# Patient Record
Sex: FEMALE | Race: WHITE | NOT HISPANIC OR LATINO | Employment: FULL TIME | URBAN - METROPOLITAN AREA
[De-identification: names, ages, dates, MRNs, and addresses within clinical notes are randomized per-mention and may not be internally consistent; named-entity substitution may affect disease eponyms.]

---

## 2017-05-23 ENCOUNTER — OFFICE VISIT (OUTPATIENT)
Dept: INTERNAL MEDICINE | Facility: CLINIC | Age: 47
End: 2017-05-23
Payer: COMMERCIAL

## 2017-05-23 VITALS
HEART RATE: 69 BPM | OXYGEN SATURATION: 98 % | SYSTOLIC BLOOD PRESSURE: 132 MMHG | BODY MASS INDEX: 27.74 KG/M2 | DIASTOLIC BLOOD PRESSURE: 82 MMHG | TEMPERATURE: 98 F | WEIGHT: 183 LBS | RESPIRATION RATE: 20 BRPM | HEIGHT: 68 IN

## 2017-05-23 DIAGNOSIS — G51.0 BELL'S PALSY: Primary | ICD-10-CM

## 2017-05-23 PROCEDURE — 99213 OFFICE O/P EST LOW 20 MIN: CPT | Mod: 25,S$GLB,, | Performed by: NURSE PRACTITIONER

## 2017-05-23 PROCEDURE — 96372 THER/PROPH/DIAG INJ SC/IM: CPT | Mod: S$GLB,,, | Performed by: NURSE PRACTITIONER

## 2017-05-23 PROCEDURE — 99999 PR PBB SHADOW E&M-EST. PATIENT-LVL III: CPT | Mod: PBBFAC,,, | Performed by: NURSE PRACTITIONER

## 2017-05-23 RX ORDER — METHYLPREDNISOLONE 4 MG/1
TABLET ORAL
Qty: 1 PACKAGE | Refills: 0 | Status: SHIPPED | OUTPATIENT
Start: 2017-05-23 | End: 2017-06-13

## 2017-05-23 RX ORDER — VALACYCLOVIR HYDROCHLORIDE 1 G/1
1000 TABLET, FILM COATED ORAL 2 TIMES DAILY
Qty: 14 TABLET | Refills: 0 | Status: SHIPPED | OUTPATIENT
Start: 2017-05-23 | End: 2019-07-15

## 2017-05-23 RX ORDER — METHYLPREDNISOLONE ACETATE 80 MG/ML
80 INJECTION, SUSPENSION INTRA-ARTICULAR; INTRALESIONAL; INTRAMUSCULAR; SOFT TISSUE
Status: COMPLETED | OUTPATIENT
Start: 2017-05-23 | End: 2017-05-23

## 2017-05-23 RX ADMIN — METHYLPREDNISOLONE ACETATE 80 MG: 80 INJECTION, SUSPENSION INTRA-ARTICULAR; INTRALESIONAL; INTRAMUSCULAR; SOFT TISSUE at 03:05

## 2017-05-23 NOTE — PROGRESS NOTES
Subjective:       Patient ID: Tracie Scott is a 47 y.o. female.    Chief Complaint: Numbness (in L. side of face x 2 days); Aphasia; Drooling; and Eye Problem (vision problems)    Patient is known, to me and presents with   Chief Complaint   Patient presents with    Numbness     in L. side of face x 2 days    Aphasia    Drooling    Eye Problem     vision problems   .  Denies chest pain and shortness of breath.  Patient presents with possible Bell's Palsy for the past couple of days  HPI  Review of Systems   Constitutional: Negative for activity change, appetite change, fatigue, fever and unexpected weight change.   HENT: Negative for congestion, ear discharge, ear pain, hearing loss, postnasal drip and tinnitus.    Eyes: Positive for photophobia, pain and visual disturbance.   Respiratory: Negative for cough, shortness of breath, wheezing and stridor.    Cardiovascular: Negative for chest pain, palpitations and leg swelling.   Gastrointestinal: Negative for abdominal distention.   Genitourinary: Negative for difficulty urinating, dysuria, frequency, hematuria and urgency.   Musculoskeletal: Negative for arthralgias, back pain, gait problem, joint swelling and neck pain.   Neurological: Positive for numbness. Negative for dizziness, seizures, syncope, weakness, light-headedness and headaches.   Hematological: Negative for adenopathy. Does not bruise/bleed easily.   Psychiatric/Behavioral: Negative for behavioral problems, confusion and hallucinations.       Objective:      Physical Exam   Constitutional: She is oriented to person, place, and time. She appears well-developed and well-nourished. No distress.   HENT:   Head: Normocephalic and atraumatic.   Right Ear: External ear normal.   Left Ear: External ear normal.   Mouth/Throat: Oropharynx is clear and moist. No oropharyngeal exudate.   Eyes: Conjunctivae and EOM are normal. Pupils are equal, round, and reactive to light. Right eye exhibits no discharge. Left  "eye exhibits no discharge.   Neck: Normal range of motion. Neck supple. No JVD present. No thyromegaly present.   Cardiovascular: Normal rate, regular rhythm, normal heart sounds and intact distal pulses.  Exam reveals no gallop and no friction rub.    No murmur heard.  Pulmonary/Chest: Effort normal and breath sounds normal. No stridor. No respiratory distress. She has no wheezes. She has no rales. She exhibits no tenderness.   Abdominal: Soft. Bowel sounds are normal. She exhibits no distension and no mass. There is no tenderness. There is no rebound.   Musculoskeletal: Normal range of motion. She exhibits no edema or tenderness.   Lymphadenopathy:     She has no cervical adenopathy.   Neurological: She is alert and oriented to person, place, and time. She has normal reflexes. She displays normal reflexes. A cranial nerve deficit is present. She exhibits normal muscle tone. Coordination normal.   Unable to close left eyelid and has some left facial drooping. Tingling and numbness to left side of face.    Skin: Skin is warm and dry. No rash noted. No erythema. No pallor.   Psychiatric: She has a normal mood and affect. Her behavior is normal. Judgment and thought content normal.       Assessment:       1. Bell's palsy        Plan:   Tracie was seen today for numbness, aphasia, drooling and eye problem.    Diagnoses and all orders for this visit:    Bell's palsy  -     methylPREDNISolone acetate injection 80 mg; Inject 1 mL (80 mg total) into the muscle one time.  -     methylPREDNISolone (MEDROL DOSEPACK) 4 mg tablet; use as directed  -     valacyclovir (VALTREX) 1000 MG tablet; Take 1 tablet (1,000 mg total) by mouth 2 (two) times daily.    "This note will not be shared with the patient."  Gave instructions on bell's palsy  Also on eye care  Will see patient on Friday  RTC as scheduled  "

## 2017-05-26 ENCOUNTER — OFFICE VISIT (OUTPATIENT)
Dept: INTERNAL MEDICINE | Facility: CLINIC | Age: 47
End: 2017-05-26
Payer: COMMERCIAL

## 2017-05-26 VITALS
DIASTOLIC BLOOD PRESSURE: 68 MMHG | WEIGHT: 180 LBS | HEIGHT: 68 IN | BODY MASS INDEX: 27.28 KG/M2 | RESPIRATION RATE: 20 BRPM | HEART RATE: 75 BPM | OXYGEN SATURATION: 99 % | SYSTOLIC BLOOD PRESSURE: 114 MMHG

## 2017-05-26 DIAGNOSIS — G51.0 BELL'S PALSY: Primary | ICD-10-CM

## 2017-05-26 PROCEDURE — 99214 OFFICE O/P EST MOD 30 MIN: CPT | Mod: S$GLB,,, | Performed by: NURSE PRACTITIONER

## 2017-05-26 PROCEDURE — 99999 PR PBB SHADOW E&M-EST. PATIENT-LVL III: CPT | Mod: PBBFAC,,, | Performed by: NURSE PRACTITIONER

## 2017-05-26 NOTE — PROGRESS NOTES
Subjective:       Patient ID: Tracie Scott is a 47 y.o. female.    Chief Complaint: Follow-up (x 3 days)    Patient is known, to me and presents with   Chief Complaint   Patient presents with    Follow-up     x 3 days   .  Denies chest pain and shortness of breath.  Follow up bell's palsy and states that she does feel better. Her left eye still has some sensitivity and able to close it better  HPI  Review of Systems   Constitutional: Negative for activity change, appetite change, fatigue, fever and unexpected weight change.   HENT: Negative for congestion, ear discharge, ear pain, hearing loss, postnasal drip and tinnitus.    Eyes: Positive for photophobia and pain. Negative for visual disturbance.   Respiratory: Negative for cough, shortness of breath, wheezing and stridor.    Cardiovascular: Negative for chest pain, palpitations and leg swelling.   Gastrointestinal: Negative for abdominal distention.   Genitourinary: Negative for difficulty urinating, dysuria, frequency, hematuria and urgency.   Musculoskeletal: Negative for arthralgias, back pain, gait problem, joint swelling and neck pain.   Neurological: Positive for facial asymmetry. Negative for dizziness, seizures, syncope, weakness, light-headedness, numbness and headaches.   Hematological: Negative for adenopathy. Does not bruise/bleed easily.   Psychiatric/Behavioral: Negative for behavioral problems, confusion and hallucinations.       Objective:      Physical Exam   Constitutional: She is oriented to person, place, and time. She appears well-developed and well-nourished. No distress.   HENT:   Head: Normocephalic and atraumatic.   Right Ear: External ear normal.   Left Ear: External ear normal.   Mouth/Throat: Oropharynx is clear and moist. No oropharyngeal exudate.   Eyes: Conjunctivae and EOM are normal. Pupils are equal, round, and reactive to light. Right eye exhibits no discharge. Left eye exhibits no discharge.   Neck: Normal range of motion.  "Neck supple. No JVD present. No thyromegaly present.   Cardiovascular: Normal rate, regular rhythm, normal heart sounds and intact distal pulses.  Exam reveals no gallop and no friction rub.    No murmur heard.  Pulmonary/Chest: Effort normal and breath sounds normal. No stridor. No respiratory distress. She has no wheezes. She has no rales. She exhibits no tenderness.   Abdominal: Soft. Bowel sounds are normal. She exhibits no distension and no mass. There is no tenderness. There is no rebound.   Musculoskeletal: Normal range of motion. She exhibits no edema or tenderness.   Lymphadenopathy:     She has no cervical adenopathy.   Neurological: She is alert and oriented to person, place, and time. She has normal reflexes. She displays normal reflexes. A cranial nerve deficit is present. She exhibits normal muscle tone. Coordination normal.   Improved left sided drooping and able to close left eye lid   Skin: Skin is warm and dry. No rash noted. No erythema. No pallor.   Psychiatric: She has a normal mood and affect. Her behavior is normal. Judgment and thought content normal.       Assessment:       1. Bell's palsy        Plan:   Tracie was seen today for follow-up.    Diagnoses and all orders for this visit:    Bell's palsy    "This note will not be shared with the patient."  Improving and feeling better  Continue with medication  rtc in two weeks   "

## 2017-09-12 ENCOUNTER — LAB VISIT (OUTPATIENT)
Dept: LAB | Facility: HOSPITAL | Age: 47
End: 2017-09-12
Attending: OBSTETRICS & GYNECOLOGY
Payer: COMMERCIAL

## 2017-09-12 ENCOUNTER — OFFICE VISIT (OUTPATIENT)
Dept: OBSTETRICS AND GYNECOLOGY | Facility: CLINIC | Age: 47
End: 2017-09-12
Payer: COMMERCIAL

## 2017-09-12 VITALS
RESPIRATION RATE: 13 BRPM | BODY MASS INDEX: 29.66 KG/M2 | HEIGHT: 67 IN | DIASTOLIC BLOOD PRESSURE: 76 MMHG | HEART RATE: 72 BPM | SYSTOLIC BLOOD PRESSURE: 122 MMHG | WEIGHT: 189 LBS

## 2017-09-12 DIAGNOSIS — N30.01 ACUTE CYSTITIS WITH HEMATURIA: ICD-10-CM

## 2017-09-12 DIAGNOSIS — R30.0 DYSURIA: Primary | ICD-10-CM

## 2017-09-12 DIAGNOSIS — N92.6 IRREGULAR MENSTRUAL CYCLE: ICD-10-CM

## 2017-09-12 LAB
BILIRUB SERPL-MCNC: NEGATIVE MG/DL
BLOOD URINE, POC: NEGATIVE
COLOR, POC UA: CLEAR
FSH SERPL-ACNC: 22 MIU/ML
GLUCOSE UR QL STRIP: NEGATIVE
KETONES UR QL STRIP: NEGATIVE
LEUKOCYTE ESTERASE URINE, POC: 2
NITRITE, POC UA: NEGATIVE
PH, POC UA: 5
PROTEIN, POC: NEGATIVE
SPECIFIC GRAVITY, POC UA: 1.01
UROBILINOGEN, POC UA: NEGATIVE

## 2017-09-12 PROCEDURE — 83001 ASSAY OF GONADOTROPIN (FSH): CPT

## 2017-09-12 PROCEDURE — 81001 URINALYSIS AUTO W/SCOPE: CPT | Mod: S$GLB,,, | Performed by: OBSTETRICS & GYNECOLOGY

## 2017-09-12 PROCEDURE — 3008F BODY MASS INDEX DOCD: CPT | Mod: S$GLB,,, | Performed by: OBSTETRICS & GYNECOLOGY

## 2017-09-12 PROCEDURE — 36415 COLL VENOUS BLD VENIPUNCTURE: CPT

## 2017-09-12 PROCEDURE — 99213 OFFICE O/P EST LOW 20 MIN: CPT | Mod: 25,S$GLB,, | Performed by: OBSTETRICS & GYNECOLOGY

## 2017-09-12 PROCEDURE — 99999 PR PBB SHADOW E&M-EST. PATIENT-LVL III: CPT | Mod: PBBFAC,,, | Performed by: OBSTETRICS & GYNECOLOGY

## 2017-09-12 RX ORDER — MEDROXYPROGESTERONE ACETATE 10 MG/1
10 TABLET ORAL DAILY
Qty: 10 TABLET | Refills: 3 | Status: SHIPPED | OUTPATIENT
Start: 2017-09-12 | End: 2019-07-15

## 2017-09-12 RX ORDER — NITROFURANTOIN 25; 75 MG/1; MG/1
100 CAPSULE ORAL 2 TIMES DAILY
Qty: 14 CAPSULE | Refills: 0 | Status: SHIPPED | OUTPATIENT
Start: 2017-09-12 | End: 2017-10-09

## 2017-09-12 NOTE — PROGRESS NOTES
Subjective:       Patient ID: Tracie Scott is a 47 y.o. female.    Chief Complaint:  Pelvic Pain (right sided, approx for 1 month, on & off); Urinary Frequency; and Urinary Pressure (no burning)      History of Present Illness  Patient presents complaining of some pelvic pain.  Patient describes it as feeling as if she has to have a menstrual cycle.  Patient is status post radiation and chemotherapy for breast cancer approximately 2 years ago.  Patient states she only had one episode of bleeding and an entire 2 year time frame.  Patient states is been many months since she's had a period but she feels like she needs to.  She also complains of frequency and urgency with suprapubic pressure.  Patient denies any fever or chills.    Menstrual History:  OB History      Para Term  AB Living    3 3       4    SAB TAB Ectopic Multiple Live Births          1           Menarche age:   Patient's last menstrual period was 2017.         Review of Systems  Review of Systems   Constitutional: Positive for chills, fatigue and unexpected weight change. Negative for activity change, appetite change, diaphoresis and fever.   HENT: Positive for dental problem, postnasal drip and sinus pressure. Negative for congestion, drooling, ear discharge, ear pain, facial swelling, hearing loss, mouth sores, nosebleeds, rhinorrhea, sneezing, sore throat, tinnitus, trouble swallowing and voice change.    Eyes: Negative for photophobia, pain, discharge, redness, itching and visual disturbance.   Respiratory: Negative for apnea, cough, choking, chest tightness, shortness of breath, wheezing and stridor.    Cardiovascular: Negative for chest pain, palpitations and leg swelling.   Gastrointestinal: Negative for abdominal distention, abdominal pain, anal bleeding, blood in stool, constipation, diarrhea, nausea, rectal pain and vomiting.   Endocrine: Positive for polydipsia and polyuria. Negative for cold intolerance, heat intolerance  and polyphagia.   Genitourinary: Positive for urgency. Negative for decreased urine volume, difficulty urinating, dyspareunia, dysuria, enuresis, flank pain, frequency, genital sores, hematuria, menstrual problem, pelvic pain, vaginal bleeding, vaginal discharge and vaginal pain.   Musculoskeletal: Positive for arthralgias. Negative for back pain, gait problem, joint swelling, myalgias, neck pain and neck stiffness.   Skin: Negative for color change, pallor, rash and wound.   Allergic/Immunologic: Negative for environmental allergies, food allergies and immunocompromised state.   Neurological: Positive for headaches. Negative for dizziness, tremors, seizures, syncope, facial asymmetry, speech difficulty, weakness, light-headedness and numbness.   Hematological: Negative for adenopathy. Does not bruise/bleed easily.   Psychiatric/Behavioral: Positive for sleep disturbance. Negative for agitation, behavioral problems, confusion, decreased concentration, dysphoric mood, hallucinations, self-injury and suicidal ideas. The patient is nervous/anxious. The patient is not hyperactive.            Objective:    Physical Exam   Genitourinary: Rectal exam shows no external hemorrhoid. No labial fusion. There is no rash, tenderness, lesion or injury on the right labia. There is no rash, tenderness, lesion or injury on the left labia. Uterus is not deviated, not enlarged, not fixed and not tender. Cervix exhibits no motion tenderness, no discharge and no friability. Right adnexum displays no mass, no tenderness and no fullness. Left adnexum displays tenderness. Left adnexum displays no mass and no fullness. No erythema, tenderness or bleeding in the vagina. No foreign body in the vagina. No signs of injury around the vagina. No vaginal discharge found.   Nursing note and vitals reviewed.        Assessment:        1. Dysuria    2. Acute cystitis with hematuria    3. Irregular menstrual cycle               Plan:        Tracie was  seen today for pelvic pain, urinary frequency and urinary pressure.    Diagnoses and all orders for this visit:    Dysuria  -     POCT URINE SEDIMENT EXAM  -     POCT urinalysis, dipstick or tablet reag    Acute cystitis with hematuria    Irregular menstrual cycle

## 2017-10-09 ENCOUNTER — PROCEDURE VISIT (OUTPATIENT)
Dept: OBSTETRICS AND GYNECOLOGY | Facility: CLINIC | Age: 47
End: 2017-10-09
Payer: COMMERCIAL

## 2017-10-09 ENCOUNTER — OFFICE VISIT (OUTPATIENT)
Dept: OBSTETRICS AND GYNECOLOGY | Facility: CLINIC | Age: 47
End: 2017-10-09
Payer: COMMERCIAL

## 2017-10-09 ENCOUNTER — TELEPHONE (OUTPATIENT)
Dept: OBSTETRICS AND GYNECOLOGY | Facility: CLINIC | Age: 47
End: 2017-10-09

## 2017-10-09 VITALS
DIASTOLIC BLOOD PRESSURE: 80 MMHG | BODY MASS INDEX: 29.98 KG/M2 | SYSTOLIC BLOOD PRESSURE: 122 MMHG | HEART RATE: 76 BPM | WEIGHT: 191 LBS | HEIGHT: 67 IN | RESPIRATION RATE: 13 BRPM

## 2017-10-09 DIAGNOSIS — R30.0 DYSURIA: ICD-10-CM

## 2017-10-09 DIAGNOSIS — R10.2 PELVIC PAIN IN FEMALE: Primary | ICD-10-CM

## 2017-10-09 DIAGNOSIS — R10.2 PELVIC PAIN IN FEMALE: ICD-10-CM

## 2017-10-09 PROCEDURE — 99999 PR PBB SHADOW E&M-EST. PATIENT-LVL III: CPT | Mod: PBBFAC,,, | Performed by: OBSTETRICS & GYNECOLOGY

## 2017-10-09 PROCEDURE — 99214 OFFICE O/P EST MOD 30 MIN: CPT | Mod: S$GLB,,, | Performed by: OBSTETRICS & GYNECOLOGY

## 2017-10-09 PROCEDURE — 87086 URINE CULTURE/COLONY COUNT: CPT

## 2017-10-09 PROCEDURE — 76830 TRANSVAGINAL US NON-OB: CPT | Mod: S$GLB,,, | Performed by: OBSTETRICS & GYNECOLOGY

## 2017-10-09 NOTE — TELEPHONE ENCOUNTER
----- Message from Danielle Donovan MA sent at 10/9/2017  8:43 AM CDT -----  Contact: self  Tracie Scott  MRN: [id number]  Home Phone      [phone]  Work Phone      Not on file.  Mobile          [phone]    Patient Care Team:  Primary Doctor No as PCP - General  Ilan Becker MD as Obstetrician (Obstetrics)  OB? No  What phone number can you be reached at?   [phone]  Message:   Needs to discuss abd pain.  Stated still having pain since last office visit and not sure why or if this or normal.

## 2017-10-09 NOTE — PROGRESS NOTES
Subjective:    Patient ID: Tracie Scott is a 47 y.o. y.o. female.     Chief Complaint:   Chief Complaint   Patient presents with    Pelvic Pain     radiates    PELVIC PRESSURE       History of Present Illness:  Tracie presents today for evaluation of pelvic pain which has been going on for the past 2 months. She states she has pain in her lower abdomen which radiates across her abdomen into her pelvis. She has urinary frequency and occasional dysuria. She denies hematuria, fever, chills, N/V, diarrhea, but admits to constipation.  She has been on antibiotic for a bladder infection but states she has not improved.    Menstrual History:   Patient's last menstrual period was 2017 (exact date)..     OB History      Para Term  AB Living    3 3       4    SAB TAB Ectopic Multiple Live Births          1              Review of Systems   Constitutional: Negative for activity change, appetite change, chills, diaphoresis, fatigue, fever and unexpected weight change.   HENT: Negative for mouth sores and tinnitus.    Eyes: Negative for discharge and visual disturbance.   Respiratory: Negative for cough, shortness of breath and wheezing.    Cardiovascular: Negative for chest pain, palpitations and leg swelling.   Gastrointestinal: Positive for abdominal pain and constipation. Negative for blood in stool, diarrhea, nausea and vomiting.   Endocrine: Negative for diabetes, hair loss, hot flashes, hyperthyroidism and hypothyroidism.   Genitourinary: Positive for dysuria, frequency and pelvic pain. Negative for decreased libido, dyspareunia, flank pain, genital sores, hematuria, menorrhagia, menstrual problem, urgency, vaginal bleeding, vaginal discharge, vaginal pain, urinary incontinence, postcoital bleeding and vaginal odor.   Musculoskeletal: Negative for back pain, joint swelling and myalgias.   Skin:  Negative for rash, no acne and hair changes.   Neurological: Negative for seizures, syncope, numbness and  headaches.   Hematological: Negative for adenopathy. Does not bruise/bleed easily.   Psychiatric/Behavioral: Negative for sleep disturbance. The patient is not nervous/anxious.    Breast: Negative for breast pain and nipple discharge        Objective:    Vital Signs:  Vitals:    10/09/17 1345   BP: 122/80   Pulse: 76   Resp: 13       Physical Exam:  General:  alert,normal appearing gravid female   Skin:  Skin color, texture, turgor normal. No rashes or lesions   HEENT:  conjunctivae/corneas clear. PERRL.   Neck: supple, trachea midline, no adenopathy or thyromegally   Respiratory:  clear to auscultation bilaterally   Heart:  regular rate and rhythm, S1, S2 normal, no murmur, click, rub or gallop   Breasts:   Nipples are protruding and have no nipple discharge. No palpable masses, erythema, skin changes, tenderness, or adenopathy.   Abdomen:  soft, miold tenderness all quadrants. No guarding, rebound. Bowel sounds normal. No masses,  no organomegaly   Pelvis: External genitalia: normal general appearance  Urinary system: urethral meatus normal, bladder tender to palpation  Vaginal: normal mucosa without prolapse or lesions  Cervix: normal appearance. No cervical motion tenderness.  Uterus: anteverted, tender  Adnexa: tenderness, no palpable masses.   Extremities: Normal ROM; no edema, no cyanosis   Neurologial: Normal strength and tone. No focal numbness or weakness. Reflexes 2+ and equal.   Psychiatric: normal mood, speech, dress, and thought processes         Assessment:      1. Pelvic pain in female    2. Dysuria          Plan:      Pelvic pain in female  -     US OB/GYN Procedure ([facility]); Future; Expected date: 10/09/2017    Dysuria  -     Urine culture; Future; Expected date: 10/09/2017        NOTE:  Following U/S it was noted that the endometrium was thickened and irregular in appearance. I would recommend biopsy as she is currently on Tamoxifen. She will have this following her urology consult.

## 2017-10-10 ENCOUNTER — TELEPHONE (OUTPATIENT)
Dept: OBSTETRICS AND GYNECOLOGY | Facility: CLINIC | Age: 47
End: 2017-10-10

## 2017-10-10 NOTE — TELEPHONE ENCOUNTER
Patient notified of appointment with [facility] Angu[facility] 10/11/17 at 1:40PM. Patient verbalized understanding with no questions or concerns.

## 2017-10-11 LAB — BACTERIA UR CULT: NO GROWTH

## 2017-10-16 ENCOUNTER — OFFICE VISIT (OUTPATIENT)
Dept: OBSTETRICS AND GYNECOLOGY | Facility: CLINIC | Age: 47
End: 2017-10-16
Payer: COMMERCIAL

## 2017-10-16 VITALS
WEIGHT: 190.63 LBS | DIASTOLIC BLOOD PRESSURE: 66 MMHG | SYSTOLIC BLOOD PRESSURE: 112 MMHG | HEIGHT: 67 IN | RESPIRATION RATE: 16 BRPM | HEART RATE: 68 BPM | BODY MASS INDEX: 29.92 KG/M2

## 2017-10-16 DIAGNOSIS — N85.00 ENDOMETRIAL HYPERPLASIA: Primary | ICD-10-CM

## 2017-10-16 PROCEDURE — 99214 OFFICE O/P EST MOD 30 MIN: CPT | Mod: 25,S$GLB,, | Performed by: OBSTETRICS & GYNECOLOGY

## 2017-10-16 PROCEDURE — 88305 TISSUE EXAM BY PATHOLOGIST: CPT | Performed by: PATHOLOGY

## 2017-10-16 PROCEDURE — 99999 PR PBB SHADOW E&M-EST. PATIENT-LVL III: CPT | Mod: PBBFAC,,, | Performed by: OBSTETRICS & GYNECOLOGY

## 2017-10-16 PROCEDURE — 58100 BIOPSY OF UTERUS LINING: CPT | Mod: S$GLB,,, | Performed by: OBSTETRICS & GYNECOLOGY

## 2017-10-16 NOTE — PROGRESS NOTES
Subjective:    Patient ID: Tracie Scott is a 47 y.o. y.o. female    Chief Complaint:   Chief Complaint   Patient presents with    Pelvic Pain       History of Present Illness:  Tracie presents today for follow-up of pelvic pain. She states she is feeling much better folowing treatment. She had an ultrasound last week which revealed a thickened endometrium with cystic abnormality. She has been on Tamoxifen for the past 18 months. She denies vaginal bleeding. Since having chemotherapy for breast cancer.      Review of Systems   Constitutional: Negative for activity change, appetite change, chills, diaphoresis, fatigue, fever and unexpected weight change.   Respiratory: Negative for cough, shortness of breath and wheezing.    Cardiovascular: Negative for chest pain, palpitations and leg swelling.   Gastrointestinal: Negative for abdominal pain, blood in stool, constipation, diarrhea, nausea and vomiting.   Endocrine: Negative for diabetes, hair loss, hot flashes, hyperthyroidism and hypothyroidism.   Genitourinary: Negative for decreased libido, dyspareunia, dysuria, flank pain, frequency, genital sores, hematuria, menorrhagia, menstrual problem, pelvic pain, urgency, vaginal bleeding, vaginal discharge, vaginal pain, urinary incontinence, postcoital bleeding and vaginal odor.   Hematological: Negative for adenopathy. Does not bruise/bleed easily.   Psychiatric/Behavioral: Negative for sleep disturbance. The patient is not nervous/anxious.    Breast: Negative for breast pain and nipple discharge          Objective:    Vital Signs:  Vitals:    10/16/17 1404   BP: 112/66   Pulse: 68   Resp: 16       Physical Exam:  General:  alert,normal appearing gravid female   Skin:  Skin color, texture, turgor normal. No rashes or lesions   Abdomen: soft, non-tender. Bowel sounds normal. No masses,  no organomegaly   Pelvis: External genitalia: normal general appearance  Urinary system: urethral meatus normal, bladder  nontender  Vaginal: normal mucosa without prolapse or lesions  Cervix: normal appearance  Uterus: normal single, nontender  Adnexa: normal bimanual exam         Assessment:      1. Endometrial hyperplasia          Plan:      Endometrial hyperplasia      Endometrial Biopsy:      The risks of uterine hyperplasia, neoplasia, and cancer were explained to the patient, as well as the likelihood of abnormal or anovulatory bleeding. She does not have a medical condition, history of anticoagulation, or other evident reason for bleeding at this time.  The rationale for evaluation with endometrial biopsy and pelvic ultrasound was discussed, and she verbalized understanding. She was informed of the risk of bleeding, infection, uterine perforation and pain and that the test will rule out endometrial cancer with accuracy greater than 95%.  She was counseled on the alternatives to endometrial biopsy and agrees to proceed.    TIMEOUT PERFORMED.  The cervix was visualized with a speculum.  The cervix was prepped with Hibiclens.    The cervix was not stenotic. A single-tooth tenaculum was used to stabilize the cervix. Cervical dilation was not required. A Pipelle was used to obtain the endometrial biopsy. The uterus was sounded to 8 cm. Endocervical curettage was not performed. Multiple passes were taken with the Pipelle with retrieval of Adequate tissue sample.    The specimen was placed in formalin and sent to pathology for histology evaluation.      She tolerated the procedure well

## 2017-10-16 NOTE — PROCEDURES
Endometrial biopsy  Date/Time: 10/16/2017 2:37 PM  Performed by: EDMUNDO GLASGOW.  Authorized by: EDMUNDO GLASGOW.         Endometrial Biopsy:       The risks of uterine hyperplasia, neoplasia, and cancer were explained to the patient, as well as the likelihood of abnormal or anovulatory bleeding. She does not have a medical condition, history of anticoagulation, or other evident reason for bleeding at this time.  The rationale for evaluation with endometrial biopsy and pelvic ultrasound was discussed, and she verbalized understanding. She was informed of the risk of bleeding, infection, uterine perforation and pain and that the test will rule out endometrial cancer with accuracy greater than 95%.  She was counseled on the alternatives to endometrial biopsy and agrees to proceed.     TIMEOUT PERFORMED.  The cervix was visualized with a speculum.  The cervix was prepped with Hibiclens.     The cervix was not stenotic. A single-tooth tenaculum was used to stabilize the cervix. Cervical dilation was not required. A Pipelle was used to obtain the endometrial biopsy. The uterus was sounded to 8 cm. Endocervical curettage was not performed. Multiple passes were taken with the Pipelle with retrieval of Adequate tissue sample.     The specimen was placed in formalin and sent to pathology for histology evaluation.       She tolerated the procedure well

## 2017-10-27 ENCOUNTER — TELEPHONE (OUTPATIENT)
Dept: OBSTETRICS AND GYNECOLOGY | Facility: CLINIC | Age: 47
End: 2017-10-27

## 2017-10-27 NOTE — TELEPHONE ENCOUNTER
----- Message from Cindi Davis sent at 10/27/2017 12:58 PM CDT -----  Contact: self  Tracie Scott  MRN: [id number]  Home Phone      [phone]  Work Phone      Not on file.  Mobile          [phone]    Patient Care Team:  Primary Doctor No as PCP - General  Ilan Becker MD as Obstetrician (Obstetrics)  OB? No  What phone number can you be reached at? [phone]  Message: calling for results

## 2017-10-30 NOTE — TELEPHONE ENCOUNTER
I spoke with Tracie regarding her EMB and Right OVarian Cyst. I would like to repeat her U/S in 4-6 weeks. If cyst still present we can discuss removal. In view of her being on   Tamoxifen and the thickened endometrium suggesting a polyp, I would recommend D&C. Will decide after next U/S.

## 2018-01-16 ENCOUNTER — TELEPHONE (OUTPATIENT)
Dept: OBSTETRICS AND GYNECOLOGY | Facility: CLINIC | Age: 48
End: 2018-01-16

## 2018-01-16 NOTE — TELEPHONE ENCOUNTER
----- Message from Sarita Valadez sent at 1/16/2018 12:07 PM CST -----  Contact: Self  Tracie Scott  MRN: [id number]  Home Phone      [phone]  Work Phone      Not on file.  Mobile          [phone]    Patient Care Team:  Primary Doctor No as PCP - General  Ilan Becker MD as Obstetrician (Obstetrics)  OB? No  What phone number can you be reached at? [phone]  Message:   Patient states her cyst is still bothering her and she was instructed to call the clinic with any problems. Please return call.

## 2018-01-16 NOTE — TELEPHONE ENCOUNTER
Patient states GI physician called her this morning to notify that she has cyst on her kidneys. Patient requesting evaluation. Patient scheduled with Dr. Peraza tomorrow at 8:30AM. Patient verbalized understanding with no questions or concerns.

## 2019-03-18 ENCOUNTER — APPOINTMENT (OUTPATIENT)
Dept: LAB | Facility: HOSPITAL | Age: 49
End: 2019-03-18
Attending: OTOLARYNGOLOGY
Payer: COMMERCIAL

## 2019-03-18 DIAGNOSIS — J11.1 FLU: Primary | ICD-10-CM

## 2019-03-18 LAB
INFLUENZA A, MOLECULAR: POSITIVE
INFLUENZA B, MOLECULAR: NEGATIVE
SPECIMEN SOURCE: ABNORMAL

## 2019-03-18 PROCEDURE — 87502 INFLUENZA DNA AMP PROBE: CPT

## 2019-03-29 ENCOUNTER — OFFICE VISIT (OUTPATIENT)
Dept: INTERNAL MEDICINE | Facility: CLINIC | Age: 49
End: 2019-03-29
Payer: COMMERCIAL

## 2019-03-29 VITALS
SYSTOLIC BLOOD PRESSURE: 118 MMHG | RESPIRATION RATE: 10 BRPM | WEIGHT: 183 LBS | HEIGHT: 67 IN | BODY MASS INDEX: 28.72 KG/M2 | DIASTOLIC BLOOD PRESSURE: 60 MMHG | HEART RATE: 78 BPM

## 2019-03-29 DIAGNOSIS — J06.9 VIRAL UPPER RESPIRATORY INFECTION: Primary | ICD-10-CM

## 2019-03-29 LAB
CTP QC/QA: YES
POC MOLECULAR INFLUENZA A AGN: NEGATIVE
POC MOLECULAR INFLUENZA B AGN: NEGATIVE

## 2019-03-29 PROCEDURE — 3008F PR BODY MASS INDEX (BMI) DOCUMENTED: ICD-10-PCS | Mod: CPTII,S$GLB,, | Performed by: NURSE PRACTITIONER

## 2019-03-29 PROCEDURE — 99999 PR PBB SHADOW E&M-EST. PATIENT-LVL III: CPT | Mod: PBBFAC,,, | Performed by: NURSE PRACTITIONER

## 2019-03-29 PROCEDURE — 87502 POCT INFLUENZA A/B MOLECULAR: ICD-10-PCS | Mod: QW,S$GLB,, | Performed by: NURSE PRACTITIONER

## 2019-03-29 PROCEDURE — 99999 PR PBB SHADOW E&M-EST. PATIENT-LVL III: ICD-10-PCS | Mod: PBBFAC,,, | Performed by: NURSE PRACTITIONER

## 2019-03-29 PROCEDURE — 3008F BODY MASS INDEX DOCD: CPT | Mod: CPTII,S$GLB,, | Performed by: NURSE PRACTITIONER

## 2019-03-29 PROCEDURE — 99213 PR OFFICE/OUTPT VISIT, EST, LEVL III, 20-29 MIN: ICD-10-PCS | Mod: 25,S$GLB,, | Performed by: NURSE PRACTITIONER

## 2019-03-29 PROCEDURE — 87502 INFLUENZA DNA AMP PROBE: CPT | Mod: QW,S$GLB,, | Performed by: NURSE PRACTITIONER

## 2019-03-29 PROCEDURE — 96372 PR INJECTION,THERAP/PROPH/DIAG2ST, IM OR SUBCUT: ICD-10-PCS | Mod: S$GLB,,, | Performed by: NURSE PRACTITIONER

## 2019-03-29 PROCEDURE — 99213 OFFICE O/P EST LOW 20 MIN: CPT | Mod: 25,S$GLB,, | Performed by: NURSE PRACTITIONER

## 2019-03-29 PROCEDURE — 96372 THER/PROPH/DIAG INJ SC/IM: CPT | Mod: S$GLB,,, | Performed by: NURSE PRACTITIONER

## 2019-03-29 RX ORDER — CLINDAMYCIN HYDROCHLORIDE 300 MG/1
CAPSULE ORAL
COMMUNITY
Start: 2019-03-18 | End: 2019-07-15

## 2019-03-29 RX ORDER — METHYLPREDNISOLONE ACETATE 80 MG/ML
80 INJECTION, SUSPENSION INTRA-ARTICULAR; INTRALESIONAL; INTRAMUSCULAR; SOFT TISSUE ONCE
Status: COMPLETED | OUTPATIENT
Start: 2019-03-29 | End: 2019-03-29

## 2019-03-29 RX ADMIN — METHYLPREDNISOLONE ACETATE 80 MG: 80 INJECTION, SUSPENSION INTRA-ARTICULAR; INTRALESIONAL; INTRAMUSCULAR; SOFT TISSUE at 11:03

## 2019-03-29 NOTE — PROGRESS NOTES
Subjective:       Patient ID: Tracie Scott is a 48 y.o. female.    Chief Complaint: Cough (pt states that she was dx with flu last week ); Otalgia; and Nasal Congestion    Patient is known, to me and presents with   Chief Complaint   Patient presents with    Cough     pt states that she was dx with flu last week     Otalgia    Nasal Congestion   .  Denies chest pain and shortness of breath.  Patient presents with cough and congestion. She states that she saw ENT last week dx flu and was given tamiflu and clindamycin. She reports that she has above s/s now  HPI  Review of Systems   Constitutional: Negative.    HENT: Positive for congestion, postnasal drip and sore throat.    Eyes: Negative.    Respiratory: Positive for cough.    Cardiovascular: Negative.    Skin: Negative.    Hematological: Negative.        Objective:      Physical Exam   Constitutional: She appears well-developed and well-nourished. No distress.   HENT:   Head: Normocephalic and atraumatic.   Right Ear: Tympanic membrane mobility is abnormal.   Left Ear: Tympanic membrane mobility is abnormal.   Nose: Mucosal edema present.   Mouth/Throat: No oropharyngeal exudate or posterior oropharyngeal erythema.   Eyes: Conjunctivae are normal. Right eye exhibits no discharge. Left eye exhibits no discharge.   Neck: Normal range of motion. Neck supple. No JVD present. No tracheal deviation present. No thyromegaly present.   Cardiovascular: Normal rate, regular rhythm and normal heart sounds.   No murmur heard.  Pulmonary/Chest: Effort normal and breath sounds normal. She has no wheezes.   Lymphadenopathy:     She has no cervical adenopathy.   Skin: Skin is warm and dry. Capillary refill takes less than 2 seconds. No rash noted. She is not diaphoretic. No erythema. No pallor.       Assessment:       1. Viral upper respiratory infection        Plan:   Tracie was seen today for cough, otalgia and nasal congestion.    Diagnoses and all orders for this  "visit:    Viral upper respiratory infection  -     methylPREDNISolone acetate injection 80 mg  -     POCT Influenza A/B Molecular    "This note will not be shared with the patient."  Flu negative  Use flonase and claritin  rtc as scheduled  "

## 2019-05-01 ENCOUNTER — TELEPHONE (OUTPATIENT)
Dept: INTERNAL MEDICINE | Facility: CLINIC | Age: 49
End: 2019-05-01

## 2019-05-01 DIAGNOSIS — Z12.11 COLON CANCER SCREENING: Primary | ICD-10-CM

## 2019-05-01 NOTE — TELEPHONE ENCOUNTER
----- Message from Maggie Ugarte MA sent at 5/1/2019  2:05 PM CDT -----  Contact: Patient  Patient is requesting a kit to do stool test through the mail for colon cancer.     Please Advise--[phone]

## 2019-05-17 ENCOUNTER — TELEPHONE (OUTPATIENT)
Dept: INTERNAL MEDICINE | Facility: CLINIC | Age: 49
End: 2019-05-17

## 2019-07-09 ENCOUNTER — LAB VISIT (OUTPATIENT)
Dept: LAB | Facility: HOSPITAL | Age: 49
End: 2019-07-09
Attending: NURSE PRACTITIONER
Payer: COMMERCIAL

## 2019-07-09 DIAGNOSIS — Z12.11 COLON CANCER SCREENING: ICD-10-CM

## 2019-07-15 ENCOUNTER — OFFICE VISIT (OUTPATIENT)
Dept: NEUROLOGY | Facility: CLINIC | Age: 49
End: 2019-07-15
Payer: COMMERCIAL

## 2019-07-15 VITALS
SYSTOLIC BLOOD PRESSURE: 126 MMHG | BODY MASS INDEX: 30.1 KG/M2 | HEART RATE: 76 BPM | DIASTOLIC BLOOD PRESSURE: 64 MMHG | HEIGHT: 67 IN | RESPIRATION RATE: 16 BRPM | WEIGHT: 191.81 LBS

## 2019-07-15 DIAGNOSIS — R20.0 RIGHT ARM NUMBNESS: Primary | ICD-10-CM

## 2019-07-15 LAB — HEMOCCULT STL QL IA: NEGATIVE

## 2019-07-15 PROCEDURE — 99214 OFFICE O/P EST MOD 30 MIN: CPT | Mod: S$GLB,,, | Performed by: PSYCHIATRY & NEUROLOGY

## 2019-07-15 PROCEDURE — 99214 PR OFFICE/OUTPT VISIT, EST, LEVL IV, 30-39 MIN: ICD-10-PCS | Mod: S$GLB,,, | Performed by: PSYCHIATRY & NEUROLOGY

## 2019-07-15 PROCEDURE — 99999 PR PBB SHADOW E&M-EST. PATIENT-LVL III: CPT | Mod: PBBFAC,,, | Performed by: PSYCHIATRY & NEUROLOGY

## 2019-07-15 PROCEDURE — 3008F BODY MASS INDEX DOCD: CPT | Mod: CPTII,S$GLB,, | Performed by: PSYCHIATRY & NEUROLOGY

## 2019-07-15 PROCEDURE — 3008F PR BODY MASS INDEX (BMI) DOCUMENTED: ICD-10-PCS | Mod: CPTII,S$GLB,, | Performed by: PSYCHIATRY & NEUROLOGY

## 2019-07-15 PROCEDURE — 82274 ASSAY TEST FOR BLOOD FECAL: CPT

## 2019-07-15 PROCEDURE — 99999 PR PBB SHADOW E&M-EST. PATIENT-LVL III: ICD-10-PCS | Mod: PBBFAC,,, | Performed by: PSYCHIATRY & NEUROLOGY

## 2019-07-15 RX ORDER — NORTRIPTYLINE HYDROCHLORIDE 10 MG/1
CAPSULE ORAL
Qty: 60 CAPSULE | Refills: 11 | Status: SHIPPED | OUTPATIENT
Start: 2019-07-15 | End: 2020-09-09

## 2019-07-15 NOTE — PROGRESS NOTES
HPI: Tracie Scott is a 49 y.o. female with history of pain in the  back most of her life, worsening  and radiating across the left hip and never down the left leg    The patient has not seen me since 2016    She is known to me for left lumbar radicular pain. I had recommended MRI L spine at that visit, but she never did comply with that or follow up  She states that is because her lumbar and leg pain resolved.    She is here due to right arm pain and numbness and tingling from the right shoulder down to her fingertips. Her pain is on the right arm (upper and wrist region). This has been occurring for years (since lymph node removal) but is worse over the past few months.  The pain is described as squeezing. She uses Aleeve sometimes but this is not working  She has used gabapentin which after these symptoms started early on, but she could not tolerated due to sedation    She saw General Surgery, Dr Gar who performed an US of the axilla and Mammogram and she states all was well.     She works as a     Review of Systems   Constitutional: Negative for fever.   HENT: Negative for nosebleeds.    Eyes: Negative for double vision.   Respiratory: Negative for hemoptysis.    Cardiovascular: Negative for leg swelling.   Gastrointestinal: Negative for blood in stool.   Genitourinary: Negative for hematuria.   Musculoskeletal: Negative for back pain and neck pain.   Skin: Negative for rash.   Neurological: Positive for sensory change.   Psychiatric/Behavioral: The patient has insomnia.          I have reviewed all of this patient's past medical and surgical histories as well as family and social histories and active allergies and medications as documented in the electronic medical record.        Exam:  Gen Appearance, well developed/nourished in no apparent distress  CV: 2+ distal pulses with no edema or swelling  Neuro:  MS: Awake, alert,Sustains attention. Recent/remote memory intact, Language is full  to spontaneous speech/naming/comprehension. Fund of Knowledge is full  CN: Optic discs are flat with normal vasculature, PERRL, Extraoccular movements and visual fields are full. Normal facial sensation and strength, Hearing symmetric, Tongue and Palate are midline and strong. Shoulder Shrug symmetric and strong.  Motor: Normal bulk, tone, no abnormal movements. 5/5 strength bilateral upper/lower extremities with 2+ reflexes and no clonus  Sensory: symmetric to light touch, pain, temp, and vibration but some decrease to pin on the right forearm, Romberg negative  Cerebellar: Finger-nose,Heal-shin, Rapid alternating movements intact  Gait: Normal stance, no ataxia  -Tinel's sign    EM2016: Mild Right Carpal Tunnel Syndrome.  No Large fiber Brachial Plexopathy could be detected       Assessment/Plan: Tracie Scott is a 49 y.o. female with left lumbar radicular pain in 2016 which is resolved.  Presenting with worsening of her chronic right arm numbness/ neuropathic type pain.   I recommend:   1. MRI Lumbar regarding  her left lumbar radicular pain was never done as recommended  -fortunately this pain is resolved and does not require further work up  4. She had mild Right CTS by 2016 EMG of the arms NO large fiber brachial plexopathy found on 2016 EMG--she has had some arm numbness since axillary lymph node dissection in 2015  And this seems worse over the past few months  -She did see General Surgery who performed mammogram and axially US which were unremarkable and she states surgeon felt her symptoms could be due to scarring   -She states PT made her symptoms worse, Gabapentin was not tolerated  -Repeat EMG/NCS of the arms to look for any changes  -Trial of Pamelor nightly Unless sedation, mood changes or other side effects  RTC for EMG/NCS

## 2019-07-19 ENCOUNTER — TELEPHONE (OUTPATIENT)
Dept: NEUROLOGY | Facility: CLINIC | Age: 49
End: 2019-07-19

## 2019-07-19 NOTE — TELEPHONE ENCOUNTER
----- Message from Keiko Brooks sent at 2019 10:26 AM CDT -----  Contact: PATIENT  Tracie Scott  MRN: [id number]  : 1970  PCP: Primary Doctor No  Home Phone      [phone]  Work Phone      Not on file.  Mobile          [phone]      MESSAGE: Patient was seen last week regarding nerve damage to right arm, she states that she has developed tingling & pain that shoots down to her fingers.  She would like to see if this is something that she should expect.      Phone: [phone]

## 2019-07-23 ENCOUNTER — HOSPITAL ENCOUNTER (EMERGENCY)
Facility: HOSPITAL | Age: 49
Discharge: HOME | End: 2019-07-23
Attending: SURGERY
Payer: COMMERCIAL

## 2019-07-23 VITALS
DIASTOLIC BLOOD PRESSURE: 76 MMHG | TEMPERATURE: 98 F | OXYGEN SATURATION: 98 % | SYSTOLIC BLOOD PRESSURE: 123 MMHG | HEART RATE: 79 BPM | RESPIRATION RATE: 20 BRPM

## 2019-07-23 DIAGNOSIS — T75.00XA: ICD-10-CM

## 2019-07-23 LAB
ALBUMIN SERPL BCP-MCNC: 4.7 G/DL (ref 3.5–5.2)
ALBUMIN SERPL BCP-MCNC: 4.7 G/DL (ref 3.5–5.2)
ALP SERPL-CCNC: 75 U/L (ref 55–135)
ALP SERPL-CCNC: 75 U/L (ref 55–135)
ALT SERPL W/O P-5'-P-CCNC: 14 U/L (ref 10–44)
ALT SERPL W/O P-5'-P-CCNC: 14 U/L (ref 10–44)
ANION GAP SERPL CALC-SCNC: 15 MMOL/L (ref 8–16)
ANION GAP SERPL CALC-SCNC: 15 MMOL/L (ref 8–16)
APTT BLDCRRT: 26.3 SEC (ref 21–32)
AST SERPL-CCNC: 14 U/L (ref 10–40)
AST SERPL-CCNC: 14 U/L (ref 10–40)
BASOPHILS # BLD AUTO: 0.05 K/UL (ref 0–0.2)
BASOPHILS NFR BLD: 0.6 % (ref 0–1.9)
BILIRUB SERPL-MCNC: 0.7 MG/DL (ref 0.1–1)
BILIRUB SERPL-MCNC: 0.7 MG/DL (ref 0.1–1)
BILIRUB UR QL STRIP: NEGATIVE
BUN SERPL-MCNC: 13 MG/DL (ref 6–20)
BUN SERPL-MCNC: 13 MG/DL (ref 6–20)
CALCIUM SERPL-MCNC: 10 MG/DL (ref 8.7–10.5)
CALCIUM SERPL-MCNC: 10 MG/DL (ref 8.7–10.5)
CHLORIDE SERPL-SCNC: 103 MMOL/L (ref 95–110)
CHLORIDE SERPL-SCNC: 103 MMOL/L (ref 95–110)
CK MB SERPL-MCNC: 0.9 NG/ML (ref 0.1–6.5)
CK MB SERPL-MCNC: 1.2 NG/ML (ref 0.1–6.5)
CK MB SERPL-RTO: 1.6 % (ref 0–5)
CK MB SERPL-RTO: 1.7 % (ref 0–5)
CK SERPL-CCNC: 53 U/L (ref 20–180)
CK SERPL-CCNC: 53 U/L (ref 20–180)
CK SERPL-CCNC: 75 U/L (ref 20–180)
CK SERPL-CCNC: 75 U/L (ref 20–180)
CLARITY UR: CLEAR
CO2 SERPL-SCNC: 24 MMOL/L (ref 23–29)
CO2 SERPL-SCNC: 24 MMOL/L (ref 23–29)
COLOR UR: YELLOW
CREAT SERPL-MCNC: 0.8 MG/DL (ref 0.5–1.4)
CREAT SERPL-MCNC: 0.8 MG/DL (ref 0.5–1.4)
DIFFERENTIAL METHOD: ABNORMAL
EOSINOPHIL # BLD AUTO: 0.4 K/UL (ref 0–0.5)
EOSINOPHIL NFR BLD: 4.8 % (ref 0–8)
ERYTHROCYTE [DISTWIDTH] IN BLOOD BY AUTOMATED COUNT: 12.2 % (ref 11.5–14.5)
EST. GFR  (AFRICAN AMERICAN): >60 ML/MIN/1.73 M^2
EST. GFR  (AFRICAN AMERICAN): >60 ML/MIN/1.73 M^2
EST. GFR  (NON AFRICAN AMERICAN): >60 ML/MIN/1.73 M^2
EST. GFR  (NON AFRICAN AMERICAN): >60 ML/MIN/1.73 M^2
GLUCOSE SERPL-MCNC: 91 MG/DL (ref 70–110)
GLUCOSE SERPL-MCNC: 91 MG/DL (ref 70–110)
GLUCOSE UR QL STRIP: NEGATIVE
HCT VFR BLD AUTO: 38.1 % (ref 37–48.5)
HGB BLD-MCNC: 12.7 G/DL (ref 12–16)
HGB UR QL STRIP: NEGATIVE
IMM GRANULOCYTES # BLD AUTO: 0.02 K/UL (ref 0–0.04)
IMM GRANULOCYTES NFR BLD AUTO: 0.2 % (ref 0–0.5)
KETONES UR QL STRIP: NEGATIVE
LEUKOCYTE ESTERASE UR QL STRIP: ABNORMAL
LYMPHOCYTES # BLD AUTO: 3.1 K/UL (ref 1–4.8)
LYMPHOCYTES NFR BLD: 37.7 % (ref 18–48)
MAGNESIUM SERPL-MCNC: 2.4 MG/DL (ref 1.6–2.6)
MCH RBC QN AUTO: 27.9 PG (ref 27–31)
MCHC RBC AUTO-ENTMCNC: 33.3 G/DL (ref 32–36)
MCV RBC AUTO: 84 FL (ref 82–98)
MICROSCOPIC COMMENT: NORMAL
MONOCYTES # BLD AUTO: 0.6 K/UL (ref 0.3–1)
MONOCYTES NFR BLD: 7 % (ref 4–15)
NEUTROPHILS # BLD AUTO: 4 K/UL (ref 1.8–7.7)
NEUTROPHILS NFR BLD: 49.7 % (ref 38–73)
NITRITE UR QL STRIP: NEGATIVE
NRBC BLD-RTO: 0 /100 WBC
PH UR STRIP: 6 [PH] (ref 5–8)
PHOSPHATE SERPL-MCNC: 1.3 MG/DL (ref 2.7–4.5)
PLATELET # BLD AUTO: 322 K/UL (ref 150–350)
PMV BLD AUTO: 8.8 FL (ref 9.2–12.9)
POTASSIUM SERPL-SCNC: 3.5 MMOL/L (ref 3.5–5.1)
POTASSIUM SERPL-SCNC: 3.5 MMOL/L (ref 3.5–5.1)
PROT SERPL-MCNC: 7.9 G/DL (ref 6–8.4)
PROT SERPL-MCNC: 7.9 G/DL (ref 6–8.4)
PROT UR QL STRIP: NEGATIVE
RBC # BLD AUTO: 4.56 M/UL (ref 4–5.4)
SODIUM SERPL-SCNC: 142 MMOL/L (ref 136–145)
SODIUM SERPL-SCNC: 142 MMOL/L (ref 136–145)
SP GR UR STRIP: 1.02 (ref 1–1.03)
SQUAMOUS #/AREA URNS HPF: 10 /HPF
TROPONIN I SERPL DL<=0.01 NG/ML-MCNC: <0.006 NG/ML (ref 0–0.03)
TROPONIN I SERPL DL<=0.01 NG/ML-MCNC: <0.006 NG/ML (ref 0–0.03)
TSH SERPL DL<=0.005 MIU/L-ACNC: 1.92 UIU/ML (ref 0.4–4)
URN SPEC COLLECT METH UR: ABNORMAL
UROBILINOGEN UR STRIP-ACNC: NEGATIVE EU/DL
WBC # BLD AUTO: 8.1 K/UL (ref 3.9–12.7)
WBC #/AREA URNS HPF: 5 /HPF (ref 0–5)

## 2019-07-23 PROCEDURE — 84100 ASSAY OF PHOSPHORUS: CPT

## 2019-07-23 PROCEDURE — 93005 ELECTROCARDIOGRAM TRACING: CPT

## 2019-07-23 PROCEDURE — 83735 ASSAY OF MAGNESIUM: CPT

## 2019-07-23 PROCEDURE — 93010 EKG 12-LEAD: ICD-10-PCS | Mod: ,,, | Performed by: INTERNAL MEDICINE

## 2019-07-23 PROCEDURE — 25000003 PHARM REV CODE 250: Performed by: SURGERY

## 2019-07-23 PROCEDURE — 63600175 PHARM REV CODE 636 W HCPCS: Performed by: SURGERY

## 2019-07-23 PROCEDURE — 80053 COMPREHEN METABOLIC PANEL: CPT

## 2019-07-23 PROCEDURE — 85025 COMPLETE CBC W/AUTO DIFF WBC: CPT

## 2019-07-23 PROCEDURE — 82550 ASSAY OF CK (CPK): CPT | Mod: 91

## 2019-07-23 PROCEDURE — 93010 ELECTROCARDIOGRAM REPORT: CPT | Mod: ,,, | Performed by: INTERNAL MEDICINE

## 2019-07-23 PROCEDURE — 96361 HYDRATE IV INFUSION ADD-ON: CPT

## 2019-07-23 PROCEDURE — 36415 COLL VENOUS BLD VENIPUNCTURE: CPT

## 2019-07-23 PROCEDURE — 84484 ASSAY OF TROPONIN QUANT: CPT | Mod: 91

## 2019-07-23 PROCEDURE — 96374 THER/PROPH/DIAG INJ IV PUSH: CPT

## 2019-07-23 PROCEDURE — 96376 TX/PRO/DX INJ SAME DRUG ADON: CPT

## 2019-07-23 PROCEDURE — 99285 EMERGENCY DEPT VISIT HI MDM: CPT | Mod: 25

## 2019-07-23 PROCEDURE — 85730 THROMBOPLASTIN TIME PARTIAL: CPT

## 2019-07-23 PROCEDURE — 81000 URINALYSIS NONAUTO W/SCOPE: CPT

## 2019-07-23 PROCEDURE — 82553 CREATINE MB FRACTION: CPT | Mod: 91

## 2019-07-23 PROCEDURE — 84443 ASSAY THYROID STIM HORMONE: CPT

## 2019-07-23 RX ORDER — SODIUM CHLORIDE 9 MG/ML
1000 INJECTION, SOLUTION INTRAVENOUS
Status: COMPLETED | OUTPATIENT
Start: 2019-07-23 | End: 2019-07-23

## 2019-07-23 RX ORDER — CYCLOBENZAPRINE HCL 10 MG
10 TABLET ORAL 3 TIMES DAILY PRN
Qty: 10 TABLET | Refills: 0 | Status: SHIPPED | OUTPATIENT
Start: 2019-07-23 | End: 2019-07-28

## 2019-07-23 RX ORDER — LORAZEPAM 2 MG/ML
1 INJECTION INTRAMUSCULAR
Status: COMPLETED | OUTPATIENT
Start: 2019-07-23 | End: 2019-07-23

## 2019-07-23 RX ADMIN — SODIUM CHLORIDE 1000 ML: 0.9 INJECTION, SOLUTION INTRAVENOUS at 03:07

## 2019-07-23 RX ADMIN — LORAZEPAM 1 MG: 2 INJECTION INTRAMUSCULAR; INTRAVENOUS at 03:07

## 2019-07-23 RX ADMIN — LORAZEPAM 1 MG: 2 INJECTION INTRAMUSCULAR; INTRAVENOUS at 05:07

## 2019-07-23 RX ADMIN — SODIUM CHLORIDE 1000 ML: 0.9 INJECTION, SOLUTION INTRAVENOUS at 05:07

## 2019-07-23 RX ADMIN — SODIUM CHLORIDE 500 ML: 0.9 INJECTION, SOLUTION INTRAVENOUS at 06:07

## 2019-07-23 NOTE — ED NOTES
Care assumed. Pt lying in stretcher, awake and alert. IV fluids infusing; IV dry and intact without complications. Family at bedside. Pt educated on fall prevention. Notified pt that in need of urine sample. Pt states she currently does not need to urinate. Educated to notify staff when needing to use the restroom. Pt verbalized understanding.
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
## 2019-07-24 NOTE — ED NOTES
Patient provided D/C instructions at the bedside.  Patient was provided the opportunity to review D/C summary and diagnosis.  Patient has no further questions or concerns in regards to treatment/care they have received today in the emergency department.  Patient acknowledged discharge teachings and follow-up appointment as directed.  Patient assisted into vehicle; left the emergency department with family in stable condition.

## 2019-07-25 ENCOUNTER — PROCEDURE VISIT (OUTPATIENT)
Dept: NEUROLOGY | Facility: CLINIC | Age: 49
End: 2019-07-25
Payer: COMMERCIAL

## 2019-07-25 DIAGNOSIS — R20.0 RIGHT ARM NUMBNESS: ICD-10-CM

## 2019-07-25 DIAGNOSIS — G56.01 RIGHT CARPAL TUNNEL SYNDROME: Primary | ICD-10-CM

## 2019-07-25 PROCEDURE — 95911 NRV CNDJ TEST 9-10 STUDIES: CPT | Mod: S$GLB,,, | Performed by: PSYCHIATRY & NEUROLOGY

## 2019-07-25 PROCEDURE — 95886 MUSC TEST DONE W/N TEST COMP: CPT | Mod: S$GLB,,, | Performed by: PSYCHIATRY & NEUROLOGY

## 2019-07-25 PROCEDURE — 95886 PR EMG COMPLETE, W/ NERVE CONDUCTION STUDIES, 5+ MUSCLES: ICD-10-PCS | Mod: S$GLB,,, | Performed by: PSYCHIATRY & NEUROLOGY

## 2019-07-25 PROCEDURE — 95911 PR NERVE CONDUCTION STUDY; 9-10 STUDIES: ICD-10-PCS | Mod: S$GLB,,, | Performed by: PSYCHIATRY & NEUROLOGY
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
## 2019-08-21 ENCOUNTER — HOSPITAL ENCOUNTER (EMERGENCY)
Facility: HOSPITAL | Age: 49
Discharge: HOME | End: 2019-08-21
Attending: SURGERY
Payer: COMMERCIAL

## 2019-08-21 VITALS
BODY MASS INDEX: 32.74 KG/M2 | TEMPERATURE: 98 F | RESPIRATION RATE: 18 BRPM | OXYGEN SATURATION: 99 % | HEIGHT: 64 IN | WEIGHT: 191.81 LBS | DIASTOLIC BLOOD PRESSURE: 80 MMHG | SYSTOLIC BLOOD PRESSURE: 159 MMHG | HEART RATE: 73 BPM

## 2019-08-21 DIAGNOSIS — N30.00 ACUTE CYSTITIS WITHOUT HEMATURIA: ICD-10-CM

## 2019-08-21 DIAGNOSIS — R19.7 DIARRHEA, UNSPECIFIED TYPE: Primary | ICD-10-CM

## 2019-08-21 DIAGNOSIS — R53.83 FATIGUE: ICD-10-CM

## 2019-08-21 LAB
ALBUMIN SERPL BCP-MCNC: 4.4 G/DL (ref 3.5–5.2)
ALP SERPL-CCNC: 67 U/L (ref 55–135)
ALT SERPL W/O P-5'-P-CCNC: 12 U/L (ref 10–44)
ANION GAP SERPL CALC-SCNC: 12 MMOL/L (ref 8–16)
AST SERPL-CCNC: 17 U/L (ref 10–40)
B-HCG UR QL: NEGATIVE
BASOPHILS # BLD AUTO: 0.05 K/UL (ref 0–0.2)
BASOPHILS NFR BLD: 0.5 % (ref 0–1.9)
BILIRUB SERPL-MCNC: 0.5 MG/DL (ref 0.1–1)
BILIRUB UR QL STRIP: NEGATIVE
BUN SERPL-MCNC: 14 MG/DL (ref 6–20)
CALCIUM SERPL-MCNC: 9.5 MG/DL (ref 8.7–10.5)
CHLORIDE SERPL-SCNC: 106 MMOL/L (ref 95–110)
CK MB SERPL-MCNC: 0.9 NG/ML (ref 0.1–6.5)
CK MB SERPL-RTO: 1.7 % (ref 0–5)
CK SERPL-CCNC: 54 U/L (ref 20–180)
CK SERPL-CCNC: 54 U/L (ref 20–180)
CLARITY UR: CLEAR
CO2 SERPL-SCNC: 24 MMOL/L (ref 23–29)
COLOR UR: YELLOW
CREAT SERPL-MCNC: 0.8 MG/DL (ref 0.5–1.4)
DIFFERENTIAL METHOD: NORMAL
EOSINOPHIL # BLD AUTO: 0.5 K/UL (ref 0–0.5)
EOSINOPHIL NFR BLD: 4.9 % (ref 0–8)
ERYTHROCYTE [DISTWIDTH] IN BLOOD BY AUTOMATED COUNT: 12.1 % (ref 11.5–14.5)
EST. GFR  (AFRICAN AMERICAN): >60 ML/MIN/1.73 M^2
EST. GFR  (NON AFRICAN AMERICAN): >60 ML/MIN/1.73 M^2
GLUCOSE SERPL-MCNC: 93 MG/DL (ref 70–110)
GLUCOSE UR QL STRIP: NEGATIVE
HCT VFR BLD AUTO: 37.2 % (ref 37–48.5)
HGB BLD-MCNC: 12.1 G/DL (ref 12–16)
HGB UR QL STRIP: ABNORMAL
IMM GRANULOCYTES # BLD AUTO: 0.02 K/UL (ref 0–0.04)
IMM GRANULOCYTES NFR BLD AUTO: 0.2 % (ref 0–0.5)
KETONES UR QL STRIP: NEGATIVE
LEUKOCYTE ESTERASE UR QL STRIP: ABNORMAL
LIPASE SERPL-CCNC: 40 U/L (ref 4–60)
LYMPHOCYTES # BLD AUTO: 2.9 K/UL (ref 1–4.8)
LYMPHOCYTES NFR BLD: 28.5 % (ref 18–48)
MCH RBC QN AUTO: 28.4 PG (ref 27–31)
MCHC RBC AUTO-ENTMCNC: 32.5 G/DL (ref 32–36)
MCV RBC AUTO: 87 FL (ref 82–98)
MICROSCOPIC COMMENT: NORMAL
MONOCYTES # BLD AUTO: 0.5 K/UL (ref 0.3–1)
MONOCYTES NFR BLD: 4.7 % (ref 4–15)
NEUTROPHILS # BLD AUTO: 6.2 K/UL (ref 1.8–7.7)
NEUTROPHILS NFR BLD: 61.2 % (ref 38–73)
NITRITE UR QL STRIP: NEGATIVE
NRBC BLD-RTO: 0 /100 WBC
PH UR STRIP: 6 [PH] (ref 5–8)
PLATELET # BLD AUTO: 343 K/UL (ref 150–350)
PMV BLD AUTO: 9.4 FL (ref 9.2–12.9)
POTASSIUM SERPL-SCNC: 4.3 MMOL/L (ref 3.5–5.1)
PROT SERPL-MCNC: 7.6 G/DL (ref 6–8.4)
PROT UR QL STRIP: NEGATIVE
RBC # BLD AUTO: 4.26 M/UL (ref 4–5.4)
RBC #/AREA URNS HPF: 2 /HPF (ref 0–4)
SODIUM SERPL-SCNC: 142 MMOL/L (ref 136–145)
SP GR UR STRIP: 1.02 (ref 1–1.03)
SQUAMOUS #/AREA URNS HPF: 3 /HPF
TROPONIN I SERPL DL<=0.01 NG/ML-MCNC: <0.006 NG/ML (ref 0–0.03)
URN SPEC COLLECT METH UR: ABNORMAL
UROBILINOGEN UR STRIP-ACNC: NEGATIVE EU/DL
WBC # BLD AUTO: 10.09 K/UL (ref 3.9–12.7)
WBC #/AREA URNS HPF: 5 /HPF (ref 0–5)

## 2019-08-21 PROCEDURE — 82550 ASSAY OF CK (CPK): CPT

## 2019-08-21 PROCEDURE — 85025 COMPLETE CBC W/AUTO DIFF WBC: CPT

## 2019-08-21 PROCEDURE — 25000003 PHARM REV CODE 250: Performed by: SURGERY

## 2019-08-21 PROCEDURE — 93005 ELECTROCARDIOGRAM TRACING: CPT

## 2019-08-21 PROCEDURE — 83690 ASSAY OF LIPASE: CPT

## 2019-08-21 PROCEDURE — 82553 CREATINE MB FRACTION: CPT

## 2019-08-21 PROCEDURE — 80053 COMPREHEN METABOLIC PANEL: CPT

## 2019-08-21 PROCEDURE — 93010 ELECTROCARDIOGRAM REPORT: CPT | Mod: ,,, | Performed by: INTERNAL MEDICINE

## 2019-08-21 PROCEDURE — 84484 ASSAY OF TROPONIN QUANT: CPT

## 2019-08-21 PROCEDURE — 99284 EMERGENCY DEPT VISIT MOD MDM: CPT | Mod: 25

## 2019-08-21 PROCEDURE — 81000 URINALYSIS NONAUTO W/SCOPE: CPT

## 2019-08-21 PROCEDURE — 63600175 PHARM REV CODE 636 W HCPCS: Performed by: SURGERY

## 2019-08-21 PROCEDURE — 81025 URINE PREGNANCY TEST: CPT

## 2019-08-21 PROCEDURE — 96360 HYDRATION IV INFUSION INIT: CPT

## 2019-08-21 PROCEDURE — 93010 EKG 12-LEAD: ICD-10-PCS | Mod: ,,, | Performed by: INTERNAL MEDICINE

## 2019-08-21 RX ORDER — METRONIDAZOLE 500 MG/1
500 TABLET ORAL 4 TIMES DAILY
Qty: 28 TABLET | Refills: 0 | Status: SHIPPED | OUTPATIENT
Start: 2019-08-21 | End: 2019-08-28

## 2019-08-21 RX ORDER — DICYCLOMINE HYDROCHLORIDE 20 MG/1
20 TABLET ORAL 4 TIMES DAILY PRN
Qty: 15 TABLET | Refills: 0 | Status: SHIPPED | OUTPATIENT
Start: 2019-08-21 | End: 2019-09-20

## 2019-08-21 RX ORDER — CIPROFLOXACIN 500 MG/1
500 TABLET ORAL
Status: COMPLETED | OUTPATIENT
Start: 2019-08-21 | End: 2019-08-21

## 2019-08-21 RX ORDER — CIPROFLOXACIN 500 MG/1
500 TABLET ORAL 2 TIMES DAILY
Qty: 14 TABLET | Refills: 0 | Status: SHIPPED | OUTPATIENT
Start: 2019-08-21 | End: 2019-08-28

## 2019-08-21 RX ORDER — SODIUM CHLORIDE 9 MG/ML
1000 INJECTION, SOLUTION INTRAVENOUS
Status: COMPLETED | OUTPATIENT
Start: 2019-08-21 | End: 2019-08-21

## 2019-08-21 RX ORDER — PROMETHAZINE HYDROCHLORIDE 25 MG/1
25 TABLET ORAL EVERY 6 HOURS PRN
Qty: 15 TABLET | Refills: 0 | Status: SHIPPED | OUTPATIENT
Start: 2019-08-21 | End: 2020-09-09

## 2019-08-21 RX ORDER — METRONIDAZOLE 500 MG/1
500 TABLET ORAL
Status: COMPLETED | OUTPATIENT
Start: 2019-08-21 | End: 2019-08-21

## 2019-08-21 RX ORDER — LOPERAMIDE HYDROCHLORIDE 2 MG/1
2 CAPSULE ORAL 4 TIMES DAILY PRN
Qty: 12 CAPSULE | Refills: 0 | Status: SHIPPED | OUTPATIENT
Start: 2019-08-21 | End: 2019-08-31

## 2019-08-21 RX ADMIN — METRONIDAZOLE 500 MG: 500 TABLET, FILM COATED ORAL at 07:08

## 2019-08-21 RX ADMIN — CIPROFLOXACIN 500 MG: 500 TABLET, FILM COATED ORAL at 07:08

## 2019-08-21 RX ADMIN — SODIUM CHLORIDE 1000 ML: 0.9 INJECTION, SOLUTION INTRAVENOUS at 06:08

## 2019-08-21 NOTE — ED PROVIDER NOTES
Encounter Date: 8/21/2019       History     Chief Complaint   Patient presents with    Rectal Bleeding     Patient is 49-year-old white female who has had abdominal cramping and pelvic pressure associated with several watery diarrheal bowel movements with some blood tinging today.  No dysuria.  No nausea and vomiting is reported.  No fever or chills are reported.        Review of patient's allergies indicates:   Allergen Reactions    Iodine Shortness Of Breath    Alcohol Other (See Comments)     Arms go numb with drinking alcohol    Pcn  [penicillins]      Other reaction(s): Unknown     Past Medical History:   Diagnosis Date    Breast cancer 05/2015     Past Surgical History:   Procedure Laterality Date    APPENDECTOMY      BREAST LUMPECTOMY      port removed  05/2019     Family History   Problem Relation Age of Onset    Heart disease Father     Cancer Maternal Grandfather         colon, breast, liver    Breast cancer Maternal Grandmother 82    Colon cancer Maternal Grandmother     Cancer Maternal Grandmother         liver    Ovarian cancer Maternal Grandmother     Cervical cancer Maternal Aunt      Social History     Tobacco Use    Smoking status: Never Smoker    Smokeless tobacco: Never Used   Substance Use Topics    Alcohol use: No    Drug use: No     Review of Systems   Gastrointestinal: Positive for abdominal pain, blood in stool and diarrhea.       Physical Exam     Initial Vitals [08/21/19 1706]   BP Pulse Resp Temp SpO2   (!) 168/100 87 18 97.7 °F (36.5 °C) 99 %      MAP       --         Physical Exam    Nursing note and vitals reviewed.  Constitutional: No distress.   HENT:   Head: Normocephalic and atraumatic.   Nose: Nose normal.   Mouth/Throat: Oropharynx is clear and moist.   Eyes: Conjunctivae and EOM are normal. Pupils are equal, round, and reactive to light.   Neck: Normal range of motion. Neck supple.   Cardiovascular: Normal rate, regular rhythm, normal heart sounds and intact  distal pulses.   Pulmonary/Chest: Breath sounds normal. No respiratory distress. She has no wheezes. She has no rales.   Abdominal: Soft. Bowel sounds are normal. She exhibits no distension. There is no tenderness.   Musculoskeletal: Normal range of motion.   Neurological: She is alert and oriented to person, place, and time. She has normal strength.   Skin: Skin is warm and dry.   Psychiatric: She has a normal mood and affect. Thought content normal.         ED Course   Procedures  Labs Reviewed   URINALYSIS, REFLEX TO URINE CULTURE - Abnormal; Notable for the following components:       Result Value    Occult Blood UA Trace (*)     Leukocytes, UA 1+ (*)     All other components within normal limits    Narrative:     Preferred Collection Type->Urine, Clean Catch   COMPREHENSIVE METABOLIC PANEL   CBC W/ AUTO DIFFERENTIAL   PREGNANCY TEST, URINE RAPID   LIPASE   TROPONIN I   CK   CK-MB   URINALYSIS MICROSCOPIC    Narrative:     Preferred Collection Type->Urine, Clean Catch          Imaging Results          X-Ray Abdomen Flat And Erect (Final result)  Result time 08/21/19 19:51:57    Final result by Jw Estevez MD (08/21/19 19:51:57)                 Impression:      Negative two-view abdominal series.      Electronically signed by: Jw Estevez MD  Date:    08/21/2019  Time:    19:51             Narrative:    EXAMINATION:  XR ABDOMEN FLAT AND ERECT    CLINICAL HISTORY:  Diarrhea (787.91);    COMPARISON:  None.    FINDINGS:  The bowel gas pattern is unremarkable. No obstruction, ileus or free air.    Bony structures are grossly normal.    Several pelvic calcifications consistent with phleboliths.                                                      Clinical Impression:       ICD-10-CM ICD-9-CM   1. Diarrhea, unspecified type R19.7 787.91   2. Fatigue R53.83 780.79   3. Acute cystitis without hematuria N30.00 595.0         Disposition:   Disposition: Discharged  Condition: Stable                         Kobi Nguyen MD  08/21/19 1957

## 2019-08-22 NOTE — ED NOTES
The patient is awake, alert, aware of environment, family member at bedside. Airway is open and patent, respirations are spontaneous, normal respiratory effort and rate noted, full ROM in all extremities, no distress noted. No change from previous assessment. Bed in low, locked position. Pt able to change position independently. Will continue to monitor.

## 2019-08-22 NOTE — ED NOTES
The patient is awake, alert, family member at bedside. Normal respiratory effort and rate noted, full ROM in all extremities, resting comfortably. No change from previous assessment. Bed in low, locked position. Pt able to change position independently. Will continue to monitor.

## 2020-08-17 ENCOUNTER — OFFICE VISIT (OUTPATIENT)
Dept: INTERNAL MEDICINE | Facility: CLINIC | Age: 50
End: 2020-08-17
Payer: COMMERCIAL

## 2020-08-17 VITALS
HEIGHT: 67 IN | DIASTOLIC BLOOD PRESSURE: 86 MMHG | SYSTOLIC BLOOD PRESSURE: 124 MMHG | OXYGEN SATURATION: 97 % | TEMPERATURE: 98 F | HEART RATE: 86 BPM | BODY MASS INDEX: 29.58 KG/M2 | RESPIRATION RATE: 18 BRPM | WEIGHT: 188.5 LBS

## 2020-08-17 DIAGNOSIS — L08.9 STAPH SKIN INFECTION: Primary | ICD-10-CM

## 2020-08-17 DIAGNOSIS — B95.8 STAPH SKIN INFECTION: Primary | ICD-10-CM

## 2020-08-17 DIAGNOSIS — L02.412 ABSCESS OF AXILLA, LEFT: ICD-10-CM

## 2020-08-17 DIAGNOSIS — R23.2 VASOMOTOR FLUSHING: ICD-10-CM

## 2020-08-17 DIAGNOSIS — N92.1 MENORRHAGIA WITH IRREGULAR CYCLE: ICD-10-CM

## 2020-08-17 PROCEDURE — 99214 OFFICE O/P EST MOD 30 MIN: CPT | Mod: 25,S$GLB,, | Performed by: NURSE PRACTITIONER

## 2020-08-17 PROCEDURE — 99999 PR PBB SHADOW E&M-EST. PATIENT-LVL IV: ICD-10-PCS | Mod: PBBFAC,,, | Performed by: NURSE PRACTITIONER

## 2020-08-17 PROCEDURE — 99214 PR OFFICE/OUTPT VISIT, EST, LEVL IV, 30-39 MIN: ICD-10-PCS | Mod: 25,S$GLB,, | Performed by: NURSE PRACTITIONER

## 2020-08-17 PROCEDURE — 3008F PR BODY MASS INDEX (BMI) DOCUMENTED: ICD-10-PCS | Mod: CPTII,S$GLB,, | Performed by: NURSE PRACTITIONER

## 2020-08-17 PROCEDURE — 99999 PR PBB SHADOW E&M-EST. PATIENT-LVL IV: CPT | Mod: PBBFAC,,, | Performed by: NURSE PRACTITIONER

## 2020-08-17 PROCEDURE — 96372 THER/PROPH/DIAG INJ SC/IM: CPT | Mod: S$GLB,,, | Performed by: NURSE PRACTITIONER

## 2020-08-17 PROCEDURE — 3008F BODY MASS INDEX DOCD: CPT | Mod: CPTII,S$GLB,, | Performed by: NURSE PRACTITIONER

## 2020-08-17 PROCEDURE — 96372 PR INJECTION,THERAP/PROPH/DIAG2ST, IM OR SUBCUT: ICD-10-PCS | Mod: S$GLB,,, | Performed by: NURSE PRACTITIONER

## 2020-08-17 RX ORDER — CLINDAMYCIN HYDROCHLORIDE 300 MG/1
300 CAPSULE ORAL EVERY 8 HOURS
Qty: 30 CAPSULE | Refills: 0 | Status: SHIPPED | OUTPATIENT
Start: 2020-08-17 | End: 2020-08-27

## 2020-08-17 RX ORDER — VENLAFAXINE HYDROCHLORIDE 37.5 MG/1
37.5 CAPSULE, EXTENDED RELEASE ORAL DAILY
Qty: 30 CAPSULE | Refills: 11 | Status: SHIPPED | OUTPATIENT
Start: 2020-08-17 | End: 2020-09-09

## 2020-08-17 RX ORDER — METHYLPREDNISOLONE ACETATE 80 MG/ML
80 INJECTION, SUSPENSION INTRA-ARTICULAR; INTRALESIONAL; INTRAMUSCULAR; SOFT TISSUE
Status: COMPLETED | OUTPATIENT
Start: 2020-08-17 | End: 2020-08-17

## 2020-08-17 RX ADMIN — METHYLPREDNISOLONE ACETATE 80 MG: 80 INJECTION, SUSPENSION INTRA-ARTICULAR; INTRALESIONAL; INTRAMUSCULAR; SOFT TISSUE at 04:08

## 2020-08-17 NOTE — PROGRESS NOTES
Subjective:       Patient ID: Tracie Scott is a 50 y.o. female.    Chief Complaint: Recurrent Skin Infections (L. arm- and back area - x 1 wk)    New to me but seen previously in clinic by a fellow provider.  Pt presents with multiple complaints. Reports left flank itchy heat rash that progressed to staph infection with left axilla and left flank abscesses. Also reports hx of right breast cancer, on Tamoxifen. Reports no menstrual bleeding since starting Tamoxifen 4.5yrs ago until 1 month ago. Now having abdominal cramping with vaginal bleeding and hot flashes and mood swings.     Abscess  Chronicity:  NewProgression Since Onset: spreading  Size:  7cm or greater  Location:  Shoulder/arm and torso  Associated Symptoms: no fever, chills, sweats  Characteristics: draining, itching, painful, redness and swelling    Characteristics: no dryness, no scaling, no peeling, no bruising and no blistering    Pain Scale:  10/10  Treatments Tried:  NSAIDs and draining/squeezing  Relieved by:  Nothing    Review of Systems   Constitutional: Positive for chills, diaphoresis and fatigue. Negative for activity change, appetite change, fever and unexpected weight change.   Respiratory: Negative for chest tightness and shortness of breath.    Cardiovascular: Negative for chest pain and palpitations.   Gastrointestinal: Negative for abdominal pain, constipation, diarrhea, nausea and vomiting.   Genitourinary: Positive for hot flashes, menstrual irregularity, menstrual problem and vaginal bleeding. Negative for decreased urine volume, difficulty urinating, dyspareunia, dysuria, genital sores, hematuria, pelvic pain, urgency, vaginal discharge, vaginal pain and vaginal dryness.   Integumentary:  Positive for rash and wound. Negative for breast discharge and breast tenderness.   Neurological: Negative for headaches.   All other systems reviewed and are negative.  Breast: Negative for tenderness        Objective:      Physical  Exam  Vitals signs and nursing note reviewed.   Constitutional:       General: She is not in acute distress.     Appearance: Normal appearance. She is well-developed. She is not ill-appearing.   HENT:      Head: Normocephalic and atraumatic.      Right Ear: External ear normal.      Left Ear: External ear normal.      Nose: Nose normal.      Mouth/Throat:      Lips: Pink.      Mouth: Mucous membranes are moist.   Eyes:      General: Lids are normal. No scleral icterus.        Right eye: No discharge.         Left eye: No discharge.      Conjunctiva/sclera: Conjunctivae normal.   Neck:      Musculoskeletal: Neck supple.      Trachea: Phonation normal.   Pulmonary:      Effort: Pulmonary effort is normal. No accessory muscle usage or respiratory distress.   Abdominal:      General: Abdomen is flat. There is no distension.   Skin:     General: Skin is warm and dry.      Findings: Abscess, erythema and rash (left back, flank, chest wall) present. Rash is pustular.          Neurological:      General: No focal deficit present.      Mental Status: She is alert and oriented to person, place, and time. Mental status is at baseline.      Motor: No abnormal muscle tone.      Gait: Gait normal.   Psychiatric:         Attention and Perception: Attention and perception normal.         Mood and Affect: Affect normal. Mood is anxious. Affect is not inappropriate.         Speech: Speech normal.         Behavior: Behavior normal. Behavior is cooperative.         Thought Content: Thought content normal. Thought content does not include homicidal or suicidal ideation. Thought content does not include homicidal or suicidal plan.         Cognition and Memory: Cognition and memory normal.         Judgment: Judgment normal.         Assessment:       1. Staph skin infection    2. Abscess of axilla, left    3. Vasomotor flushing    4. Menorrhagia with irregular cycle        Plan:       1. Staph skin infection  Apply hot compresses  frequently to promote drainage.  Reassured that this represents a benign process.  Oral antibiotics -- see med orders.  RTC PRN.  General surgeon referral for I&D  - clindamycin (CLEOCIN) 300 MG capsule; Take 1 capsule (300 mg total) by mouth every 8 (eight) hours. for 10 days  Dispense: 30 capsule; Refill: 0  - Ambulatory referral/consult to General Surgery; Future  - methylPREDNISolone acetate injection 80 mg    2. Abscess of axilla, left  Same as above   - clindamycin (CLEOCIN) 300 MG capsule; Take 1 capsule (300 mg total) by mouth every 8 (eight) hours. for 10 days  Dispense: 30 capsule; Refill: 0  - Ambulatory referral/consult to General Surgery; Future    3. Vasomotor flushing  F/u with gyn for full exam   - venlafaxine (EFFEXOR-XR) 37.5 MG 24 hr capsule; Take 1 capsule (37.5 mg total) by mouth once daily.  Dispense: 30 capsule; Refill: 11  - Ambulatory referral/consult to Obstetrics / Gynecology; Future    4. Menorrhagia with irregular cycle  Same as above  - Ambulatory referral/consult to Obstetrics / Gynecology; Future           BRIDGETTE Moreira, FNP-C  Family/Internal Medicine  [facility]

## 2020-09-09 ENCOUNTER — OFFICE VISIT (OUTPATIENT)
Dept: OBSTETRICS AND GYNECOLOGY | Facility: CLINIC | Age: 50
End: 2020-09-09
Payer: COMMERCIAL

## 2020-09-09 ENCOUNTER — LAB VISIT (OUTPATIENT)
Dept: LAB | Facility: HOSPITAL | Age: 50
End: 2020-09-09
Attending: STUDENT IN AN ORGANIZED HEALTH CARE EDUCATION/TRAINING PROGRAM
Payer: COMMERCIAL

## 2020-09-09 VITALS
BODY MASS INDEX: 29.98 KG/M2 | HEIGHT: 67 IN | DIASTOLIC BLOOD PRESSURE: 72 MMHG | RESPIRATION RATE: 14 BRPM | OXYGEN SATURATION: 100 % | SYSTOLIC BLOOD PRESSURE: 118 MMHG | HEART RATE: 84 BPM | WEIGHT: 191 LBS

## 2020-09-09 DIAGNOSIS — N93.9 ABNORMAL UTERINE BLEEDING (AUB): ICD-10-CM

## 2020-09-09 DIAGNOSIS — N30.00 ACUTE CYSTITIS WITHOUT HEMATURIA: Primary | ICD-10-CM

## 2020-09-09 PROBLEM — N92.1 MENORRHAGIA WITH IRREGULAR CYCLE: Status: RESOLVED | Noted: 2020-08-17 | Resolved: 2020-09-09

## 2020-09-09 PROCEDURE — 99213 PR OFFICE/OUTPT VISIT, EST, LEVL III, 20-29 MIN: ICD-10-PCS | Mod: S$GLB,,, | Performed by: STUDENT IN AN ORGANIZED HEALTH CARE EDUCATION/TRAINING PROGRAM

## 2020-09-09 PROCEDURE — 82670 ASSAY OF TOTAL ESTRADIOL: CPT

## 2020-09-09 PROCEDURE — 87088 URINE BACTERIA CULTURE: CPT

## 2020-09-09 PROCEDURE — 3008F BODY MASS INDEX DOCD: CPT | Mod: CPTII,S$GLB,, | Performed by: STUDENT IN AN ORGANIZED HEALTH CARE EDUCATION/TRAINING PROGRAM

## 2020-09-09 PROCEDURE — 99213 OFFICE O/P EST LOW 20 MIN: CPT | Mod: S$GLB,,, | Performed by: STUDENT IN AN ORGANIZED HEALTH CARE EDUCATION/TRAINING PROGRAM

## 2020-09-09 PROCEDURE — 99999 PR PBB SHADOW E&M-EST. PATIENT-LVL III: CPT | Mod: PBBFAC,,, | Performed by: STUDENT IN AN ORGANIZED HEALTH CARE EDUCATION/TRAINING PROGRAM

## 2020-09-09 PROCEDURE — 87077 CULTURE AEROBIC IDENTIFY: CPT

## 2020-09-09 PROCEDURE — 99999 PR PBB SHADOW E&M-EST. PATIENT-LVL III: ICD-10-PCS | Mod: PBBFAC,,, | Performed by: STUDENT IN AN ORGANIZED HEALTH CARE EDUCATION/TRAINING PROGRAM

## 2020-09-09 PROCEDURE — 87086 URINE CULTURE/COLONY COUNT: CPT

## 2020-09-09 PROCEDURE — 3008F PR BODY MASS INDEX (BMI) DOCUMENTED: ICD-10-PCS | Mod: CPTII,S$GLB,, | Performed by: STUDENT IN AN ORGANIZED HEALTH CARE EDUCATION/TRAINING PROGRAM

## 2020-09-09 PROCEDURE — 87186 SC STD MICRODIL/AGAR DIL: CPT

## 2020-09-09 PROCEDURE — 83001 ASSAY OF GONADOTROPIN (FSH): CPT

## 2020-09-09 PROCEDURE — 36415 COLL VENOUS BLD VENIPUNCTURE: CPT

## 2020-09-09 RX ORDER — NITROFURANTOIN 25; 75 MG/1; MG/1
100 CAPSULE ORAL 2 TIMES DAILY
Qty: 14 CAPSULE | Refills: 0 | Status: SHIPPED | OUTPATIENT
Start: 2020-09-09 | End: 2020-09-16

## 2020-09-09 NOTE — ASSESSMENT & PLAN NOTE
- symptoms of UTI present  - urine dipstick +leukocytes/+nitrites  - prescription sent to the pharmacy for Macrobid 100mg BID x 7 days  - urine culture sent

## 2020-09-09 NOTE — PROGRESS NOTES
Subjective:    Patient ID: Tracie Scott is a 50 y.o. y.o. female.     Chief Complaint:   Chief Complaint   Patient presents with    Urinary Urgency    Urinary Tract Infection       History of Present Illness:  Tracie presents today complaining of abdominal pain, dysuria, urinary frequency and urinary urgency. Symptoms have been present for 9 days and have been stable.     The patient is also on tamoxifen treatment for the diagnosis of breast cancer in 2015. She is reporting 2 episodes of abnormal uterine bleeding during that time. One episode last week. She is unsure of her menopausal status at this time.       ROS:   CONSTITUTIONAL: diaphoresis  CARDIOVASCULAR: no chest pain or dyspnea on exertion  RESPIRATORY: positive for - shortness of breath (common symptom for her since breast cancer treatment)  BREAST: negative for breast  tenderness, breast mass, nipple discharge, or skin changes  GASTROINTESTINAL: constipation  GENITOURINARY: dysuria, frequency/urgency  HEMATOLOGIC/LYMPHATIC: negative for swollen lymph nodes, bleeding, bruising  MUSCULOSKELETAL: negative for back pain, joint pain, joint stiffness, joint swelling, muscle pain, muscle weakness  ENDOCRINE: negative for polydipsia/polyuria, palpitations, skin changes, temperature intolerance, unexpected weight changes  ALLERGY and IMMUNOLOGY: negative  NEUROLOGICAL: negative  DERMATOLOGICAL: negative      Objective:    Vital Signs:  Vitals:    09/09/20 1347   BP: 118/72   Pulse: 84   Resp: 14       Physical Exam:  General:  alert,normal appearing gravid female   Skin:  Skin color, texture, turgor normal. No rashes or lesions   Abdomen:  soft, non-tender; bowel sounds normal   Pelvis: External genitalia: normal general appearance  Urinary system: urethral meatus normal, bladder tender to palpation  Vaginal: normal mucosa without prolapse or lesions           Assessment:      1. Acute cystitis without hematuria    2. Abnormal uterine bleeding (AUB)           Plan:      Problem List Items Addressed This Visit        Renal/    Acute cystitis without hematuria - Primary     - symptoms of UTI present  - urine dipstick +leukocytes/+nitrites  - prescription sent to the pharmacy for Macrobid 100mg BID x 7 days  - urine culture sent          Relevant Medications    nitrofurantoin, macrocrystal-monohydrate, (MACROBID) 100 MG capsule    Other Relevant Orders    POCT urinalysis, dipstick or tablet reag    Urine culture    Abnormal uterine bleeding (AUB)     - patient currently on tamoxifen treatment  - will obtain FSH/estrodial levels to establish if the patient is pre or post-menopausal  - if post-menopausal, will consider further evaluation of bleeding          Relevant Orders    Follicle stimulating hormone    Estradiol

## 2020-09-09 NOTE — ASSESSMENT & PLAN NOTE
- patient currently on tamoxifen treatment  - will obtain FSH/estrodial levels to establish if the patient is pre or post-menopausal  - if post-menopausal, will consider further evaluation of bleeding

## 2020-09-10 LAB
ESTRADIOL SERPL-MCNC: 14 PG/ML
FSH SERPL-ACNC: 21.7 MIU/ML

## 2020-09-12 LAB — BACTERIA UR CULT: ABNORMAL

## 2020-09-23 ENCOUNTER — OFFICE VISIT (OUTPATIENT)
Dept: NEUROLOGY | Facility: CLINIC | Age: 50
End: 2020-09-23
Payer: COMMERCIAL

## 2020-09-23 ENCOUNTER — HOSPITAL ENCOUNTER (OUTPATIENT)
Dept: RADIOLOGY | Facility: HOSPITAL | Age: 50
Discharge: HOME | End: 2020-09-23
Attending: NURSE PRACTITIONER
Payer: COMMERCIAL

## 2020-09-23 VITALS
RESPIRATION RATE: 16 BRPM | DIASTOLIC BLOOD PRESSURE: 83 MMHG | SYSTOLIC BLOOD PRESSURE: 129 MMHG | WEIGHT: 193.81 LBS | TEMPERATURE: 97 F | BODY MASS INDEX: 30.42 KG/M2 | HEART RATE: 64 BPM | HEIGHT: 67 IN

## 2020-09-23 DIAGNOSIS — R20.0 RIGHT ARM NUMBNESS: ICD-10-CM

## 2020-09-23 DIAGNOSIS — M77.9 TENDINITIS: Primary | ICD-10-CM

## 2020-09-23 DIAGNOSIS — R29.898 RIGHT ARM WEAKNESS: ICD-10-CM

## 2020-09-23 DIAGNOSIS — G56.01 RIGHT CARPAL TUNNEL SYNDROME: ICD-10-CM

## 2020-09-23 PROCEDURE — 99999 PR PBB SHADOW E&M-EST. PATIENT-LVL IV: CPT | Mod: PBBFAC,,, | Performed by: NURSE PRACTITIONER

## 2020-09-23 PROCEDURE — 73030 X-RAY EXAM OF SHOULDER: CPT | Mod: TC,RT

## 2020-09-23 PROCEDURE — 99214 OFFICE O/P EST MOD 30 MIN: CPT | Mod: S$GLB,,, | Performed by: NURSE PRACTITIONER

## 2020-09-23 PROCEDURE — 73030 X-RAY EXAM OF SHOULDER: CPT | Mod: 26,RT,, | Performed by: RADIOLOGY

## 2020-09-23 PROCEDURE — 3008F BODY MASS INDEX DOCD: CPT | Mod: CPTII,S$GLB,, | Performed by: NURSE PRACTITIONER

## 2020-09-23 PROCEDURE — 73030 XR SHOULDER COMPLETE 2 OR MORE VIEWS RIGHT: ICD-10-PCS | Mod: 26,RT,, | Performed by: RADIOLOGY

## 2020-09-23 PROCEDURE — 3008F PR BODY MASS INDEX (BMI) DOCUMENTED: ICD-10-PCS | Mod: CPTII,S$GLB,, | Performed by: NURSE PRACTITIONER

## 2020-09-23 PROCEDURE — 99214 PR OFFICE/OUTPT VISIT, EST, LEVL IV, 30-39 MIN: ICD-10-PCS | Mod: S$GLB,,, | Performed by: NURSE PRACTITIONER

## 2020-09-23 PROCEDURE — 99999 PR PBB SHADOW E&M-EST. PATIENT-LVL IV: ICD-10-PCS | Mod: PBBFAC,,, | Performed by: NURSE PRACTITIONER

## 2020-09-23 RX ORDER — MELOXICAM 15 MG/1
15 TABLET ORAL DAILY
Qty: 14 TABLET | Refills: 0 | Status: SHIPPED | OUTPATIENT
Start: 2020-09-23 | End: 2022-03-30

## 2020-09-23 NOTE — PROGRESS NOTES
HPI: Tracie Scott is a 50 y.o. female with history of lumbar pain most of her life, worsening and radiating across the left hip and never down the left leg, as well as right CTS. History of breast cancer.     She presents today with complaint of right hand paresthesias x 2 weeks, with pressure radiating from the elbow to the fingers, with pain in the right thumb, which progressed to pressure from below the right shoulder to her right thumb and fingers. She has tenderness to her right forearm. This has occurred for years, intermittently-since lymph node removal for breast cancer prior.     Denies neck pain. She does drop items at times.     She uses her right arm frequently, as she is a .     Last EMG BUE showed ongoing mild right CTS. Pamelor was increased at her last visit in 7/2019.     Denies lumbar pain currently.     Review of Systems   Constitutional: Negative for fever.   HENT: Negative for nosebleeds.    Eyes: Negative for double vision.   Respiratory: Negative for hemoptysis.    Cardiovascular: Negative for leg swelling.   Gastrointestinal: Negative for blood in stool.   Genitourinary: Negative for hematuria.   Musculoskeletal: Negative for back pain and neck pain.   Skin: Negative for rash.   Neurological: Positive for sensory change.   Psychiatric/Behavioral: The patient does not have insomnia.       I have reviewed all of this patient's past medical and surgical histories as well as family and social histories and active allergies and medications as documented in the electronic medical record.    Exam:  Gen Appearance, well developed/nourished in no apparent distress  CV: 2+ distal pulses with no edema or swelling  Neuro:  MS: Awake, alert,Sustains attention. Recent/remote memory intact, Language is full to spontaneous speech/naming/comprehension. Fund of Knowledge is full  CN: Optic discs are flat with normal vasculature, PERRL, Extraoccular movements and visual fields are full. Normal  facial sensation and strength, Hearing symmetric, Tongue and Palate are midline and strong. Shoulder Shrug symmetric and strong.  Motor: Normal bulk, tone, no abnormal movements. 5/5 strength bilateral upper/lower extremities with 2+ reflexes and no clonus  Sensory: symmetric to light touch, pain, temp, and vibration but some decrease to pin on the right forearm, Romberg negative  Cerebellar: Finger-nose,Heal-shin, Rapid alternating movements intact  Gait: Normal stance, no ataxia  -Tinel's sign  MSK survey: tenderness to right forearm-palpation produced paresthesias to hand.     EM/2016: Mild Right Carpal Tunnel Syndrome.  No Large fiber Brachial Plexopathy could be detected     EMG 2019:    Impression:  Abnormal Study secondary to the Presence of:    Mild Right Carpal Tunnel Syndrome, largely unchanged since 2016 EMG/NSC  No Large fiber Brachial Plexopathy could be detected     Assessment/Plan: Tracie Scott is a 50 y.o. female with left lumbar radicular pain in 2016 which is resolved. Presenting with worsening of her chronic right arm numbness/ neuropathic type pain.     I recommend:   1. MRI C-spine without to evaluate for structural issues, which may explain her RUE complaints. She has had this complaint occur intermittently.   -Right shoulder X-rays.   -she does seem to have tendinitis of the right forearm.   -she also has right CTS. Bracing ineffective for current complaints.   2. Start Mobic for RUE tendinitis-14 days, then stop.    3. Consider referral to Ortho hand specialist/Dr. Saunders if appropriate.   4. She had mild Right CTS by 2016 EMG of the arms NO large fiber brachial plexopathy found on 2016 EMG--she has had some arm numbness since axillary lymph node dissection in 2015  And this was worse in 2019. Updated EMG BUE was overall unchanged.   -She did see General Surgery who performed mammogram and axially US which were unremarkable and she states surgeon felt her symptoms could  be due to scarring   -She states PT made her symptoms worse, Gabapentin was not tolerated  5. MRI Lumbar regarding  her left lumbar radicular pain was never done as recommended  -fortunately this pain is resolved and does not require further work up  6. Continue Pamelor for RUE complaints, though this is not fully effective and Gabapentin was poorly tolerated prior.     FU 3 months/will call with results

## 2020-09-30 ENCOUNTER — HOSPITAL ENCOUNTER (OUTPATIENT)
Dept: RADIOLOGY | Facility: HOSPITAL | Age: 50
Discharge: HOME | End: 2020-09-30
Attending: NURSE PRACTITIONER
Payer: COMMERCIAL

## 2020-09-30 DIAGNOSIS — R20.0 RIGHT ARM NUMBNESS: ICD-10-CM

## 2020-09-30 DIAGNOSIS — R29.898 RIGHT ARM WEAKNESS: ICD-10-CM

## 2020-09-30 PROCEDURE — 72141 MRI CERVICAL SPINE WITHOUT CONTRAST: ICD-10-PCS | Mod: 26,,, | Performed by: RADIOLOGY

## 2020-09-30 PROCEDURE — 72141 MRI NECK SPINE W/O DYE: CPT | Mod: 26,,, | Performed by: RADIOLOGY

## 2020-09-30 PROCEDURE — 72141 MRI NECK SPINE W/O DYE: CPT | Mod: TC

## 2020-10-02 ENCOUNTER — TELEPHONE (OUTPATIENT)
Dept: NEUROLOGY | Facility: CLINIC | Age: 50
End: 2020-10-02

## 2020-10-02 DIAGNOSIS — R20.0 RIGHT ARM NUMBNESS: ICD-10-CM

## 2020-10-02 DIAGNOSIS — M79.601 RIGHT ARM PAIN: Primary | ICD-10-CM

## 2020-10-02 NOTE — TELEPHONE ENCOUNTER
----- Message from Keiko Brooks sent at 10/2/2020 11:28 AM CDT -----  Contact: PATIENT  Tracie Scott  MRN: [id number]  : 1970  PCP: Primary Doctor No  Home Phone      [phone]  Work Phone      Not on file.  Mobile          [phone]      MESSAGE: Patient is calling for the results of her MRI.        Phone: [phone]

## 2020-10-02 NOTE — TELEPHONE ENCOUNTER
Informed pt of MRI results, pt states she would like to be seen by a hand specialist in [place]. Also states that the antiinflammatory is not helping at all.

## 2020-10-06 NOTE — TELEPHONE ENCOUNTER
Orders placed for referral to Dr. Anton/hand specialist in [place]. Please send copy of last note, as well as her MRI C-spine and Right Shoulder X-ray results.

## 2020-11-13 ENCOUNTER — HOSPITAL ENCOUNTER (EMERGENCY)
Facility: HOSPITAL | Age: 50
Discharge: HOME | End: 2020-11-13
Attending: SURGERY
Payer: COMMERCIAL

## 2020-11-13 VITALS
OXYGEN SATURATION: 100 % | TEMPERATURE: 97 F | HEART RATE: 76 BPM | SYSTOLIC BLOOD PRESSURE: 146 MMHG | RESPIRATION RATE: 20 BRPM | DIASTOLIC BLOOD PRESSURE: 74 MMHG
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
## 2020-11-13 DIAGNOSIS — T14.90XA TRAUMA: ICD-10-CM

## 2020-11-13 DIAGNOSIS — M54.2 NECK PAIN: ICD-10-CM

## 2020-11-13 PROCEDURE — 99284 EMERGENCY DEPT VISIT MOD MDM: CPT | Mod: 25

## 2020-11-13 PROCEDURE — 25000003 PHARM REV CODE 250: Performed by: SURGERY

## 2020-11-13 RX ORDER — CYCLOBENZAPRINE HCL 10 MG
10 TABLET ORAL 3 TIMES DAILY PRN
Qty: 10 TABLET | Refills: 0 | Status: SHIPPED | OUTPATIENT
Start: 2020-11-13 | End: 2020-11-18

## 2020-11-13 RX ORDER — ACETAMINOPHEN 500 MG
1000 TABLET ORAL
Status: COMPLETED | OUTPATIENT
Start: 2020-11-13 | End: 2020-11-13

## 2020-11-13 RX ORDER — IBUPROFEN 800 MG/1
800 TABLET ORAL
Status: COMPLETED | OUTPATIENT
Start: 2020-11-13 | End: 2020-11-13

## 2020-11-13 RX ORDER — KETOROLAC TROMETHAMINE 10 MG/1
10 TABLET, FILM COATED ORAL EVERY 6 HOURS PRN
Qty: 15 TABLET | Refills: 0 | Status: SHIPPED | OUTPATIENT
Start: 2020-11-13 | End: 2022-03-30

## 2020-11-13 RX ADMIN — ACETAMINOPHEN 1000 MG: 500 TABLET ORAL at 04:11

## 2020-11-13 RX ADMIN — IBUPROFEN 800 MG: 800 TABLET, FILM COATED ORAL at 04:11
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
## 2020-11-18 ENCOUNTER — OFFICE VISIT (OUTPATIENT)
Dept: INTERNAL MEDICINE | Facility: CLINIC | Age: 50
End: 2020-11-18
Payer: COMMERCIAL

## 2020-11-18 VITALS
DIASTOLIC BLOOD PRESSURE: 84 MMHG | BODY MASS INDEX: 30.93 KG/M2 | SYSTOLIC BLOOD PRESSURE: 124 MMHG | WEIGHT: 197.06 LBS | HEIGHT: 67 IN | HEART RATE: 93 BPM | OXYGEN SATURATION: 99 % | RESPIRATION RATE: 18 BRPM | TEMPERATURE: 97 F

## 2020-11-18 DIAGNOSIS — S16.1XXD STRAIN OF NECK MUSCLE, SUBSEQUENT ENCOUNTER: ICD-10-CM
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
## 2020-11-18 PROCEDURE — 99213 OFFICE O/P EST LOW 20 MIN: CPT | Mod: S$GLB,,, | Performed by: FAMILY MEDICINE

## 2020-11-18 PROCEDURE — 1126F PR PAIN SEVERITY QUANTIFIED, NO PAIN PRESENT: ICD-10-PCS | Mod: S$GLB,,, | Performed by: FAMILY MEDICINE

## 2020-11-18 PROCEDURE — 99213 PR OFFICE/OUTPT VISIT, EST, LEVL III, 20-29 MIN: ICD-10-PCS | Mod: S$GLB,,, | Performed by: FAMILY MEDICINE

## 2020-11-18 PROCEDURE — 3008F PR BODY MASS INDEX (BMI) DOCUMENTED: ICD-10-PCS | Mod: CPTII,S$GLB,, | Performed by: FAMILY MEDICINE

## 2020-11-18 PROCEDURE — 1126F AMNT PAIN NOTED NONE PRSNT: CPT | Mod: S$GLB,,, | Performed by: FAMILY MEDICINE

## 2020-11-18 PROCEDURE — 99999 PR PBB SHADOW E&M-EST. PATIENT-LVL IV: ICD-10-PCS | Mod: PBBFAC,,, | Performed by: FAMILY MEDICINE

## 2020-11-18 PROCEDURE — 3008F BODY MASS INDEX DOCD: CPT | Mod: CPTII,S$GLB,, | Performed by: FAMILY MEDICINE

## 2020-11-18 PROCEDURE — 99999 PR PBB SHADOW E&M-EST. PATIENT-LVL IV: CPT | Mod: PBBFAC,,, | Performed by: FAMILY MEDICINE
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
## 2021-03-25 ENCOUNTER — TELEPHONE (OUTPATIENT)
Dept: NEUROLOGY | Facility: CLINIC | Age: 51
End: 2021-03-25

## 2021-05-04 ENCOUNTER — PATIENT MESSAGE (OUTPATIENT)
Dept: RESEARCH | Facility: HOSPITAL | Age: 51
End: 2021-05-04

## 2021-06-07 ENCOUNTER — TELEPHONE (OUTPATIENT)
Dept: FAMILY MEDICINE | Facility: CLINIC | Age: 51
End: 2021-06-07

## 2021-08-02 ENCOUNTER — OFFICE VISIT (OUTPATIENT)
Dept: URGENT CARE | Facility: CLINIC | Age: 51
End: 2021-08-02
Payer: COMMERCIAL

## 2021-08-02 VITALS
OXYGEN SATURATION: 98 % | BODY MASS INDEX: 30.92 KG/M2 | HEIGHT: 67 IN | RESPIRATION RATE: 18 BRPM | SYSTOLIC BLOOD PRESSURE: 128 MMHG | TEMPERATURE: 98 F | WEIGHT: 197 LBS | DIASTOLIC BLOOD PRESSURE: 79 MMHG | HEART RATE: 96 BPM

## 2021-08-02 DIAGNOSIS — Z20.822 ENCOUNTER FOR LABORATORY TESTING FOR COVID-19 VIRUS: Primary | ICD-10-CM

## 2021-08-02 DIAGNOSIS — U07.1 COVID-19 VIRUS INFECTION: ICD-10-CM

## 2021-08-02 DIAGNOSIS — U07.1 COVID-19 VIRUS DETECTED: ICD-10-CM

## 2021-08-02 LAB
CTP QC/QA: YES
SARS-COV-2 RDRP RESP QL NAA+PROBE: POSITIVE

## 2021-08-02 PROCEDURE — 1159F MED LIST DOCD IN RCRD: CPT | Mod: CPTII,S$GLB,, | Performed by: PHYSICIAN ASSISTANT

## 2021-08-02 PROCEDURE — U0002 COVID-19 LAB TEST NON-CDC: HCPCS | Mod: QW,S$GLB,, | Performed by: PHYSICIAN ASSISTANT

## 2021-08-02 PROCEDURE — 3078F DIAST BP <80 MM HG: CPT | Mod: CPTII,S$GLB,, | Performed by: PHYSICIAN ASSISTANT

## 2021-08-02 PROCEDURE — 1159F PR MEDICATION LIST DOCUMENTED IN MEDICAL RECORD: ICD-10-PCS | Mod: CPTII,S$GLB,, | Performed by: PHYSICIAN ASSISTANT

## 2021-08-02 PROCEDURE — 1160F RVW MEDS BY RX/DR IN RCRD: CPT | Mod: CPTII,S$GLB,, | Performed by: PHYSICIAN ASSISTANT

## 2021-08-02 PROCEDURE — U0002: ICD-10-PCS | Mod: QW,S$GLB,, | Performed by: PHYSICIAN ASSISTANT

## 2021-08-02 PROCEDURE — 1125F PR PAIN SEVERITY QUANTIFIED, PAIN PRESENT: ICD-10-PCS | Mod: CPTII,S$GLB,, | Performed by: PHYSICIAN ASSISTANT

## 2021-08-02 PROCEDURE — 3008F BODY MASS INDEX DOCD: CPT | Mod: CPTII,S$GLB,, | Performed by: PHYSICIAN ASSISTANT

## 2021-08-02 PROCEDURE — 3074F PR MOST RECENT SYSTOLIC BLOOD PRESSURE < 130 MM HG: ICD-10-PCS | Mod: CPTII,S$GLB,, | Performed by: PHYSICIAN ASSISTANT

## 2021-08-02 PROCEDURE — 3078F PR MOST RECENT DIASTOLIC BLOOD PRESSURE < 80 MM HG: ICD-10-PCS | Mod: CPTII,S$GLB,, | Performed by: PHYSICIAN ASSISTANT

## 2021-08-02 PROCEDURE — 99214 PR OFFICE/OUTPT VISIT, EST, LEVL IV, 30-39 MIN: ICD-10-PCS | Mod: S$GLB,CS,, | Performed by: PHYSICIAN ASSISTANT

## 2021-08-02 PROCEDURE — 1160F PR REVIEW ALL MEDS BY PRESCRIBER/CLIN PHARMACIST DOCUMENTED: ICD-10-PCS | Mod: CPTII,S$GLB,, | Performed by: PHYSICIAN ASSISTANT

## 2021-08-02 PROCEDURE — 3074F SYST BP LT 130 MM HG: CPT | Mod: CPTII,S$GLB,, | Performed by: PHYSICIAN ASSISTANT

## 2021-08-02 PROCEDURE — 1125F AMNT PAIN NOTED PAIN PRSNT: CPT | Mod: CPTII,S$GLB,, | Performed by: PHYSICIAN ASSISTANT

## 2021-08-02 PROCEDURE — 99214 OFFICE O/P EST MOD 30 MIN: CPT | Mod: S$GLB,CS,, | Performed by: PHYSICIAN ASSISTANT

## 2021-08-02 PROCEDURE — 3008F PR BODY MASS INDEX (BMI) DOCUMENTED: ICD-10-PCS | Mod: CPTII,S$GLB,, | Performed by: PHYSICIAN ASSISTANT

## 2021-08-02 RX ORDER — FLUTICASONE PROPIONATE 50 MCG
1 SPRAY, SUSPENSION (ML) NASAL 2 TIMES DAILY PRN
Qty: 15 G | Refills: 0 | Status: SHIPPED | OUTPATIENT
Start: 2021-08-02 | End: 2022-03-30

## 2021-08-02 RX ORDER — ALBUTEROL SULFATE 90 UG/1
2 AEROSOL, METERED RESPIRATORY (INHALATION) EVERY 6 HOURS PRN
Qty: 18 G | Refills: 0 | Status: SHIPPED | OUTPATIENT
Start: 2021-08-02 | End: 2022-03-30

## 2021-08-02 RX ORDER — PREDNISONE 20 MG/1
20 TABLET ORAL DAILY
Qty: 5 TABLET | Refills: 0 | Status: SHIPPED | OUTPATIENT
Start: 2021-08-02 | End: 2021-08-07

## 2021-08-02 RX ORDER — PROMETHAZINE HYDROCHLORIDE AND DEXTROMETHORPHAN HYDROBROMIDE 6.25; 15 MG/5ML; MG/5ML
5 SYRUP ORAL EVERY 4 HOURS PRN
Qty: 180 ML | Refills: 0 | Status: SHIPPED | OUTPATIENT
Start: 2021-08-02 | End: 2021-08-12

## 2021-08-02 RX ORDER — AZITHROMYCIN 250 MG/1
250 TABLET, FILM COATED ORAL SEE ADMIN INSTRUCTIONS
Qty: 6 TABLET | Refills: 0 | Status: SHIPPED | OUTPATIENT
Start: 2021-08-02 | End: 2022-03-30

## 2021-08-02 RX ORDER — GUAIFENESIN 600 MG/1
1200 TABLET, EXTENDED RELEASE ORAL 2 TIMES DAILY
Qty: 40 TABLET | Refills: 0 | Status: SHIPPED | OUTPATIENT
Start: 2021-08-02 | End: 2021-08-12

## 2021-08-04 ENCOUNTER — INFUSION (OUTPATIENT)
Dept: INFECTIOUS DISEASES | Facility: HOSPITAL | Age: 51
End: 2021-08-04
Attending: FAMILY MEDICINE
Payer: COMMERCIAL

## 2021-08-04 VITALS
TEMPERATURE: 99 F | HEART RATE: 65 BPM | RESPIRATION RATE: 18 BRPM | DIASTOLIC BLOOD PRESSURE: 66 MMHG | OXYGEN SATURATION: 99 % | SYSTOLIC BLOOD PRESSURE: 110 MMHG

## 2021-08-04 DIAGNOSIS — U07.1 COVID-19: Primary | ICD-10-CM

## 2021-08-04 PROCEDURE — 25000003 PHARM REV CODE 250: Performed by: FAMILY MEDICINE

## 2021-08-04 PROCEDURE — M0243 CASIRIVI AND IMDEVI INFUSION: HCPCS | Performed by: FAMILY MEDICINE

## 2021-08-04 PROCEDURE — 63600175 PHARM REV CODE 636 W HCPCS: Performed by: FAMILY MEDICINE

## 2021-08-04 RX ORDER — DIPHENHYDRAMINE HYDROCHLORIDE 50 MG/ML
25 INJECTION INTRAMUSCULAR; INTRAVENOUS ONCE AS NEEDED
Status: ACTIVE | OUTPATIENT
Start: 2021-08-04 | End: 2032-12-30

## 2021-08-04 RX ORDER — ACETAMINOPHEN 325 MG/1
650 TABLET ORAL ONCE AS NEEDED
Status: ACTIVE | OUTPATIENT
Start: 2021-08-04 | End: 2032-12-30

## 2021-08-04 RX ORDER — SODIUM CHLORIDE 0.9 % (FLUSH) 0.9 %
10 SYRINGE (ML) INJECTION
Status: ACTIVE | OUTPATIENT
Start: 2021-08-04

## 2021-08-04 RX ORDER — EPINEPHRINE 0.3 MG/.3ML
0.3 INJECTION SUBCUTANEOUS
Status: ACTIVE | OUTPATIENT
Start: 2021-08-04

## 2021-08-04 RX ORDER — ONDANSETRON 4 MG/1
4 TABLET, ORALLY DISINTEGRATING ORAL ONCE AS NEEDED
Status: ACTIVE | OUTPATIENT
Start: 2021-08-04 | End: 2032-12-30

## 2021-08-04 RX ADMIN — CASIRIVIMAB AND IMDEVIMAB 600 MG: 600; 600 INJECTION, SOLUTION, CONCENTRATE INTRAVENOUS at 08:08

## 2021-09-21 ENCOUNTER — TELEPHONE (OUTPATIENT)
Dept: FAMILY MEDICINE | Facility: CLINIC | Age: 51
End: 2021-09-21

## 2021-12-24 ENCOUNTER — HOSPITAL ENCOUNTER (EMERGENCY)
Facility: HOSPITAL | Age: 51
Discharge: HOME | End: 2021-12-24
Attending: SURGERY
Payer: COMMERCIAL

## 2021-12-24 VITALS
RESPIRATION RATE: 16 BRPM | DIASTOLIC BLOOD PRESSURE: 74 MMHG | WEIGHT: 191.81 LBS | BODY MASS INDEX: 30.04 KG/M2 | OXYGEN SATURATION: 100 % | HEART RATE: 81 BPM | SYSTOLIC BLOOD PRESSURE: 150 MMHG | TEMPERATURE: 98 F

## 2021-12-24 DIAGNOSIS — L08.9 SKIN INFECTION: Primary | ICD-10-CM

## 2021-12-24 LAB
ALBUMIN SERPL BCP-MCNC: 4.4 G/DL (ref 3.5–5.2)
ALP SERPL-CCNC: 72 U/L (ref 55–135)
ALT SERPL W/O P-5'-P-CCNC: 17 U/L (ref 10–44)
ANION GAP SERPL CALC-SCNC: 12 MMOL/L (ref 8–16)
AST SERPL-CCNC: 19 U/L (ref 10–40)
BASOPHILS # BLD AUTO: 0.06 K/UL (ref 0–0.2)
BASOPHILS NFR BLD: 0.5 % (ref 0–1.9)
BILIRUB SERPL-MCNC: 0.6 MG/DL (ref 0.1–1)
BUN SERPL-MCNC: 15 MG/DL (ref 6–20)
CALCIUM SERPL-MCNC: 9.7 MG/DL (ref 8.7–10.5)
CHLORIDE SERPL-SCNC: 104 MMOL/L (ref 95–110)
CO2 SERPL-SCNC: 27 MMOL/L (ref 23–29)
CREAT SERPL-MCNC: 0.9 MG/DL (ref 0.5–1.4)
DIFFERENTIAL METHOD: ABNORMAL
EOSINOPHIL # BLD AUTO: 0.4 K/UL (ref 0–0.5)
EOSINOPHIL NFR BLD: 2.8 % (ref 0–8)
ERYTHROCYTE [DISTWIDTH] IN BLOOD BY AUTOMATED COUNT: 12.1 % (ref 11.5–14.5)
EST. GFR  (AFRICAN AMERICAN): >60 ML/MIN/1.73 M^2
EST. GFR  (NON AFRICAN AMERICAN): >60 ML/MIN/1.73 M^2
GLUCOSE SERPL-MCNC: 136 MG/DL (ref 70–110)
HCT VFR BLD AUTO: 38.5 % (ref 37–48.5)
HGB BLD-MCNC: 12.8 G/DL (ref 12–16)
IMM GRANULOCYTES # BLD AUTO: 0.04 K/UL (ref 0–0.04)
IMM GRANULOCYTES NFR BLD AUTO: 0.3 % (ref 0–0.5)
LACTATE SERPL-SCNC: 1.6 MMOL/L (ref 0.5–2.2)
LYMPHOCYTES # BLD AUTO: 4 K/UL (ref 1–4.8)
LYMPHOCYTES NFR BLD: 32.6 % (ref 18–48)
MCH RBC QN AUTO: 29.5 PG (ref 27–31)
MCHC RBC AUTO-ENTMCNC: 33.2 G/DL (ref 32–36)
MCV RBC AUTO: 89 FL (ref 82–98)
MONOCYTES # BLD AUTO: 0.6 K/UL (ref 0.3–1)
MONOCYTES NFR BLD: 4.8 % (ref 4–15)
NEUTROPHILS # BLD AUTO: 7.3 K/UL (ref 1.8–7.7)
NEUTROPHILS NFR BLD: 59 % (ref 38–73)
NRBC BLD-RTO: 0 /100 WBC
PLATELET # BLD AUTO: 357 K/UL (ref 150–450)
PMV BLD AUTO: 9 FL (ref 9.2–12.9)
POTASSIUM SERPL-SCNC: 3.7 MMOL/L (ref 3.5–5.1)
PROT SERPL-MCNC: 7.5 G/DL (ref 6–8.4)
RBC # BLD AUTO: 4.34 M/UL (ref 4–5.4)
SODIUM SERPL-SCNC: 143 MMOL/L (ref 136–145)
WBC # BLD AUTO: 12.34 K/UL (ref 3.9–12.7)

## 2021-12-24 PROCEDURE — 80053 COMPREHEN METABOLIC PANEL: CPT | Performed by: SURGERY

## 2021-12-24 PROCEDURE — 83605 ASSAY OF LACTIC ACID: CPT | Performed by: SURGERY

## 2021-12-24 PROCEDURE — 25000003 PHARM REV CODE 250: Performed by: SURGERY

## 2021-12-24 PROCEDURE — 99284 EMERGENCY DEPT VISIT MOD MDM: CPT | Mod: 25

## 2021-12-24 PROCEDURE — 87040 BLOOD CULTURE FOR BACTERIA: CPT | Performed by: SURGERY

## 2021-12-24 PROCEDURE — 36415 COLL VENOUS BLD VENIPUNCTURE: CPT | Performed by: SURGERY

## 2021-12-24 PROCEDURE — 85025 COMPLETE CBC W/AUTO DIFF WBC: CPT | Performed by: SURGERY

## 2021-12-24 PROCEDURE — 63600175 PHARM REV CODE 636 W HCPCS: Performed by: SURGERY

## 2021-12-24 PROCEDURE — 96372 THER/PROPH/DIAG INJ SC/IM: CPT

## 2021-12-24 RX ORDER — MUPIROCIN 20 MG/G
1 OINTMENT TOPICAL
Status: COMPLETED | OUTPATIENT
Start: 2021-12-24 | End: 2021-12-24

## 2021-12-24 RX ORDER — MUPIROCIN 20 MG/G
OINTMENT TOPICAL 3 TIMES DAILY
Qty: 15 G | Refills: 0 | Status: SHIPPED | OUTPATIENT
Start: 2021-12-24 | End: 2022-01-03

## 2021-12-24 RX ORDER — CLINDAMYCIN PHOSPHATE 150 MG/ML
600 INJECTION, SOLUTION INTRAVENOUS
Status: COMPLETED | OUTPATIENT
Start: 2021-12-24 | End: 2021-12-24

## 2021-12-24 RX ORDER — ONDANSETRON 2 MG/ML
4 INJECTION INTRAMUSCULAR; INTRAVENOUS
Status: COMPLETED | OUTPATIENT
Start: 2021-12-24 | End: 2021-12-24

## 2021-12-24 RX ORDER — CLINDAMYCIN HYDROCHLORIDE 300 MG/1
300 CAPSULE ORAL 4 TIMES DAILY
Qty: 28 CAPSULE | Refills: 0 | Status: SHIPPED | OUTPATIENT
Start: 2021-12-24 | End: 2021-12-31

## 2021-12-24 RX ORDER — MORPHINE SULFATE 2 MG/ML
2 INJECTION, SOLUTION INTRAMUSCULAR; INTRAVENOUS
Status: COMPLETED | OUTPATIENT
Start: 2021-12-24 | End: 2021-12-24

## 2021-12-24 RX ADMIN — MUPIROCIN 22 G: 20 OINTMENT TOPICAL at 11:12

## 2021-12-24 RX ADMIN — CLINDAMYCIN PHOSPHATE 600 MG: 150 INJECTION, SOLUTION INTRAVENOUS at 11:12

## 2021-12-24 RX ADMIN — ONDANSETRON HYDROCHLORIDE 4 MG: 2 SOLUTION INTRAMUSCULAR; INTRAVENOUS at 11:12

## 2021-12-24 RX ADMIN — MORPHINE SULFATE 2 MG: 2 INJECTION, SOLUTION INTRAMUSCULAR; INTRAVENOUS at 11:12

## 2021-12-25 RX ORDER — IBUPROFEN 800 MG/1
800 TABLET ORAL EVERY 6 HOURS PRN
Qty: 20 TABLET | Refills: 0 | Status: SHIPPED | OUTPATIENT
Start: 2021-12-25 | End: 2023-01-24

## 2021-12-25 RX ORDER — HYDROCODONE BITARTRATE AND ACETAMINOPHEN 5; 325 MG/1; MG/1
1 TABLET ORAL EVERY 4 HOURS PRN
Qty: 6 TABLET | Refills: 0 | Status: SHIPPED | OUTPATIENT
Start: 2021-12-25 | End: 2021-12-27

## 2021-12-25 NOTE — ED PROVIDER NOTES
[facility]                                                 I reviewed the ER triage nurse's note before evaluating the patient    Chief Complaint  51 y.o. female with Arm Swelling     History of Present Illness  Tracie Scott presents to the emergency room with right arm swelling today  Patient has warmth and irritation in the right antecubital area with cellulitis noted  Patient states she noticed 2 small bumps center antecubital area this past week  Patient states he antecubital area became indurated, medial redness on upper arm  Patient states she has a history of lymphadenectomy on that side, no fever noted now  Patient has no previous history of cellulitis, no fluctuance or abscess on evaluation     The history is provided by the patient  Previous medical records were obtained from [facility]  Previous records are summarized from prior ER visits and hospitalizations  Medical history significant for breast cancer  Surgical history significant for appendectomy, lumpectomy, cyst removal  Patient is allergic to iodine, alcohol, penicillin  No significant family history  No significant social history, nonsmoker    I have reviewed all of this patient's past medical, surgical, family, and social   histories as well as active allergies and medications documented in the  electronic medical record    Review of Systems and Physical Exam      Review of Systems (all other ROS are otherwise negative)  -- Constitution - no fever, denies fatigue, no weakness, no chills, night sweats  -- Eyes - no tearing or redness, no visual disturbance  -- Ear, Nose - no tinnitus or earache, no nasal congestion or discharge  -- Mouth,Throat - no sore throat, no toothache, normal voice, normal swallowing  -- Respiratory - denies cough and congestion, no shortness of breath, no AHUJA  -- Cardiovascular - denies chest pain, no palpitations, denies claudication  -- Gastrointestinal - denies abdominal pain, nausea, vomiting, or  diarrhea  -- Genitourinary - no dysuria, no hematuria, no flank pain, no bladder pain  -- Musculoskeletal - denies back pain, negative for trauma or injury  -- Neurological - no headache, no numbness, tingling, seizure, balance issues  -- Hematologic- no bruising, no bleeding, nose bleed, bleeding disorders  -- Lymphatics - no leg swelling, no tender nodes, no swollen nodes or LAD  -- Skin - right antecubital area swelling and redness, right medial arm redness    Vital Signs (reviewed by the physician)  Her temperature is 97.5 °F (36.4 °C).   Her blood pressure is 150/74 and her pulse is 81.   Her respiration is 16 and oxygen saturation is 100%.     Physical Exam  -- Nursing note and vitals reviewed  -- Constitutional: Appears well-developed and well-nourished  -- Head: Atraumatic. Normocephalic. No obvious abnormality  -- Eyes: Pupils are equal and reactive to light. Normal conjunctiva and lids  -- Cardiac: Normal rate, regular rhythm and normal heart sounds  -- Respiratory: Normal respiratory effort, breath sounds clear to auscultation  -- Gastrointestinal: Soft, no tenderness. Normal bowel sounds. Normal liver edge  -- Musculoskeletal: Normal range of motion, no effusions. Joints stable   -- Neurological: No focal deficits. Showed good interaction with staff  -- Vascular: Posterior tibial, dorsalis pedis and radial pulses 2+ bilaterally    -- Lymphatic/hematologic: No cervical or peripheral LAD. No edema noted  -- Integument: Right antecubital swelling/redness, right medial biceps redness    Emergency Room Course      Lab Results (reviewed by the physician)     K 3.7      CO2 27   BUN 15   CREATININE 0.9    (H)   ALKPHOS 72   AST 19   ALT 17   BILITOT 0.6   ALBUMIN 4.4   PROT 7.5   WBC 12.34   HGB 12.8   HCT 38.5      LACTATE 1.6     Additional Work up (reviewed by the physician)  -- Blood cultures have also been drawn, results are pending    Medications Given  clindamycin injection 600  mg (600 mg Intramuscular Given 12/24/21 2300)   morphine injection 2 mg (2 mg Intramuscular Given 12/24/21 2322)   ondansetron injection 4 mg (4 mg Intramuscular Given 12/24/21 2321)   mupirocin 2 % ointment 22 g (22 g Topical (Top) Given 12/24/21 2336)     Medical Decision Making     ED Course/ED Management  -- patient right antecubital redness and right medial upper arm redness x2 days  -- patient has a history of lymphadenectomy for breast cancer on that side  -- patient with negative lab work, normal white count, negative lactic acid today  -- patient with likely cellulitis due to increased risk from lymphadenectomy  -- clindamycin given IM, prescription given on ER discharge today  -- patient also counseled on wound care with Bactroban going forward on discharge  -- pain control with Motrin and Norco, prescriptions given on discharge today  -- patient carefully counseled return to the ER with worsening of symptoms/fever  -- patient voiced 100% understanding, feels much better on ER discharge    Assessment, Disposition, & Plan      Diagnosis  [L08.9] Skin infection (Primary)    Disposition and Plan  -- Disposition: home  -- Condition: stable  -- Follow-up: Patient to follow up with Primary Doctor in 1-2 days.  -- I advised the patient that we have found no life threatening condition today  -- At this time, I believe the patient is clinically stable for discharge.   -- Pt understands that the visit today is to address immediate concerns   -- Further workup and evaluation as an outpatient may be required  -- The patient acknowledges that close follow up with a MD is required   -- Patient agrees to comply with all instruction and direction given in the ER    This note is dictated on M*Modal word recognition program.  There are word recognition mistakes that are occasionally missed on review.         Kobi Nguyen MD  12/25/21 0049

## 2021-12-28 ENCOUNTER — OFFICE VISIT (OUTPATIENT)
Dept: FAMILY MEDICINE | Facility: CLINIC | Age: 51
End: 2021-12-28
Payer: COMMERCIAL

## 2021-12-28 VITALS
SYSTOLIC BLOOD PRESSURE: 132 MMHG | WEIGHT: 190.69 LBS | OXYGEN SATURATION: 98 % | DIASTOLIC BLOOD PRESSURE: 82 MMHG | RESPIRATION RATE: 20 BRPM | HEART RATE: 78 BPM | BODY MASS INDEX: 29.93 KG/M2 | HEIGHT: 67 IN

## 2021-12-28 DIAGNOSIS — L03.113 CELLULITIS OF ARM, RIGHT: Primary | ICD-10-CM

## 2021-12-28 DIAGNOSIS — I97.2 POSTMASTECTOMY LYMPHEDEMA SYNDROME OF RIGHT UPPER EXTREMITY: ICD-10-CM

## 2021-12-28 DIAGNOSIS — Z98.890 STATUS POST RIGHT BREAST LUMPECTOMY: ICD-10-CM

## 2021-12-28 PROBLEM — I89.0 LYMPHEDEMA OF RIGHT LOWER EXTREMITY: Status: ACTIVE | Noted: 2021-12-28

## 2021-12-28 PROCEDURE — 3079F PR MOST RECENT DIASTOLIC BLOOD PRESSURE 80-89 MM HG: ICD-10-PCS | Mod: CPTII,S$GLB,, | Performed by: FAMILY MEDICINE

## 2021-12-28 PROCEDURE — 3008F BODY MASS INDEX DOCD: CPT | Mod: CPTII,S$GLB,, | Performed by: FAMILY MEDICINE

## 2021-12-28 PROCEDURE — 3008F PR BODY MASS INDEX (BMI) DOCUMENTED: ICD-10-PCS | Mod: CPTII,S$GLB,, | Performed by: FAMILY MEDICINE

## 2021-12-28 PROCEDURE — 99213 OFFICE O/P EST LOW 20 MIN: CPT | Mod: S$GLB,,, | Performed by: FAMILY MEDICINE

## 2021-12-28 PROCEDURE — 1159F MED LIST DOCD IN RCRD: CPT | Mod: CPTII,S$GLB,, | Performed by: FAMILY MEDICINE

## 2021-12-28 PROCEDURE — 3075F SYST BP GE 130 - 139MM HG: CPT | Mod: CPTII,S$GLB,, | Performed by: FAMILY MEDICINE

## 2021-12-28 PROCEDURE — 99999 PR PBB SHADOW E&M-EST. PATIENT-LVL III: ICD-10-PCS | Mod: PBBFAC,,, | Performed by: FAMILY MEDICINE

## 2021-12-28 PROCEDURE — 99999 PR PBB SHADOW E&M-EST. PATIENT-LVL III: CPT | Mod: PBBFAC,,, | Performed by: FAMILY MEDICINE

## 2021-12-28 PROCEDURE — 3075F PR MOST RECENT SYSTOLIC BLOOD PRESS GE 130-139MM HG: ICD-10-PCS | Mod: CPTII,S$GLB,, | Performed by: FAMILY MEDICINE

## 2021-12-28 PROCEDURE — 1159F PR MEDICATION LIST DOCUMENTED IN MEDICAL RECORD: ICD-10-PCS | Mod: CPTII,S$GLB,, | Performed by: FAMILY MEDICINE

## 2021-12-28 PROCEDURE — 99213 PR OFFICE/OUTPT VISIT, EST, LEVL III, 20-29 MIN: ICD-10-PCS | Mod: S$GLB,,, | Performed by: FAMILY MEDICINE

## 2021-12-28 PROCEDURE — 3079F DIAST BP 80-89 MM HG: CPT | Mod: CPTII,S$GLB,, | Performed by: FAMILY MEDICINE

## 2021-12-28 RX ORDER — SULFAMETHOXAZOLE AND TRIMETHOPRIM 800; 160 MG/1; MG/1
1 TABLET ORAL 2 TIMES DAILY
COMMUNITY
End: 2022-03-30

## 2021-12-30 ENCOUNTER — TELEPHONE (OUTPATIENT)
Dept: FAMILY MEDICINE | Facility: CLINIC | Age: 51
End: 2021-12-30
Payer: COMMERCIAL

## 2021-12-30 LAB
BACTERIA BLD CULT: NORMAL
BACTERIA BLD CULT: NORMAL

## 2022-01-03 PROBLEM — Z98.890 STATUS POST RIGHT BREAST LUMPECTOMY: Status: ACTIVE | Noted: 2022-01-03

## 2022-01-03 PROBLEM — I97.2 POSTMASTECTOMY LYMPHEDEMA SYNDROME OF RIGHT UPPER EXTREMITY: Status: ACTIVE | Noted: 2022-01-03

## 2022-01-04 ENCOUNTER — OFFICE VISIT (OUTPATIENT)
Dept: FAMILY MEDICINE | Facility: CLINIC | Age: 52
End: 2022-01-04
Payer: COMMERCIAL

## 2022-01-04 VITALS
SYSTOLIC BLOOD PRESSURE: 118 MMHG | HEIGHT: 67 IN | DIASTOLIC BLOOD PRESSURE: 80 MMHG | HEART RATE: 64 BPM | BODY MASS INDEX: 29.54 KG/M2 | RESPIRATION RATE: 16 BRPM | WEIGHT: 188.19 LBS | TEMPERATURE: 99 F

## 2022-01-04 DIAGNOSIS — I97.2 POSTMASTECTOMY LYMPHEDEMA SYNDROME OF RIGHT UPPER EXTREMITY: Primary | ICD-10-CM

## 2022-01-04 DIAGNOSIS — L03.113 CELLULITIS OF ARM, RIGHT: ICD-10-CM

## 2022-01-04 PROBLEM — I89.0 LYMPHEDEMA OF RIGHT LOWER EXTREMITY: Status: RESOLVED | Noted: 2021-12-28 | Resolved: 2022-01-04

## 2022-01-04 PROCEDURE — 99213 OFFICE O/P EST LOW 20 MIN: CPT | Mod: S$GLB,,, | Performed by: FAMILY MEDICINE

## 2022-01-04 PROCEDURE — 3008F PR BODY MASS INDEX (BMI) DOCUMENTED: ICD-10-PCS | Mod: CPTII,S$GLB,, | Performed by: FAMILY MEDICINE

## 2022-01-04 PROCEDURE — 99213 PR OFFICE/OUTPT VISIT, EST, LEVL III, 20-29 MIN: ICD-10-PCS | Mod: S$GLB,,, | Performed by: FAMILY MEDICINE

## 2022-01-04 PROCEDURE — 99999 PR PBB SHADOW E&M-EST. PATIENT-LVL III: CPT | Mod: PBBFAC,,, | Performed by: FAMILY MEDICINE

## 2022-01-04 PROCEDURE — 1159F MED LIST DOCD IN RCRD: CPT | Mod: CPTII,S$GLB,, | Performed by: FAMILY MEDICINE

## 2022-01-04 PROCEDURE — 3074F SYST BP LT 130 MM HG: CPT | Mod: CPTII,S$GLB,, | Performed by: FAMILY MEDICINE

## 2022-01-04 PROCEDURE — 99999 PR PBB SHADOW E&M-EST. PATIENT-LVL III: ICD-10-PCS | Mod: PBBFAC,,, | Performed by: FAMILY MEDICINE

## 2022-01-04 PROCEDURE — 1159F PR MEDICATION LIST DOCUMENTED IN MEDICAL RECORD: ICD-10-PCS | Mod: CPTII,S$GLB,, | Performed by: FAMILY MEDICINE

## 2022-01-04 PROCEDURE — 3079F DIAST BP 80-89 MM HG: CPT | Mod: CPTII,S$GLB,, | Performed by: FAMILY MEDICINE

## 2022-01-04 PROCEDURE — 3079F PR MOST RECENT DIASTOLIC BLOOD PRESSURE 80-89 MM HG: ICD-10-PCS | Mod: CPTII,S$GLB,, | Performed by: FAMILY MEDICINE

## 2022-01-04 PROCEDURE — 3008F BODY MASS INDEX DOCD: CPT | Mod: CPTII,S$GLB,, | Performed by: FAMILY MEDICINE

## 2022-01-04 PROCEDURE — 3074F PR MOST RECENT SYSTOLIC BLOOD PRESSURE < 130 MM HG: ICD-10-PCS | Mod: CPTII,S$GLB,, | Performed by: FAMILY MEDICINE

## 2022-01-04 NOTE — PROGRESS NOTES
"Subjective:       Patient ID: Tracie Scott is a 51 y.o. female.    Chief Complaint: Follow-up (1 week follow up )    Pt is a 51 y.o. 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     Review of Systems   Constitutional: Negative for appetite change, chills and fever.   HENT: Negative for rhinorrhea, sinus pressure, sore throat and trouble swallowing.    Respiratory: Negative for cough, chest tightness, shortness of breath and wheezing.    Cardiovascular: Negative for chest pain and palpitations.   Gastrointestinal: Negative for abdominal pain, blood in stool, diarrhea, nausea and vomiting.   Genitourinary: Negative for dysuria, flank pain, hematuria, pelvic pain, urgency, vaginal bleeding, vaginal discharge and vaginal pain.   Musculoskeletal: Negative for back pain, joint swelling and neck stiffness.        RUE with less redness and less swelling humerus 12.5" forearm was 10.5"   Skin: Negative for rash.   Neurological: Negative for dizziness, weakness, light-headedness, numbness and headaches.   Hematological: Does not bruise/bleed easily.   Psychiatric/Behavioral: Negative for agitation. The patient is not nervous/anxious.        Objective:      Physical Exam  Constitutional:       Appearance: She is well-developed and well-nourished.   HENT:      Head: Normocephalic.   Eyes:      Pupils: Pupils are equal, round, and reactive to light.   Neck:      Thyroid: No thyromegaly.   Cardiovascular:      Rate and Rhythm: Normal rate and regular rhythm.   Pulmonary:      Effort: No respiratory distress.      Breath sounds: No wheezing or rales.   Chest:      Chest wall: No tenderness.   Abdominal:      General: There is no distension.      Tenderness: There is no abdominal tenderness. There is no guarding or rebound.   Musculoskeletal:         General: No tenderness or edema. Normal range of motion.     " " Cervical back: Normal range of motion and neck supple.      Comments: RUE was 12.5" and forearm 10.5" today with less redness, less swelling and pain is much less too   Lymphadenopathy:      Cervical: No cervical adenopathy.   Skin:     General: Skin is warm and dry.      Coloration: Skin is not pale.      Findings: No rash.      Comments: RUE with less redness and less swelling   Neurological:      Mental Status: She is alert and oriented to person, place, and time.      Cranial Nerves: No cranial nerve deficit.      Motor: No abnormal muscle tone.      Coordination: Coordination normal.      Deep Tendon Reflexes: Reflexes are normal and symmetric. Reflexes normal.   Psychiatric:         Mood and Affect: Mood and affect normal.         Thought Content: Thought content normal.         Judgment: Judgment normal.         Assessment:       1. 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 Cellulitis of arm, right        Plan:   Tracie was seen today for follow-up.    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

## 2022-01-05 ENCOUNTER — TELEPHONE (OUTPATIENT)
Dept: FAMILY MEDICINE | Facility: CLINIC | Age: 52
End: 2022-01-05
Payer: COMMERCIAL

## 2022-01-05 NOTE — TELEPHONE ENCOUNTER
Called pt to have paperwork refaxed so we can refill out and send it back.              ----- Message from Manuel Davis sent at 2022 12:10 PM CST -----  Contact: Aruna @ [employer]  Tracie Scott  MRN: [id number]  : 1970  PCP: Primary Doctor No  Home Phone      [phone]  Work Phone      Not on file.  Mobile          [phone]      MESSAGE: [employer] - received paperwork - office note must state that lymphedema has traveled to chest - patient has tried & failed 4 weeks of treatment (question # 2 on form) -- also, need to know size needed    PCP: Shashi

## 2022-01-06 ENCOUNTER — TELEPHONE (OUTPATIENT)
Dept: FAMILY MEDICINE | Facility: CLINIC | Age: 52
End: 2022-01-06
Payer: COMMERCIAL

## 2022-01-06 NOTE — TELEPHONE ENCOUNTER
----- Message from Pierce Cerna sent at 2022  1:18 PM CST -----  Contact: [facility]una/[employer]  Tracie Scott  MRN: [id number]  : 1970  PCP: Primary Doctor No  Home Phone      [phone]  Work Phone      Not on file.  Mobile          [phone]      MESSAGE:     Aruna with [employer] called and stated they are needing the pts progress notes to specifically stats that the lymphedema traveled to the pts chest area. That is the only way to get the arm sleeve covered by insurance.     Phone:[phone]  Fax:[phone]  Atten: Aruna

## 2022-01-06 NOTE — TELEPHONE ENCOUNTER
You will need to addend the last chart notes to say patient's lymphedema traveled to the patient's chest area. Only way to get sleeve covered by insurance.

## 2022-01-13 ENCOUNTER — TELEPHONE (OUTPATIENT)
Dept: FAMILY MEDICINE | Facility: CLINIC | Age: 52
End: 2022-01-13
Payer: COMMERCIAL

## 2022-01-13 NOTE — TELEPHONE ENCOUNTER
----- Message from Manuel Davis sent at 2022  9:52 AM CST -----  Contact: Patient  Tracie Scott  MRN: [id number]  : 1970  PCP: Primary Doctor No  Home Phone      [phone]  Work Phone      Not on file.  Mobile          [phone]      MESSAGE: fluid in arm has spread to her hand -- canshe re-start antibiotics    Call [phone]    PCP: Shashi

## 2022-01-31 ENCOUNTER — TELEPHONE (OUTPATIENT)
Dept: FAMILY MEDICINE | Facility: CLINIC | Age: 52
End: 2022-01-31
Payer: COMMERCIAL

## 2022-01-31 NOTE — TELEPHONE ENCOUNTER
----- Message from Katlin Russell sent at 1/28/2022  4:09 PM CST -----  Contact: self  Pt left message on VM looking to speak to Ale in regards to a machine that she was waiting on.    Phone:  [phone]

## 2022-01-31 NOTE — TELEPHONE ENCOUNTER
Spoke with pt--never heard anything back on sleeve from [employer]. Tried calling the company and they were closed. Looked back through the past msgs and found a msg from Aruna at [facility] that she needed an adended chart note which Dr Danielle did. Faxed that note to Aruna--waiting on a update. Patient states that her arm is worse.  Waiting to hear back from Aruna.

## 2022-02-17 ENCOUNTER — TELEPHONE (OUTPATIENT)
Dept: FAMILY MEDICINE | Facility: CLINIC | Age: 52
End: 2022-02-17
Payer: COMMERCIAL

## 2022-02-17 NOTE — TELEPHONE ENCOUNTER
----- Message from Katlin Russell sent at 2/17/2022  9:02 AM CST -----  Contact: [phone]  Aruna with [employer] called stating that ANY signs of improvement, the insurance will not approve it. Need 4 weeks of consecutive therapy and to state that tried and failed. Also need to state that lymphedema has traveled to chest area.  Aruna will be sending over forms to complete.     Phone:  [phone]

## 2022-02-17 NOTE — TELEPHONE ENCOUNTER
SPOKE TO [employer] ON YESTERDAY FAXED NEEDED DOCUMENTATION AND HAD MS QUEZADA COME TO THE CLINIC TO HAVE HER ARM MEASURED FROM MIDDLE FINGER TIP TO SHOULDER AND THEN RECEIVED A FAX ON TODAY STATED MORE DOCUMENTATION WAS NEEDED. RESENT NEW DOCUMENTATION.

## 2022-02-17 NOTE — TELEPHONE ENCOUNTER
----- Message from Manuel Davis sent at 2022 11:53 AM CST -----  Contact: [facility]  Tracie Scott  MRN: [id number]  : 1970  PCP: Primary Doctor Vashti  Home Phone      [phone]  Work Phone      Not on file.  Mobile          [phone]      MESSAGE: [facility] - still waiting on Addendum stating that lymphedema has moved to her chest    Call [phone]    PCP: Shashi

## 2022-02-22 ENCOUNTER — OFFICE VISIT (OUTPATIENT)
Dept: FAMILY MEDICINE | Facility: CLINIC | Age: 52
End: 2022-02-22
Payer: COMMERCIAL

## 2022-02-22 VITALS
OXYGEN SATURATION: 99 % | DIASTOLIC BLOOD PRESSURE: 90 MMHG | SYSTOLIC BLOOD PRESSURE: 138 MMHG | HEIGHT: 67 IN | BODY MASS INDEX: 29.79 KG/M2 | HEART RATE: 86 BPM | RESPIRATION RATE: 18 BRPM | WEIGHT: 189.81 LBS

## 2022-02-22 DIAGNOSIS — Z98.890 STATUS POST RIGHT BREAST LUMPECTOMY: ICD-10-CM

## 2022-02-22 DIAGNOSIS — L03.113 CELLULITIS OF ARM, RIGHT: ICD-10-CM

## 2022-02-22 DIAGNOSIS — I97.2 POSTMASTECTOMY LYMPHEDEMA SYNDROME OF RIGHT UPPER EXTREMITY: Primary | ICD-10-CM

## 2022-02-22 PROCEDURE — 3080F PR MOST RECENT DIASTOLIC BLOOD PRESSURE >= 90 MM HG: ICD-10-PCS | Mod: CPTII,S$GLB,, | Performed by: FAMILY MEDICINE

## 2022-02-22 PROCEDURE — 3075F SYST BP GE 130 - 139MM HG: CPT | Mod: CPTII,S$GLB,, | Performed by: FAMILY MEDICINE

## 2022-02-22 PROCEDURE — 1159F MED LIST DOCD IN RCRD: CPT | Mod: CPTII,S$GLB,, | Performed by: FAMILY MEDICINE

## 2022-02-22 PROCEDURE — 1159F PR MEDICATION LIST DOCUMENTED IN MEDICAL RECORD: ICD-10-PCS | Mod: CPTII,S$GLB,, | Performed by: FAMILY MEDICINE

## 2022-02-22 PROCEDURE — 3080F DIAST BP >= 90 MM HG: CPT | Mod: CPTII,S$GLB,, | Performed by: FAMILY MEDICINE

## 2022-02-22 PROCEDURE — 99213 PR OFFICE/OUTPT VISIT, EST, LEVL III, 20-29 MIN: ICD-10-PCS | Mod: S$GLB,,, | Performed by: FAMILY MEDICINE

## 2022-02-22 PROCEDURE — 99999 PR PBB SHADOW E&M-EST. PATIENT-LVL III: ICD-10-PCS | Mod: PBBFAC,,, | Performed by: FAMILY MEDICINE

## 2022-02-22 PROCEDURE — 99213 OFFICE O/P EST LOW 20 MIN: CPT | Mod: S$GLB,,, | Performed by: FAMILY MEDICINE

## 2022-02-22 PROCEDURE — 3008F BODY MASS INDEX DOCD: CPT | Mod: CPTII,S$GLB,, | Performed by: FAMILY MEDICINE

## 2022-02-22 PROCEDURE — 3075F PR MOST RECENT SYSTOLIC BLOOD PRESS GE 130-139MM HG: ICD-10-PCS | Mod: CPTII,S$GLB,, | Performed by: FAMILY MEDICINE

## 2022-02-22 PROCEDURE — 99999 PR PBB SHADOW E&M-EST. PATIENT-LVL III: CPT | Mod: PBBFAC,,, | Performed by: FAMILY MEDICINE

## 2022-02-22 PROCEDURE — 3008F PR BODY MASS INDEX (BMI) DOCUMENTED: ICD-10-PCS | Mod: CPTII,S$GLB,, | Performed by: FAMILY MEDICINE

## 2022-02-22 NOTE — PROGRESS NOTES
Subjective:       Patient ID: Tracie Scott is a 51 y.o. female.    Chief Complaint: No chief complaint on file.    Pt is a 51 y.o. female who presents for F U for RUE lymphedema from  previous breast malignancy surgery. Doing well on current meds. Denies any side effects.     Review of Systems   Constitutional: Negative for appetite change, chills and fever.   HENT: Negative for rhinorrhea, sinus pressure, sore throat and trouble swallowing.    Respiratory: Negative for cough, chest tightness, shortness of breath and wheezing.    Cardiovascular: Negative for chest pain and palpitations.   Gastrointestinal: Negative for abdominal pain, blood in stool, diarrhea, nausea and vomiting.   Genitourinary: Negative for dysuria, flank pain, hematuria, pelvic pain, urgency, vaginal bleeding, vaginal discharge and vaginal pain.   Musculoskeletal: Positive for arthralgias. Negative for back pain, joint swelling and neck stiffness.        Has tried conservative arm and hand elevation, but continues with swelling of her RUE into her shoulder area,and into her R scapula with swelling of her hand, forearm and upper arm into her shoulder.Pt states she can't use her R hand bec/ of swelling and pain, taking frequent from work bc/ of pain and having to elevate it   Skin: Negative for rash.   Neurological: Negative for dizziness, weakness, light-headedness, numbness and headaches.   Hematological: Does not bruise/bleed easily.   Psychiatric/Behavioral: Negative for agitation. The patient is not nervous/anxious.        Objective:      Physical Exam  Constitutional:       Appearance: She is well-developed.   HENT:      Head: Normocephalic.   Eyes:      Pupils: Pupils are equal, round, and reactive to light.   Neck:      Thyroid: No thyromegaly.   Cardiovascular:      Rate and Rhythm: Normal rate and regular rhythm.   Pulmonary:      Effort: No respiratory distress.      Breath sounds: No wheezing or rales.   Chest:      Chest wall:  No tenderness.   Abdominal:      General: There is no distension.      Tenderness: There is no abdominal tenderness. There is no guarding or rebound.   Musculoskeletal:         General: Swelling present. No tenderness. Normal range of motion.      Cervical back: Normal range of motion and neck supple.      Comments:  R humerus measures 12.5 inches; R forearm measures   10.5 inches; R hand shows some dorsal edema   Lymphadenopathy:      Cervical: No cervical adenopathy.   Skin:     General: Skin is warm and dry.      Coloration: Skin is not pale.      Findings: No rash.   Neurological:      Mental Status: She is alert and oriented to person, place, and time.      Cranial Nerves: No cranial nerve deficit.      Motor: No abnormal muscle tone.      Coordination: Coordination normal.      Deep Tendon Reflexes: Reflexes are normal and symmetric. Reflexes normal.   Psychiatric:         Thought Content: Thought content normal.         Judgment: Judgment normal.         Assessment:       1. 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 Cellulitis of arm, right    3. 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

## 2022-03-08 ENCOUNTER — TELEPHONE (OUTPATIENT)
Dept: FAMILY MEDICINE | Facility: CLINIC | Age: 52
End: 2022-03-08
Payer: COMMERCIAL

## 2022-03-08 NOTE — TELEPHONE ENCOUNTER
Aruna calling from [employer]--she is very irritated due to the fact she is stating that she never got the updated info for the pt's compression sleeve to be approved by the insurance.  Although I know that the info has been faxed to them several times. I did however fax to her pt's last ov 2/22/22. It may contain the info they need.  fx [phone]

## 2022-03-30 ENCOUNTER — OFFICE VISIT (OUTPATIENT)
Dept: OBSTETRICS AND GYNECOLOGY | Facility: CLINIC | Age: 52
End: 2022-03-30
Payer: COMMERCIAL

## 2022-03-30 VITALS
HEART RATE: 86 BPM | HEIGHT: 67 IN | DIASTOLIC BLOOD PRESSURE: 74 MMHG | WEIGHT: 198 LBS | BODY MASS INDEX: 31.08 KG/M2 | SYSTOLIC BLOOD PRESSURE: 122 MMHG | RESPIRATION RATE: 12 BRPM | OXYGEN SATURATION: 100 %

## 2022-03-30 DIAGNOSIS — R10.30 LOWER ABDOMINAL PAIN: Primary | ICD-10-CM

## 2022-03-30 DIAGNOSIS — N30.00 ACUTE CYSTITIS WITHOUT HEMATURIA: ICD-10-CM

## 2022-03-30 LAB
BILIRUB SERPL-MCNC: NEGATIVE MG/DL
BLOOD URINE, POC: NEGATIVE
CLARITY, POC UA: ABNORMAL
COLOR, POC UA: ABNORMAL
GLUCOSE UR QL STRIP: NEGATIVE
KETONES UR QL STRIP: NEGATIVE
LEUKOCYTE ESTERASE URINE, POC: NEGATIVE
NITRITE, POC UA: ABNORMAL
PH, POC UA: 6
PROTEIN, POC: NEGATIVE
SPECIFIC GRAVITY, POC UA: 1.01
UROBILINOGEN, POC UA: NEGATIVE

## 2022-03-30 PROCEDURE — 99999 PR PBB SHADOW E&M-EST. PATIENT-LVL III: CPT | Mod: PBBFAC,,, | Performed by: STUDENT IN AN ORGANIZED HEALTH CARE EDUCATION/TRAINING PROGRAM

## 2022-03-30 PROCEDURE — 87086 URINE CULTURE/COLONY COUNT: CPT | Performed by: STUDENT IN AN ORGANIZED HEALTH CARE EDUCATION/TRAINING PROGRAM

## 2022-03-30 PROCEDURE — 81002 URINALYSIS NONAUTO W/O SCOPE: CPT | Mod: S$GLB,,, | Performed by: STUDENT IN AN ORGANIZED HEALTH CARE EDUCATION/TRAINING PROGRAM

## 2022-03-30 PROCEDURE — 3078F DIAST BP <80 MM HG: CPT | Mod: CPTII,S$GLB,, | Performed by: STUDENT IN AN ORGANIZED HEALTH CARE EDUCATION/TRAINING PROGRAM

## 2022-03-30 PROCEDURE — 1159F PR MEDICATION LIST DOCUMENTED IN MEDICAL RECORD: ICD-10-PCS | Mod: CPTII,S$GLB,, | Performed by: STUDENT IN AN ORGANIZED HEALTH CARE EDUCATION/TRAINING PROGRAM

## 2022-03-30 PROCEDURE — 99212 OFFICE O/P EST SF 10 MIN: CPT | Mod: 25,S$GLB,, | Performed by: STUDENT IN AN ORGANIZED HEALTH CARE EDUCATION/TRAINING PROGRAM

## 2022-03-30 PROCEDURE — 99212 PR OFFICE/OUTPT VISIT, EST, LEVL II, 10-19 MIN: ICD-10-PCS | Mod: 25,S$GLB,, | Performed by: STUDENT IN AN ORGANIZED HEALTH CARE EDUCATION/TRAINING PROGRAM

## 2022-03-30 PROCEDURE — 1160F PR REVIEW ALL MEDS BY PRESCRIBER/CLIN PHARMACIST DOCUMENTED: ICD-10-PCS | Mod: CPTII,S$GLB,, | Performed by: STUDENT IN AN ORGANIZED HEALTH CARE EDUCATION/TRAINING PROGRAM

## 2022-03-30 PROCEDURE — 3078F PR MOST RECENT DIASTOLIC BLOOD PRESSURE < 80 MM HG: ICD-10-PCS | Mod: CPTII,S$GLB,, | Performed by: STUDENT IN AN ORGANIZED HEALTH CARE EDUCATION/TRAINING PROGRAM

## 2022-03-30 PROCEDURE — 3074F SYST BP LT 130 MM HG: CPT | Mod: CPTII,S$GLB,, | Performed by: STUDENT IN AN ORGANIZED HEALTH CARE EDUCATION/TRAINING PROGRAM

## 2022-03-30 PROCEDURE — 1160F RVW MEDS BY RX/DR IN RCRD: CPT | Mod: CPTII,S$GLB,, | Performed by: STUDENT IN AN ORGANIZED HEALTH CARE EDUCATION/TRAINING PROGRAM

## 2022-03-30 PROCEDURE — 3074F PR MOST RECENT SYSTOLIC BLOOD PRESSURE < 130 MM HG: ICD-10-PCS | Mod: CPTII,S$GLB,, | Performed by: STUDENT IN AN ORGANIZED HEALTH CARE EDUCATION/TRAINING PROGRAM

## 2022-03-30 PROCEDURE — 3008F BODY MASS INDEX DOCD: CPT | Mod: CPTII,S$GLB,, | Performed by: STUDENT IN AN ORGANIZED HEALTH CARE EDUCATION/TRAINING PROGRAM

## 2022-03-30 PROCEDURE — 99999 PR PBB SHADOW E&M-EST. PATIENT-LVL III: ICD-10-PCS | Mod: PBBFAC,,, | Performed by: STUDENT IN AN ORGANIZED HEALTH CARE EDUCATION/TRAINING PROGRAM

## 2022-03-30 PROCEDURE — 3008F PR BODY MASS INDEX (BMI) DOCUMENTED: ICD-10-PCS | Mod: CPTII,S$GLB,, | Performed by: STUDENT IN AN ORGANIZED HEALTH CARE EDUCATION/TRAINING PROGRAM

## 2022-03-30 PROCEDURE — 81002 POCT URINE DIPSTICK WITHOUT MICROSCOPE: ICD-10-PCS | Mod: S$GLB,,, | Performed by: STUDENT IN AN ORGANIZED HEALTH CARE EDUCATION/TRAINING PROGRAM

## 2022-03-30 PROCEDURE — 1159F MED LIST DOCD IN RCRD: CPT | Mod: CPTII,S$GLB,, | Performed by: STUDENT IN AN ORGANIZED HEALTH CARE EDUCATION/TRAINING PROGRAM

## 2022-03-30 RX ORDER — CYCLOBENZAPRINE HCL 5 MG
5 TABLET ORAL 3 TIMES DAILY PRN
COMMUNITY
End: 2023-01-24

## 2022-03-30 RX ORDER — NITROFURANTOIN 25; 75 MG/1; MG/1
100 CAPSULE ORAL 2 TIMES DAILY
Qty: 14 CAPSULE | Refills: 0 | Status: SHIPPED | OUTPATIENT
Start: 2022-03-30 | End: 2022-04-06

## 2022-03-30 NOTE — PROGRESS NOTES
"Subjective:       Patient ID: Tracie Scott is a 51 y.o. female.    Chief Complaint:  Pelvic Pain      History of Present Illness  Patient a a 52 yo  presenting with lower abdominal pain that she reports is on both sides in both the "front and back." She has tried ibuprofen with little relief. She reports that she has multiple bowel movements a day and this does not improve her symptoms.     She is also having concerns for a UTI. She reports that she gets them frequently.       Pelvic Pain  The patient's primary symptoms include pelvic pain. This is a recurrent problem. The current episode started 1 to 4 weeks ago. The problem occurs constantly. The problem has been unchanged. The pain is mild. The problem affects both sides. She is not pregnant. Associated symptoms include abdominal pain, back pain, frequency and urgency. Pertinent negatives include no dysuria or hematuria. The symptoms are aggravated by intercourse. She has tried NSAIDs for the symptoms. The treatment provided no relief. She is sexually active. No, her partner does not have an STD. She uses nothing for contraception. She is postmenopausal.         GYN & OB History  No LMP recorded. Patient is premenopausal.   Date of Last Pap: No result found    OB History    Para Term  AB Living   3 3       4   SAB IAB Ectopic Multiple Live Births         1        # Outcome Date GA Lbr Marvin/2nd Weight Sex Delivery Anes PTL Lv   3 Para            2A Para            2B Para            1 Para                Review of Systems  Review of Systems   Gastrointestinal: Positive for abdominal pain.   Genitourinary: Positive for frequency, pelvic pain and urgency. Negative for dysuria and hematuria.   Musculoskeletal: Positive for back pain.           Objective:    Physical Exam:   Constitutional: She is oriented to person, place, and time. She appears well-developed and well-nourished. No distress.    HENT:   Head: Normocephalic and atraumatic.  "   Eyes: Pupils are equal, round, and reactive to light. EOM are normal.     Cardiovascular: Normal rate.     Pulmonary/Chest: Effort normal.          Genitourinary:    Uterus normal.      Pelvic exam was performed with patient supine.   The external female genitalia was normal.   There is no rash, tenderness or lesion on the right labia. There is no rash, tenderness or lesion on the left labia. Right adnexum displays no mass, no tenderness and no fullness. Left adnexum displays no mass, no tenderness and no fullness. No erythema,  no vaginal discharge or tenderness in the vagina. Cervix exhibits friability. Cervix exhibits no discharge and no tenderness. Urethral Meatus exhibits: urethral lesionUrethra findings: no tendernessBladder findings: bladder distention and bladder tenderness            Musculoskeletal: Normal range of motion and moves all extremeties.       Neurological: She is alert and oriented to person, place, and time.    Skin: Skin is warm and dry. No erythema.    Psychiatric: She has a normal mood and affect. Her behavior is normal. Judgment and thought content normal.          Assessment:        1. Lower abdominal pain    2. Acute cystitis without hematuria               Plan:      Tracie was seen today for pelvic pain.    Diagnoses and all orders for this visit:    Lower abdominal pain  -     POCT urine dipstick without microscope  -     Urine culture    Acute cystitis without hematuria  -     nitrofurantoin, macrocrystal-monohydrate, (MACROBID) 100 MG capsule; Take 1 capsule (100 mg total) by mouth 2 (two) times daily. for 7 days          - discussed treating UTI prior to TVUS to see if abdominal pain resolved due to the majority of abdominal discomfort being suprapubic

## 2022-03-31 LAB
BACTERIA UR CULT: NORMAL
BACTERIA UR CULT: NORMAL

## 2022-05-06 ENCOUNTER — TELEPHONE (OUTPATIENT)
Dept: OBSTETRICS AND GYNECOLOGY | Facility: CLINIC | Age: 52
End: 2022-05-06
Payer: COMMERCIAL

## 2022-05-06 DIAGNOSIS — R10.2 PELVIC PAIN: Primary | ICD-10-CM

## 2022-05-06 NOTE — TELEPHONE ENCOUNTER
----- Message from Danielle Donovan MA sent at 5/6/2022 10:21 AM CDT -----  Contact: self  Tracie Scott  MRN: [id number]  Home Phone      [phone]  Work Phone      Not on file.  Mobile          [phone]    Patient Care Team:  Primary Doctor No as PCP - General  Ilan Becker MD as Obstetrician (Obstetrics)  OB? No  What phone number can you be reached at? [phone]  Message: Needs to speak to nurse regarding still having pain even after new medication that was given and after intercourse she is having a lot of bleeding.

## 2022-05-06 NOTE — TELEPHONE ENCOUNTER
Pt was called and she complained of still having pelvic pain. Pt was seen on 3/30/22 for issue was treated for UTI with macrobid and pain has not resolved per pt. Pt also complained of vaginal bleeding with intercourse the last two times. Pt denied dysuria. Pt requesting TV ultrasound that was discuss as an option last visit.

## 2022-05-06 NOTE — TELEPHONE ENCOUNTER
Pt was called and informed that ultrasound can be scheduled. Appt made for 5/11/22 @ 8:40. Pt voiced understanding.

## 2022-05-12 ENCOUNTER — APPOINTMENT (OUTPATIENT)
Dept: RADIOLOGY | Facility: CLINIC | Age: 52
End: 2022-05-12
Attending: STUDENT IN AN ORGANIZED HEALTH CARE EDUCATION/TRAINING PROGRAM
Payer: COMMERCIAL

## 2022-05-12 DIAGNOSIS — R10.2 PELVIC PAIN: ICD-10-CM

## 2022-05-12 PROCEDURE — 76830 TRANSVAGINAL US NON-OB: CPT | Mod: TC

## 2022-05-12 PROCEDURE — 76830 US PELVIS COMP WITH TRANSVAG NON-OB (XPD): ICD-10-PCS | Mod: 26,,, | Performed by: RADIOLOGY

## 2022-05-12 PROCEDURE — 76856 US EXAM PELVIC COMPLETE: CPT | Mod: 26,,, | Performed by: RADIOLOGY

## 2022-05-12 PROCEDURE — 76830 TRANSVAGINAL US NON-OB: CPT | Mod: 26,,, | Performed by: RADIOLOGY

## 2022-05-12 PROCEDURE — 76856 US PELVIS COMP WITH TRANSVAG NON-OB (XPD): ICD-10-PCS | Mod: 26,,, | Performed by: RADIOLOGY

## 2022-05-26 ENCOUNTER — PROCEDURE VISIT (OUTPATIENT)
Dept: OBSTETRICS AND GYNECOLOGY | Facility: CLINIC | Age: 52
End: 2022-05-26
Payer: COMMERCIAL

## 2022-05-26 VITALS
WEIGHT: 198 LBS | HEART RATE: 82 BPM | BODY MASS INDEX: 31.08 KG/M2 | RESPIRATION RATE: 12 BRPM | DIASTOLIC BLOOD PRESSURE: 76 MMHG | HEIGHT: 67 IN | OXYGEN SATURATION: 100 % | SYSTOLIC BLOOD PRESSURE: 122 MMHG

## 2022-05-26 DIAGNOSIS — R93.89 ABNORMAL TRANSVAGINAL ULTRASOUND: Primary | ICD-10-CM

## 2022-05-26 PROCEDURE — 88305 TISSUE EXAM BY PATHOLOGIST: CPT | Mod: 26,,, | Performed by: PATHOLOGY

## 2022-05-26 PROCEDURE — 99499 UNLISTED E&M SERVICE: CPT | Mod: S$GLB,,, | Performed by: STUDENT IN AN ORGANIZED HEALTH CARE EDUCATION/TRAINING PROGRAM

## 2022-05-26 PROCEDURE — 58100 BIOPSY OF UTERUS LINING: CPT | Mod: S$GLB,,, | Performed by: STUDENT IN AN ORGANIZED HEALTH CARE EDUCATION/TRAINING PROGRAM

## 2022-05-26 PROCEDURE — 99499 NO LOS: ICD-10-PCS | Mod: S$GLB,,, | Performed by: STUDENT IN AN ORGANIZED HEALTH CARE EDUCATION/TRAINING PROGRAM

## 2022-05-26 PROCEDURE — 88305 TISSUE EXAM BY PATHOLOGIST: ICD-10-PCS | Mod: 26,,, | Performed by: PATHOLOGY

## 2022-05-26 PROCEDURE — 88305 TISSUE EXAM BY PATHOLOGIST: CPT | Performed by: PATHOLOGY

## 2022-05-26 PROCEDURE — 58100 ENDOMETRIAL BIOPSY- TODAY: ICD-10-PCS | Mod: S$GLB,,, | Performed by: STUDENT IN AN ORGANIZED HEALTH CARE EDUCATION/TRAINING PROGRAM

## 2022-05-26 NOTE — PROCEDURES
Endometrial Biopsy- Today    Date/Time: 5/26/2022 3:45 PM  Performed by: Romy Moran MD  Authorized by: Romy Moran MD     Consent:     Consent obtained:  Written    Consent given by:  Patient    Patient questions answered: yes      Patient agrees, verbalizes understanding, and wants to proceed: yes      Educational handouts given: yes      Instructions and paperwork completed: yes    Indication:     Indications: Other disorder of menstruation and other abnormal bleeding from female genital tract    Pre-procedure:     Pre-procedure timeout performed: yes    Procedure:     Procedure: endometrial biopsy with Pipelle      Cervix cleaned and prepped: yes      Uterus sounded: yes      Uterus sound depth (cm):  11    Specimen collected: specimen collected and sent to pathology      Patient tolerated procedure well with no complications: yes

## 2022-05-30 LAB
FINAL PATHOLOGIC DIAGNOSIS: NORMAL
GROSS: NORMAL
Lab: NORMAL

## 2022-10-25 ENCOUNTER — TELEPHONE (OUTPATIENT)
Dept: FAMILY MEDICINE | Facility: CLINIC | Age: 52
End: 2022-10-25
Payer: COMMERCIAL

## 2022-10-25 NOTE — TELEPHONE ENCOUNTER
----- Message from Manuel Davis sent at 10/24/2022  9:40 AM CDT -----  Contact: Patient  Tracie Scott  MRN: [id number]  : 1970  PCP: Primary Doctor No  Home Phone      [phone]  Work Phone      Not on file.  [place]          [phone]      MESSAGE: left low back pain - blood in urine -- requesting appt today    Call [phone]    PCP:

## 2022-10-26 ENCOUNTER — PATIENT MESSAGE (OUTPATIENT)
Dept: ADMINISTRATIVE | Facility: HOSPITAL | Age: 52
End: 2022-10-26
Payer: COMMERCIAL

## 2022-10-26 DIAGNOSIS — Z12.11 COLON CANCER SCREENING: ICD-10-CM

## 2022-11-29 ENCOUNTER — PATIENT MESSAGE (OUTPATIENT)
Dept: INTERNAL MEDICINE | Facility: CLINIC | Age: 52
End: 2022-11-29
Payer: COMMERCIAL

## 2022-11-29 ENCOUNTER — HOSPITAL ENCOUNTER (OUTPATIENT)
Dept: RADIOLOGY | Facility: HOSPITAL | Age: 52
Discharge: HOME | End: 2022-11-29
Attending: NURSE PRACTITIONER
Payer: COMMERCIAL

## 2022-11-29 ENCOUNTER — OFFICE VISIT (OUTPATIENT)
Dept: INTERNAL MEDICINE | Facility: CLINIC | Age: 52
End: 2022-11-29
Payer: COMMERCIAL

## 2022-11-29 VITALS
SYSTOLIC BLOOD PRESSURE: 132 MMHG | OXYGEN SATURATION: 94 % | HEIGHT: 67 IN | BODY MASS INDEX: 30.76 KG/M2 | HEART RATE: 81 BPM | DIASTOLIC BLOOD PRESSURE: 82 MMHG | RESPIRATION RATE: 16 BRPM | WEIGHT: 196 LBS

## 2022-11-29 DIAGNOSIS — R07.81 RIB PAIN ON LEFT SIDE: Primary | ICD-10-CM

## 2022-11-29 DIAGNOSIS — R07.81 RIB PAIN ON LEFT SIDE: ICD-10-CM

## 2022-11-29 PROCEDURE — 3075F PR MOST RECENT SYSTOLIC BLOOD PRESS GE 130-139MM HG: ICD-10-PCS | Mod: CPTII,S$GLB,, | Performed by: NURSE PRACTITIONER

## 2022-11-29 PROCEDURE — 3008F BODY MASS INDEX DOCD: CPT | Mod: CPTII,S$GLB,, | Performed by: NURSE PRACTITIONER

## 2022-11-29 PROCEDURE — 71100 X-RAY EXAM RIBS UNI 2 VIEWS: CPT | Mod: 26,LT,, | Performed by: RADIOLOGY

## 2022-11-29 PROCEDURE — 96372 THER/PROPH/DIAG INJ SC/IM: CPT | Mod: S$GLB,,, | Performed by: NURSE PRACTITIONER

## 2022-11-29 PROCEDURE — 3075F SYST BP GE 130 - 139MM HG: CPT | Mod: CPTII,S$GLB,, | Performed by: NURSE PRACTITIONER

## 2022-11-29 PROCEDURE — 1160F RVW MEDS BY RX/DR IN RCRD: CPT | Mod: CPTII,S$GLB,, | Performed by: NURSE PRACTITIONER

## 2022-11-29 PROCEDURE — 1160F PR REVIEW ALL MEDS BY PRESCRIBER/CLIN PHARMACIST DOCUMENTED: ICD-10-PCS | Mod: CPTII,S$GLB,, | Performed by: NURSE PRACTITIONER

## 2022-11-29 PROCEDURE — 3008F PR BODY MASS INDEX (BMI) DOCUMENTED: ICD-10-PCS | Mod: CPTII,S$GLB,, | Performed by: NURSE PRACTITIONER

## 2022-11-29 PROCEDURE — 99999 PR PBB SHADOW E&M-EST. PATIENT-LVL IV: CPT | Mod: PBBFAC,,, | Performed by: NURSE PRACTITIONER

## 2022-11-29 PROCEDURE — 3079F PR MOST RECENT DIASTOLIC BLOOD PRESSURE 80-89 MM HG: ICD-10-PCS | Mod: CPTII,S$GLB,, | Performed by: NURSE PRACTITIONER

## 2022-11-29 PROCEDURE — 1159F PR MEDICATION LIST DOCUMENTED IN MEDICAL RECORD: ICD-10-PCS | Mod: CPTII,S$GLB,, | Performed by: NURSE PRACTITIONER

## 2022-11-29 PROCEDURE — 1159F MED LIST DOCD IN RCRD: CPT | Mod: CPTII,S$GLB,, | Performed by: NURSE PRACTITIONER

## 2022-11-29 PROCEDURE — 3079F DIAST BP 80-89 MM HG: CPT | Mod: CPTII,S$GLB,, | Performed by: NURSE PRACTITIONER

## 2022-11-29 PROCEDURE — 99213 PR OFFICE/OUTPT VISIT, EST, LEVL III, 20-29 MIN: ICD-10-PCS | Mod: 25,S$GLB,, | Performed by: NURSE PRACTITIONER

## 2022-11-29 PROCEDURE — 71100 XR RIBS 2 VIEW LEFT: ICD-10-PCS | Mod: 26,LT,, | Performed by: RADIOLOGY

## 2022-11-29 PROCEDURE — 99213 OFFICE O/P EST LOW 20 MIN: CPT | Mod: 25,S$GLB,, | Performed by: NURSE PRACTITIONER

## 2022-11-29 PROCEDURE — 99999 PR PBB SHADOW E&M-EST. PATIENT-LVL IV: ICD-10-PCS | Mod: PBBFAC,,, | Performed by: NURSE PRACTITIONER

## 2022-11-29 PROCEDURE — 71100 X-RAY EXAM RIBS UNI 2 VIEWS: CPT | Mod: TC,LT

## 2022-11-29 PROCEDURE — 96372 PR INJECTION,THERAP/PROPH/DIAG2ST, IM OR SUBCUT: ICD-10-PCS | Mod: S$GLB,,, | Performed by: NURSE PRACTITIONER

## 2022-11-29 RX ORDER — KETOROLAC TROMETHAMINE 30 MG/ML
60 INJECTION, SOLUTION INTRAMUSCULAR; INTRAVENOUS
Status: COMPLETED | OUTPATIENT
Start: 2022-11-29 | End: 2022-11-29

## 2022-11-29 RX ORDER — IBUPROFEN 800 MG/1
800 TABLET ORAL 3 TIMES DAILY PRN
Qty: 90 TABLET | Refills: 0 | Status: SHIPPED | OUTPATIENT
Start: 2022-11-29 | End: 2023-01-24

## 2022-11-29 RX ORDER — CYCLOBENZAPRINE HCL 5 MG
5 TABLET ORAL 3 TIMES DAILY PRN
Qty: 30 TABLET | Refills: 0 | Status: SHIPPED | OUTPATIENT
Start: 2022-11-29 | End: 2022-12-09

## 2022-11-29 RX ADMIN — KETOROLAC TROMETHAMINE 60 MG: 30 INJECTION, SOLUTION INTRAMUSCULAR; INTRAVENOUS at 01:11

## 2022-11-29 NOTE — PROGRESS NOTES
Subjective:       Patient ID: Tracie Scott is a 52 y.o. female.    Chief Complaint: Chest Pain (X 1 week)    Patient is known, to me and presents with   Chief Complaint   Patient presents with    Chest Pain     X 1 week   .  Denies chest pain and shortness of breath.  Patient presents with left lower rib cage pain from coughing secondary to bronchitis. The cough has subsided but now with pain. She is concerned that she fractured a rib. Hurts to take a deep breath and sneeze. Ibuprofen does help otc. No rashes noted  Chest Pain   Pertinent negatives include no cough or shortness of breath.   Review of Systems   Constitutional: Negative.    Respiratory: Negative.  Negative for apnea, cough, choking, chest tightness, shortness of breath, wheezing and stridor.    Cardiovascular:  Positive for chest pain.   Musculoskeletal: Negative.    Skin: Negative.      Objective:      Physical Exam  Constitutional:       General: She is not in acute distress.     Appearance: Normal appearance. She is obese. She is not ill-appearing, toxic-appearing or diaphoretic.   Cardiovascular:      Rate and Rhythm: Normal rate and regular rhythm.      Heart sounds: Normal heart sounds. No murmur heard.    No friction rub. No gallop.   Pulmonary:      Effort: Pulmonary effort is normal. No respiratory distress.      Breath sounds: No stridor. No wheezing, rhonchi or rales.      Comments: No friction rub.   Chest:      Chest wall: Tenderness present.       Skin:     General: Skin is warm and dry.      Capillary Refill: Capillary refill takes less than 2 seconds.      Coloration: Skin is not jaundiced or pale.      Findings: No bruising, erythema, lesion or rash.   Neurological:      Mental Status: She is alert.       Assessment:       1. Rib pain on left side          Plan:   Tracie was seen today for chest pain.    Diagnoses and all orders for this visit:    Rib pain on left side  -     X-Ray Ribs 2 View Left; Future  -     ketorolac  "injection 60 mg  -     ibuprofen (ADVIL,MOTRIN) 800 MG tablet; Take 1 tablet (800 mg total) by mouth 3 (three) times daily as needed.  -     cyclobenzaprine (FLEXERIL) 5 MG tablet; Take 1 tablet (5 mg total) by mouth 3 (three) times daily as needed for Muscle spasms.  "This note will not be shared with the patient."    Will call with results  For now make sure to take deep breaths to prevent pneumonia   More likely just strained area  Rtc as scheduled     "

## 2023-01-24 ENCOUNTER — OFFICE VISIT (OUTPATIENT)
Dept: FAMILY MEDICINE | Facility: CLINIC | Age: 53
End: 2023-01-24
Payer: COMMERCIAL

## 2023-01-24 VITALS
OXYGEN SATURATION: 99 % | HEIGHT: 67 IN | BODY MASS INDEX: 29.67 KG/M2 | HEART RATE: 83 BPM | SYSTOLIC BLOOD PRESSURE: 120 MMHG | DIASTOLIC BLOOD PRESSURE: 82 MMHG | WEIGHT: 189.06 LBS | RESPIRATION RATE: 18 BRPM

## 2023-01-24 DIAGNOSIS — G62.0 DRUG-INDUCED POLYNEUROPATHY: Primary | ICD-10-CM

## 2023-01-24 DIAGNOSIS — R19.7 BLOODY DIARRHEA: ICD-10-CM

## 2023-01-24 DIAGNOSIS — C50.919 MALIGNANT NEOPLASM OF FEMALE BREAST, UNSPECIFIED ESTROGEN RECEPTOR STATUS, UNSPECIFIED LATERALITY, UNSPECIFIED SITE OF BREAST: ICD-10-CM

## 2023-01-24 PROCEDURE — 99999 PR PBB SHADOW E&M-EST. PATIENT-LVL III: CPT | Mod: PBBFAC,,, | Performed by: FAMILY MEDICINE

## 2023-01-24 PROCEDURE — 3074F SYST BP LT 130 MM HG: CPT | Mod: CPTII,S$GLB,, | Performed by: FAMILY MEDICINE

## 2023-01-24 PROCEDURE — 99999 PR PBB SHADOW E&M-EST. PATIENT-LVL III: ICD-10-PCS | Mod: PBBFAC,,, | Performed by: FAMILY MEDICINE

## 2023-01-24 PROCEDURE — 3074F PR MOST RECENT SYSTOLIC BLOOD PRESSURE < 130 MM HG: ICD-10-PCS | Mod: CPTII,S$GLB,, | Performed by: FAMILY MEDICINE

## 2023-01-24 PROCEDURE — 3008F PR BODY MASS INDEX (BMI) DOCUMENTED: ICD-10-PCS | Mod: CPTII,S$GLB,, | Performed by: FAMILY MEDICINE

## 2023-01-24 PROCEDURE — 99213 PR OFFICE/OUTPT VISIT, EST, LEVL III, 20-29 MIN: ICD-10-PCS | Mod: S$GLB,,, | Performed by: FAMILY MEDICINE

## 2023-01-24 PROCEDURE — 3079F PR MOST RECENT DIASTOLIC BLOOD PRESSURE 80-89 MM HG: ICD-10-PCS | Mod: CPTII,S$GLB,, | Performed by: FAMILY MEDICINE

## 2023-01-24 PROCEDURE — 1159F PR MEDICATION LIST DOCUMENTED IN MEDICAL RECORD: ICD-10-PCS | Mod: CPTII,S$GLB,, | Performed by: FAMILY MEDICINE

## 2023-01-24 PROCEDURE — 3079F DIAST BP 80-89 MM HG: CPT | Mod: CPTII,S$GLB,, | Performed by: FAMILY MEDICINE

## 2023-01-24 PROCEDURE — 1159F MED LIST DOCD IN RCRD: CPT | Mod: CPTII,S$GLB,, | Performed by: FAMILY MEDICINE

## 2023-01-24 PROCEDURE — 99213 OFFICE O/P EST LOW 20 MIN: CPT | Mod: S$GLB,,, | Performed by: FAMILY MEDICINE

## 2023-01-24 PROCEDURE — 3008F BODY MASS INDEX DOCD: CPT | Mod: CPTII,S$GLB,, | Performed by: FAMILY MEDICINE

## 2023-01-24 NOTE — PROGRESS NOTES
Subjective:       Patient ID: Tracie Scott is a 52 y.o. female.    Chief Complaint: Diarrhea and Rectal Bleeding (Bright red blood)    Pt is a 52 y.o. female who presents for bloody diarrhea since the last the 10 hrs with a BM every 20 minutes with cramps    Review of Systems   Gastrointestinal:  Positive for abdominal pain and blood in stool.     Objective:      Physical Exam  Constitutional:       Appearance: She is well-developed. She is ill-appearing.   HENT:      Head: Normocephalic.   Eyes:      Pupils: Pupils are equal, round, and reactive to light.   Neck:      Thyroid: No thyromegaly.   Cardiovascular:      Rate and Rhythm: Normal rate and regular rhythm.   Pulmonary:      Effort: No respiratory distress.      Breath sounds: No wheezing or rales.   Chest:      Chest wall: No tenderness.   Abdominal:      General: There is no distension.      Tenderness: There is no abdominal tenderness. There is no guarding or rebound.   Musculoskeletal:         General: No tenderness. Normal range of motion.      Cervical back: Normal range of motion and neck supple.   Lymphadenopathy:      Cervical: No cervical adenopathy.   Skin:     General: Skin is warm and dry.      Coloration: Skin is not pale.      Findings: No rash.   Neurological:      Mental Status: She is alert and oriented to person, place, and time.      Cranial Nerves: No cranial nerve deficit.      Motor: No abnormal muscle tone.      Coordination: Coordination normal.      Deep Tendon Reflexes: Reflexes are normal and symmetric. Reflexes normal.   Psychiatric:         Thought Content: Thought content normal.         Judgment: Judgment normal.       Assessment:       Encounter Diagnoses   Name Primary?    Drug-induced polyneuropathy Yes    Malignant neoplasm of female breast, unspecified estrogen receptor status, unspecified laterality, unspecified site of breast     Bloody diarrhea          Plan:   1. Drug-induced polyneuropathy    2. Malignant  neoplasm of female breast, unspecified estrogen receptor status, unspecified laterality, unspecified site of breast    3. Bloody diarrhea     Recommend to the ER at [facility]

## 2023-01-26 ENCOUNTER — TELEPHONE (OUTPATIENT)
Dept: FAMILY MEDICINE | Facility: CLINIC | Age: 53
End: 2023-01-26
Payer: COMMERCIAL

## 2023-01-26 NOTE — TELEPHONE ENCOUNTER
PT WAS SEEN AT [facility] AND THE PROGRESS NOTES FROM THE VISIT HAS TO BE FAXED OVER                      ----- Message from Manuel Davis sent at 2023 11:15 AM CST -----  Contact: Patient  Tracie Scott  MRN: [id number]  : 1970  PCP: Demario Danielle  Home Phone      [phone]  Work Phone      Not on file.  Mobile          [phone]      MESSAGE: sent to ER yesterday - would like test results -- please advise    Call [phone]    PCP: Shashi

## 2023-01-27 ENCOUNTER — TELEPHONE (OUTPATIENT)
Dept: FAMILY MEDICINE | Facility: CLINIC | Age: 53
End: 2023-01-27
Payer: COMMERCIAL

## 2023-01-27 NOTE — TELEPHONE ENCOUNTER
SPOKE TO PT WE RECEIVED THE RECORDS DR BAY WILL LOOK THEM OVER ON Monday AND SHE WILL GET A CALL BACK AFTER.          ----- Message from Katlin Russell sent at 2023 10:30 AM CST -----  Contact: self  Tracie Scott  MRN: [id number]  : 1970  PCP: Demario Bay  Home Phone      [phone]  Work Phone      Not on file.  Mobile          [phone]      MESSAGE:   Pt called checking if culture results came in yet. Also, Pt wanted to let provider know that bleeding got thicker and states it stopped yesterday before lunch. Pt also states she hadn't had bowel movement since Monday.     Phone:  [phone]

## 2023-01-31 ENCOUNTER — TELEPHONE (OUTPATIENT)
Dept: FAMILY MEDICINE | Facility: CLINIC | Age: 53
End: 2023-01-31
Payer: COMMERCIAL

## 2023-01-31 DIAGNOSIS — K51.811 OTHER ULCERATIVE COLITIS WITH RECTAL BLEEDING: Primary | ICD-10-CM

## 2023-02-02 ENCOUNTER — TELEPHONE (OUTPATIENT)
Dept: FAMILY MEDICINE | Facility: CLINIC | Age: 53
End: 2023-02-02
Payer: COMMERCIAL

## 2023-02-02 NOTE — TELEPHONE ENCOUNTER
OFFICE NOTES FAXED NO LABS DONE TO BE FAXED        ----- Message from Katlin Russell sent at 2023 11:04 AM CST -----  Contact: Liana/Dr Hobbs  Tracie Scott  MRN: [id number]  : 1970  PCP: Demario Danielle  Home Phone      [phone]  Work Phone      Not on file.  Mobile          [phone]      MESSAGE:  Liana with Dr Hobbs' office called stating they will need chart notes and labs sent to their office for pt's appt.     Fax: [phone]

## 2023-03-08 ENCOUNTER — PATIENT OUTREACH (OUTPATIENT)
Dept: ADMINISTRATIVE | Facility: HOSPITAL | Age: 53
End: 2023-03-08
Payer: COMMERCIAL

## 2023-03-08 DIAGNOSIS — Z13.6 ENCOUNTER FOR SCREENING FOR CARDIOVASCULAR DISORDERS: Primary | ICD-10-CM

## 2023-03-08 DIAGNOSIS — Z13.6 ENCOUNTER FOR LIPID SCREENING FOR CARDIOVASCULAR DISEASE: Primary | ICD-10-CM

## 2023-03-08 DIAGNOSIS — Z13.220 ENCOUNTER FOR LIPID SCREENING FOR CARDIOVASCULAR DISEASE: Primary | ICD-10-CM

## 2023-03-08 DIAGNOSIS — I70.90 ATHEROSCLEROSIS: ICD-10-CM

## 2023-03-08 DIAGNOSIS — Z13.1 SCREENING FOR DIABETES MELLITUS (DM): ICD-10-CM

## 2023-03-08 RX ORDER — ONDANSETRON 4 MG/1
4 TABLET, ORALLY DISINTEGRATING ORAL EVERY 8 HOURS PRN
COMMUNITY
Start: 2023-01-24 | End: 2023-03-20

## 2023-03-08 RX ORDER — ESOMEPRAZOLE MAGNESIUM 40 MG/1
40 CAPSULE, DELAYED RELEASE ORAL EVERY MORNING
COMMUNITY
Start: 2023-02-28 | End: 2023-03-20

## 2023-03-08 RX ORDER — SODIUM PICOSULFATE, MAGNESIUM OXIDE, AND ANHYDROUS CITRIC ACID 10; 3.5; 12 MG/160ML; G/160ML; G/160ML
LIQUID ORAL
COMMUNITY
Start: 2023-03-02 | End: 2023-03-20

## 2023-03-20 ENCOUNTER — OFFICE VISIT (OUTPATIENT)
Dept: OBSTETRICS AND GYNECOLOGY | Facility: CLINIC | Age: 53
End: 2023-03-20
Payer: COMMERCIAL

## 2023-03-20 VITALS
DIASTOLIC BLOOD PRESSURE: 84 MMHG | WEIGHT: 196.13 LBS | HEIGHT: 67 IN | OXYGEN SATURATION: 96 % | HEART RATE: 82 BPM | RESPIRATION RATE: 18 BRPM | SYSTOLIC BLOOD PRESSURE: 122 MMHG | BODY MASS INDEX: 30.78 KG/M2

## 2023-03-20 DIAGNOSIS — Z01.419 ENCNTR FOR GYN EXAM (GENERAL) (ROUTINE) W/O ABN FINDINGS: Primary | ICD-10-CM

## 2023-03-20 DIAGNOSIS — Z12.4 CERVICAL CANCER SCREENING: ICD-10-CM

## 2023-03-20 PROCEDURE — 87624 HPV HI-RISK TYP POOLED RSLT: CPT | Performed by: STUDENT IN AN ORGANIZED HEALTH CARE EDUCATION/TRAINING PROGRAM

## 2023-03-20 PROCEDURE — 99999 PR PBB SHADOW E&M-EST. PATIENT-LVL III: CPT | Mod: PBBFAC,,, | Performed by: STUDENT IN AN ORGANIZED HEALTH CARE EDUCATION/TRAINING PROGRAM

## 2023-03-20 PROCEDURE — 3079F DIAST BP 80-89 MM HG: CPT | Mod: CPTII,S$GLB,, | Performed by: STUDENT IN AN ORGANIZED HEALTH CARE EDUCATION/TRAINING PROGRAM

## 2023-03-20 PROCEDURE — 1160F PR REVIEW ALL MEDS BY PRESCRIBER/CLIN PHARMACIST DOCUMENTED: ICD-10-PCS | Mod: CPTII,S$GLB,, | Performed by: STUDENT IN AN ORGANIZED HEALTH CARE EDUCATION/TRAINING PROGRAM

## 2023-03-20 PROCEDURE — 3008F PR BODY MASS INDEX (BMI) DOCUMENTED: ICD-10-PCS | Mod: CPTII,S$GLB,, | Performed by: STUDENT IN AN ORGANIZED HEALTH CARE EDUCATION/TRAINING PROGRAM

## 2023-03-20 PROCEDURE — 3079F PR MOST RECENT DIASTOLIC BLOOD PRESSURE 80-89 MM HG: ICD-10-PCS | Mod: CPTII,S$GLB,, | Performed by: STUDENT IN AN ORGANIZED HEALTH CARE EDUCATION/TRAINING PROGRAM

## 2023-03-20 PROCEDURE — 99396 PR PREVENTIVE VISIT,EST,40-64: ICD-10-PCS | Mod: S$GLB,,, | Performed by: STUDENT IN AN ORGANIZED HEALTH CARE EDUCATION/TRAINING PROGRAM

## 2023-03-20 PROCEDURE — 3008F BODY MASS INDEX DOCD: CPT | Mod: CPTII,S$GLB,, | Performed by: STUDENT IN AN ORGANIZED HEALTH CARE EDUCATION/TRAINING PROGRAM

## 2023-03-20 PROCEDURE — 1160F RVW MEDS BY RX/DR IN RCRD: CPT | Mod: CPTII,S$GLB,, | Performed by: STUDENT IN AN ORGANIZED HEALTH CARE EDUCATION/TRAINING PROGRAM

## 2023-03-20 PROCEDURE — 99999 PR PBB SHADOW E&M-EST. PATIENT-LVL III: ICD-10-PCS | Mod: PBBFAC,,, | Performed by: STUDENT IN AN ORGANIZED HEALTH CARE EDUCATION/TRAINING PROGRAM

## 2023-03-20 PROCEDURE — 1159F MED LIST DOCD IN RCRD: CPT | Mod: CPTII,S$GLB,, | Performed by: STUDENT IN AN ORGANIZED HEALTH CARE EDUCATION/TRAINING PROGRAM

## 2023-03-20 PROCEDURE — 3074F SYST BP LT 130 MM HG: CPT | Mod: CPTII,S$GLB,, | Performed by: STUDENT IN AN ORGANIZED HEALTH CARE EDUCATION/TRAINING PROGRAM

## 2023-03-20 PROCEDURE — 3074F PR MOST RECENT SYSTOLIC BLOOD PRESSURE < 130 MM HG: ICD-10-PCS | Mod: CPTII,S$GLB,, | Performed by: STUDENT IN AN ORGANIZED HEALTH CARE EDUCATION/TRAINING PROGRAM

## 2023-03-20 PROCEDURE — 99396 PREV VISIT EST AGE 40-64: CPT | Mod: S$GLB,,, | Performed by: STUDENT IN AN ORGANIZED HEALTH CARE EDUCATION/TRAINING PROGRAM

## 2023-03-20 PROCEDURE — 88175 CYTOPATH C/V AUTO FLUID REDO: CPT | Performed by: STUDENT IN AN ORGANIZED HEALTH CARE EDUCATION/TRAINING PROGRAM

## 2023-03-20 PROCEDURE — 1159F PR MEDICATION LIST DOCUMENTED IN MEDICAL RECORD: ICD-10-PCS | Mod: CPTII,S$GLB,, | Performed by: STUDENT IN AN ORGANIZED HEALTH CARE EDUCATION/TRAINING PROGRAM

## 2023-03-20 NOTE — PROGRESS NOTES
Subjective:    Patient ID: Tracie Scott is a 52 y.o. y.o. female.     Chief Complaint: Annual Well Woman Exam     History of Present Illness:  Tracie presents today for Annual Well Woman exam. She describes her menses as  absent .She denies pelvic pain.  She denies breast tenderness, masses, nipple discharge. She denies difficulty with urination or bowel movements. She admits to menopausal symptoms such as hotflashes, vaginal dryness, and night sweats. She denies bloating, early satiety, or weight changes. She is sexually active.     Menstrual History:   No LMP recorded (lmp unknown). Patient is premenopausal..     OB History          3    Para   3    Term                AB        Living   4         SAB        IAB        Ectopic        Multiple   1    Live Births                     The following portions of the patient's history were reviewed and updated as appropriate: allergies, current medications, past family history, past medical history, past social history, past surgical history, and problem list.    ROS:   CONSTITUTIONAL: Negative for fever, chills, diaphoresis, weakness, fatigue, weight loss, weight gain  ENT: negative for sore throat, nasal congestion, nasal discharge, epistaxis, tinnitus, hearing loss  EYES: negative for blurry vision, decreased vision, loss of vision, eye pain, diplopia, photophobia, discharge  SKIN: Negative for rash, itching, hives  RESPIRATORY: negative for cough, hemoptysis, shortness of breath, pleuritic chest pain, wheezing  CARDIOVASCULAR: negative for chest pain, dyspnea on exertion, orthopnea, paroxysmal nocturnal dyspnea, edema, palpitations  BREAST: negative for breast  tenderness, breast mass, nipple discharge, or skin changes  GASTROINTESTINAL: negative for abdominal pain, flank pain, nausea, vomiting, diarrhea, constipation, black stool, blood in stool  GENITOURINARY: negative for abnormal vaginal bleeding, amenorrhea, decreased libido, dysuria,  genital sores, hematuria, incontinence, menorrhagia, pelvic pain, urinary frequency, vaginal discharge  HEMATOLOGIC/LYMPHATIC: negative for swollen lymph nodes, bleeding, bruising  MUSCULOSKELETAL: negative for back pain, joint pain, joint stiffness, joint swelling, muscle pain, muscle weakness  NEUROLOGICAL: negative for dizzy/vertigo, headache, focal weakness, numbness/tingling, speech problems, loss of consciousness, confusion, memory loss  BEHAVORIAL/PSYCH: negative for anxiety, depression, psychosis  ENDOCRINE: negative for polydipsia/polyuria, palpitations, skin changes, temperature intolerance, unexpected weight changes  ALLERGIC/IMMUNOLOGIC: negative for urticaria, hay fever, angioedema      Objective:    Vital Signs:  Vitals:    03/20/23 1634   BP: 122/84   Pulse: 82   Resp: 18       Physical Exam:  General:  alert, cooperative, no distress   Skin:  Skin color, texture, turgor normal. No rashes or lesions   HEENT:  conjunctivae/corneas clear. PERRL.   Neck: supple, trachea midline, no adenopathy or thyromegally   Respiratory:  Normal effort   Breasts:  no discharge, erythema, tenderness, or palpable masses; no axillary lymphadenopathy   Abdomen:  soft, nontender, no palpable masses   Pelvis: External genitalia: normal general appearance  Urinary system: urethral meatus normal, bladder nontender  Vaginal: normal mucosa without prolapse or lesions  Cervix: normal appearance  Uterus: normal size, shape, position  Adnexa: normal size, nontender bilaterally   Extremities: Normal ROM; no edema, no cyanosis   Neurologial: Normal strength and tone. No focal numbness or weakness.   Psychiatric: normal mood, speech, dress, and thought processes         Assessment:       Healthy female exam.     1. Encntr for gyn exam (general) (routine) w/o abn findings    2. Cervical cancer screening          Plan:      Problem List Items Addressed This Visit    None  Visit Diagnoses       Encntr for gyn exam (general) (routine) w/o  abn findings    -  Primary    Cervical cancer screening        Relevant Orders    Liquid-Based Pap Smear, Screening    HPV High Risk Genotypes, PCR              COUNSELING:  Tracie was counseled on A.C.O.G. Pap guidelines and recommendations for yearly pelvic exams in addition to recommendations for monthly self breast exams; to see her PCP for other health maintenance.

## 2023-03-27 LAB
FINAL PATHOLOGIC DIAGNOSIS: NORMAL
HPV HR 12 DNA SPEC QL NAA+PROBE: NEGATIVE
HPV16 AG SPEC QL: NEGATIVE
HPV18 DNA SPEC QL NAA+PROBE: NEGATIVE
Lab: NORMAL

## 2023-05-03 ENCOUNTER — PATIENT MESSAGE (OUTPATIENT)
Dept: ADMINISTRATIVE | Facility: HOSPITAL | Age: 53
End: 2023-05-03
Payer: COMMERCIAL

## 2023-06-26 ENCOUNTER — OFFICE VISIT (OUTPATIENT)
Dept: INTERNAL MEDICINE | Facility: CLINIC | Age: 53
End: 2023-06-26
Payer: COMMERCIAL

## 2023-06-26 ENCOUNTER — HOSPITAL ENCOUNTER (OUTPATIENT)
Dept: RADIOLOGY | Facility: HOSPITAL | Age: 53
Discharge: HOME | End: 2023-06-26
Attending: NURSE PRACTITIONER
Payer: COMMERCIAL

## 2023-06-26 VITALS
WEIGHT: 198.63 LBS | DIASTOLIC BLOOD PRESSURE: 76 MMHG | HEIGHT: 67 IN | SYSTOLIC BLOOD PRESSURE: 122 MMHG | RESPIRATION RATE: 18 BRPM | OXYGEN SATURATION: 99 % | BODY MASS INDEX: 31.18 KG/M2 | HEART RATE: 82 BPM

## 2023-06-26 DIAGNOSIS — M54.6 ACUTE RIGHT-SIDED THORACIC BACK PAIN: ICD-10-CM

## 2023-06-26 DIAGNOSIS — C50.911 MALIGNANT NEOPLASM OF RIGHT BREAST IN FEMALE, ESTROGEN RECEPTOR POSITIVE, UNSPECIFIED SITE OF BREAST: Primary | ICD-10-CM

## 2023-06-26 DIAGNOSIS — Z17.0 MALIGNANT NEOPLASM OF RIGHT BREAST IN FEMALE, ESTROGEN RECEPTOR POSITIVE, UNSPECIFIED SITE OF BREAST: Primary | ICD-10-CM

## 2023-06-26 DIAGNOSIS — C50.911 MALIGNANT NEOPLASM OF RIGHT BREAST IN FEMALE, ESTROGEN RECEPTOR POSITIVE, UNSPECIFIED SITE OF BREAST: ICD-10-CM

## 2023-06-26 DIAGNOSIS — R07.81 RIB PAIN ON RIGHT SIDE: ICD-10-CM

## 2023-06-26 DIAGNOSIS — Z17.0 MALIGNANT NEOPLASM OF RIGHT BREAST IN FEMALE, ESTROGEN RECEPTOR POSITIVE, UNSPECIFIED SITE OF BREAST: ICD-10-CM

## 2023-06-26 PROCEDURE — 3008F PR BODY MASS INDEX (BMI) DOCUMENTED: ICD-10-PCS | Mod: CPTII,S$GLB,, | Performed by: NURSE PRACTITIONER

## 2023-06-26 PROCEDURE — 99214 OFFICE O/P EST MOD 30 MIN: CPT | Mod: S$GLB,,, | Performed by: NURSE PRACTITIONER

## 2023-06-26 PROCEDURE — 72070 X-RAY EXAM THORAC SPINE 2VWS: CPT | Mod: TC

## 2023-06-26 PROCEDURE — 3008F BODY MASS INDEX DOCD: CPT | Mod: CPTII,S$GLB,, | Performed by: NURSE PRACTITIONER

## 2023-06-26 PROCEDURE — 99999 PR PBB SHADOW E&M-EST. PATIENT-LVL IV: CPT | Mod: PBBFAC,,, | Performed by: NURSE PRACTITIONER

## 2023-06-26 PROCEDURE — 1159F MED LIST DOCD IN RCRD: CPT | Mod: CPTII,S$GLB,, | Performed by: NURSE PRACTITIONER

## 2023-06-26 PROCEDURE — 71100 XR RIBS 2 VIEW RIGHT: ICD-10-PCS | Mod: 26,RT,, | Performed by: RADIOLOGY

## 2023-06-26 PROCEDURE — 71100 X-RAY EXAM RIBS UNI 2 VIEWS: CPT | Mod: TC,RT

## 2023-06-26 PROCEDURE — 3074F PR MOST RECENT SYSTOLIC BLOOD PRESSURE < 130 MM HG: ICD-10-PCS | Mod: CPTII,S$GLB,, | Performed by: NURSE PRACTITIONER

## 2023-06-26 PROCEDURE — 72070 X-RAY EXAM THORAC SPINE 2VWS: CPT | Mod: 26,,, | Performed by: RADIOLOGY

## 2023-06-26 PROCEDURE — 71100 X-RAY EXAM RIBS UNI 2 VIEWS: CPT | Mod: 26,RT,, | Performed by: RADIOLOGY

## 2023-06-26 PROCEDURE — 3074F SYST BP LT 130 MM HG: CPT | Mod: CPTII,S$GLB,, | Performed by: NURSE PRACTITIONER

## 2023-06-26 PROCEDURE — 99214 PR OFFICE/OUTPT VISIT, EST, LEVL IV, 30-39 MIN: ICD-10-PCS | Mod: S$GLB,,, | Performed by: NURSE PRACTITIONER

## 2023-06-26 PROCEDURE — 99999 PR PBB SHADOW E&M-EST. PATIENT-LVL IV: ICD-10-PCS | Mod: PBBFAC,,, | Performed by: NURSE PRACTITIONER

## 2023-06-26 PROCEDURE — 72070 XR THORACIC SPINE AP LATERAL: ICD-10-PCS | Mod: 26,,, | Performed by: RADIOLOGY

## 2023-06-26 PROCEDURE — 1159F PR MEDICATION LIST DOCUMENTED IN MEDICAL RECORD: ICD-10-PCS | Mod: CPTII,S$GLB,, | Performed by: NURSE PRACTITIONER

## 2023-06-26 PROCEDURE — 3078F DIAST BP <80 MM HG: CPT | Mod: CPTII,S$GLB,, | Performed by: NURSE PRACTITIONER

## 2023-06-26 PROCEDURE — 3078F PR MOST RECENT DIASTOLIC BLOOD PRESSURE < 80 MM HG: ICD-10-PCS | Mod: CPTII,S$GLB,, | Performed by: NURSE PRACTITIONER

## 2023-06-26 RX ORDER — ESOMEPRAZOLE MAGNESIUM 40 MG/1
40 CAPSULE, DELAYED RELEASE ORAL
COMMUNITY

## 2023-06-26 NOTE — PROGRESS NOTES
Subjective:       Patient ID: Tracie Scott is a 53 y.o. female.    Chief Complaint: Back Pain (Upper back pain x 1.5 weeks with SOB and pain with movement PS:10)    Patient is known, to me and presents with   Chief Complaint   Patient presents with    Back Pain     Upper back pain x 1.5 weeks with SOB and pain with movement PS:10   .  Denies chest pain and shortness of breath.  Patient who is known to me presents with right thoracic pain and right lateral chest wall pain. States that she had breast cancer about seven years ago. And she is concerned that now she is having this pain. She had a lumpectomy done and several lymph nodes. She is a  so does lift on heavy items, but again she is concerned particularly since she is having so much pain. No sob just hurts when she takes a breath, no cough or fever or chills   Back Pain  Pertinent negatives include no chest pain, dysuria, fever, headaches, numbness or weakness.   Review of Systems   Constitutional:  Negative for activity change, appetite change, fatigue, fever and unexpected weight change.   HENT:  Negative for congestion, ear discharge, ear pain, hearing loss, postnasal drip and tinnitus.    Eyes:  Negative for photophobia, pain and visual disturbance.   Respiratory:  Negative for cough, shortness of breath, wheezing and stridor.    Cardiovascular:  Negative for chest pain, palpitations and leg swelling.   Gastrointestinal:  Negative for abdominal distention.   Genitourinary:  Negative for difficulty urinating, dysuria, frequency, hematuria and urgency.   Musculoskeletal:  Positive for back pain. Negative for arthralgias, gait problem, joint swelling, myalgias, neck pain and neck stiffness.   Skin: Negative.    Neurological:  Negative for dizziness, seizures, syncope, weakness, light-headedness, numbness and headaches.   Hematological:  Negative for adenopathy. Does not bruise/bleed easily.   Psychiatric/Behavioral:  Negative for behavioral  problems, confusion, dysphoric mood, hallucinations, sleep disturbance and suicidal ideas. The patient is not nervous/anxious.      Objective:      Physical Exam  Constitutional:       General: She is not in acute distress.     Appearance: She is well-developed.   HENT:      Head: Normocephalic and atraumatic.      Right Ear: Tympanic membrane and external ear normal.      Left Ear: Tympanic membrane and external ear normal.      Nose: Nose normal.      Mouth/Throat:      Mouth: Mucous membranes are moist.      Pharynx: No oropharyngeal exudate.   Eyes:      General: No scleral icterus.        Right eye: No discharge.         Left eye: No discharge.      Conjunctiva/sclera: Conjunctivae normal.      Pupils: Pupils are equal, round, and reactive to light.   Neck:      Thyroid: No thyromegaly.      Vascular: No JVD.   Cardiovascular:      Rate and Rhythm: Normal rate and regular rhythm.      Heart sounds: Normal heart sounds. No murmur heard.    No friction rub. No gallop.   Pulmonary:      Effort: Pulmonary effort is normal. No respiratory distress.      Breath sounds: Normal breath sounds. No stridor. No wheezing or rales.   Chest:      Chest wall: Tenderness present. No mass.          Comments: Tenderness palpated to right lateral rib cage area.   No masses palpated  Abdominal:      General: Bowel sounds are normal. There is no distension.      Palpations: Abdomen is soft. There is no mass.      Tenderness: There is no abdominal tenderness. There is no right CVA tenderness, left CVA tenderness, guarding or rebound.      Hernia: No hernia is present.   Musculoskeletal:         General: Tenderness present. No swelling, deformity or signs of injury.        Arms:       Cervical back: Normal range of motion and neck supple.      Thoracic back: Tenderness present.        Back:       Right lower leg: No edema.      Left lower leg: No edema.      Comments: Tenderness palpated to above region. No rashes noted  "  Lymphadenopathy:      Cervical: No cervical adenopathy.   Skin:     General: Skin is warm and dry.      Capillary Refill: Capillary refill takes less than 2 seconds.      Coloration: Skin is not jaundiced or pale.      Findings: No bruising, erythema, lesion or rash.   Neurological:      General: No focal deficit present.      Mental Status: She is alert and oriented to person, place, and time.      Cranial Nerves: No cranial nerve deficit.      Sensory: No sensory deficit.      Motor: No weakness or abnormal muscle tone.      Coordination: Coordination normal.      Gait: Gait normal.      Deep Tendon Reflexes: Reflexes are normal and symmetric. Reflexes normal.   Psychiatric:         Attention and Perception: Attention and perception normal.         Mood and Affect: Mood and affect normal. Mood is not anxious or depressed.         Speech: Speech normal.         Behavior: Behavior normal. Behavior is cooperative.         Thought Content: Thought content normal. Thought content does not include homicidal or suicidal ideation. Thought content does not include homicidal or suicidal plan.         Cognition and Memory: Cognition and memory normal.         Judgment: Judgment normal.       Assessment:       1. Malignant neoplasm of right breast in female, estrogen receptor positive, unspecified site of breast    2. Acute right-sided thoracic back pain    3. Rib pain on right side        Plan:   1. Malignant neoplasm of right breast in female, estrogen receptor positive, unspecified site of breast  -     X-Ray Thoracic Spine AP Lateral; Future; Expected date: 06/26/2023  -     X-Ray Ribs 2 View Right; Future; Expected date: 06/26/2023    2. Acute right-sided thoracic back pain  -     X-Ray Thoracic Spine AP Lateral; Future; Expected date: 06/26/2023    3. Rib pain on right side  -     X-Ray Ribs 2 View Right; Future; Expected date: 06/26/2023       "This note will not be shared with the patient."  Will image and contact " patient once I review results   Take tylenol or motrin for pain  Rtc as scheduled

## 2023-06-27 ENCOUNTER — PATIENT MESSAGE (OUTPATIENT)
Dept: INTERNAL MEDICINE | Facility: CLINIC | Age: 53
End: 2023-06-27
Payer: COMMERCIAL

## 2023-07-19 ENCOUNTER — TELEPHONE (OUTPATIENT)
Dept: OBSTETRICS AND GYNECOLOGY | Facility: CLINIC | Age: 53
End: 2023-07-19
Payer: COMMERCIAL

## 2023-07-19 NOTE — TELEPHONE ENCOUNTER
----- Message from Carley Subramanian sent at 7/19/2023  9:56 AM CDT -----  Contact: self  Tracie Scott  MRN: [id number]  Home Phone      [phone]  Work Phone      Not on file.  Mobile          [phone]    Patient Care Team:  Demario Danielle MD as PCP - General (Family Medicine)  Ilan Becker MD as Obstetrician (Obstetrics)  Romy Moran MD as Consulting Physician (Obstetrics and Gynecology)   OB? No  What phone number can you be reached at? [phone]  Message: patient is wanting an appointment CTC scans are not showing good.       
Pt returned call.  Appt scheduled on 07/26/2023 at 9:45am  
Abdomen soft, nontender, nondistended, bowel sounds present in all 4 quadrants.

## 2023-07-26 ENCOUNTER — OFFICE VISIT (OUTPATIENT)
Dept: OBSTETRICS AND GYNECOLOGY | Facility: CLINIC | Age: 53
End: 2023-07-26
Payer: COMMERCIAL

## 2023-07-26 VITALS
WEIGHT: 199.19 LBS | BODY MASS INDEX: 31.26 KG/M2 | DIASTOLIC BLOOD PRESSURE: 82 MMHG | HEIGHT: 67 IN | HEART RATE: 79 BPM | SYSTOLIC BLOOD PRESSURE: 130 MMHG

## 2023-07-26 DIAGNOSIS — N93.8 DUB (DYSFUNCTIONAL UTERINE BLEEDING): ICD-10-CM

## 2023-07-26 DIAGNOSIS — C50.911 MALIGNANT NEOPLASM OF RIGHT BREAST IN FEMALE, ESTROGEN RECEPTOR POSITIVE, UNSPECIFIED SITE OF BREAST: Primary | ICD-10-CM

## 2023-07-26 DIAGNOSIS — Z17.0 MALIGNANT NEOPLASM OF RIGHT BREAST IN FEMALE, ESTROGEN RECEPTOR POSITIVE, UNSPECIFIED SITE OF BREAST: Primary | ICD-10-CM

## 2023-07-26 PROCEDURE — 88305 TISSUE EXAM BY PATHOLOGIST: CPT | Performed by: PATHOLOGY

## 2023-07-26 PROCEDURE — 99499 NO LOS: ICD-10-PCS | Mod: S$GLB,,, | Performed by: STUDENT IN AN ORGANIZED HEALTH CARE EDUCATION/TRAINING PROGRAM

## 2023-07-26 PROCEDURE — 99999 PR PBB SHADOW E&M-EST. PATIENT-LVL III: CPT | Mod: PBBFAC,,, | Performed by: STUDENT IN AN ORGANIZED HEALTH CARE EDUCATION/TRAINING PROGRAM

## 2023-07-26 PROCEDURE — 58100 BIOPSY OF UTERUS LINING: CPT | Mod: S$GLB,,, | Performed by: STUDENT IN AN ORGANIZED HEALTH CARE EDUCATION/TRAINING PROGRAM

## 2023-07-26 PROCEDURE — 58100 ENDOMETRIAL BIOPSY- TODAY: ICD-10-PCS | Mod: S$GLB,,, | Performed by: STUDENT IN AN ORGANIZED HEALTH CARE EDUCATION/TRAINING PROGRAM

## 2023-07-26 PROCEDURE — 99499 UNLISTED E&M SERVICE: CPT | Mod: S$GLB,,, | Performed by: STUDENT IN AN ORGANIZED HEALTH CARE EDUCATION/TRAINING PROGRAM

## 2023-07-26 PROCEDURE — 88305 TISSUE EXAM BY PATHOLOGIST: CPT | Mod: 26,,, | Performed by: PATHOLOGY

## 2023-07-26 PROCEDURE — 99999 PR PBB SHADOW E&M-EST. PATIENT-LVL III: ICD-10-PCS | Mod: PBBFAC,,, | Performed by: STUDENT IN AN ORGANIZED HEALTH CARE EDUCATION/TRAINING PROGRAM

## 2023-07-26 PROCEDURE — 88305 TISSUE EXAM BY PATHOLOGIST: ICD-10-PCS | Mod: 26,,, | Performed by: PATHOLOGY

## 2023-07-26 NOTE — PROCEDURES
Endometrial Biopsy- Today    Date/Time: 7/26/2023 9:45 AM  Performed by: Romy Moran MD  Authorized by: Romy Moran MD     Consent:     Consent obtained:  Written    Consent given by:  Patient    Patient questions answered: yes      Patient agrees, verbalizes understanding, and wants to proceed: yes      Educational handouts given: yes      Instructions and paperwork completed: yes    Indication:     Indications: Other disorder of menstruation and other abnormal bleeding from female genital tract    Pre-procedure:     Pre-procedure timeout performed: yes    Procedure:     Procedure: endometrial biopsy with Pipelle      Cervix cleaned and prepped: yes      Uterus sounded: yes      Uterus sound depth (cm):  11    Specimen collected: specimen collected and sent to pathology      Patient tolerated procedure well with no complications: yes

## 2023-07-31 LAB
FINAL PATHOLOGIC DIAGNOSIS: NORMAL
GROSS: NORMAL
Lab: NORMAL

## 2023-08-02 ENCOUNTER — HOSPITAL ENCOUNTER (OUTPATIENT)
Dept: RADIOLOGY | Facility: HOSPITAL | Age: 53
Discharge: HOME | End: 2023-08-02
Attending: STUDENT IN AN ORGANIZED HEALTH CARE EDUCATION/TRAINING PROGRAM
Payer: COMMERCIAL

## 2023-08-02 DIAGNOSIS — N93.8 DUB (DYSFUNCTIONAL UTERINE BLEEDING): ICD-10-CM

## 2023-08-02 PROCEDURE — 76856 US EXAM PELVIC COMPLETE: CPT | Mod: 26,,, | Performed by: RADIOLOGY

## 2023-08-02 PROCEDURE — 76856 US EXAM PELVIC COMPLETE: CPT | Mod: TC

## 2023-08-02 PROCEDURE — 76830 US PELVIS COMP WITH TRANSVAG NON-OB (XPD): ICD-10-PCS | Mod: 26,,, | Performed by: RADIOLOGY

## 2023-08-02 PROCEDURE — 76856 US PELVIS COMP WITH TRANSVAG NON-OB (XPD): ICD-10-PCS | Mod: 26,,, | Performed by: RADIOLOGY

## 2023-08-02 PROCEDURE — 76830 TRANSVAGINAL US NON-OB: CPT | Mod: 26,,, | Performed by: RADIOLOGY

## 2023-08-03 NOTE — PROGRESS NOTES
Can we please let Ms. Hodges know that the blood in her uterus may be from the biopsy we did. We will repeat the ultrasound in 3 months, if there is any concern at that time we can further discuss definitive management.

## 2024-02-06 ENCOUNTER — TELEPHONE (OUTPATIENT)
Dept: OBSTETRICS AND GYNECOLOGY | Facility: CLINIC | Age: 54
End: 2024-02-06
Payer: COMMERCIAL

## 2024-02-06 NOTE — TELEPHONE ENCOUNTER
----- Message from Danielle Donovan MA sent at 2/6/2024  9:56 AM CST -----  Contact: self  Tracie Scott  MRN: [id number]  Home Phone      [phone]  Work Phone      Not on file.  Mobile          [phone]    Patient Care Team:  Demario Danielle MD as PCP - General (Family Medicine)  Ilan Becker MD as Obstetrician (Obstetrics)  Romy Moran MD as Consulting Physician (Obstetrics and Gynecology)  Michelle Sanchez LPN as Care Coordinator  OB? No  What phone number can you be reached at? [phone]  Message: Would like to make appt for Friday for right ovay pain x1 week.

## 2024-02-12 ENCOUNTER — ANESTHESIA (OUTPATIENT)
Dept: SURGERY | Facility: HOSPITAL | Age: 54
End: 2024-02-12
Payer: COMMERCIAL

## 2024-02-12 ENCOUNTER — ANESTHESIA EVENT (OUTPATIENT)
Dept: SURGERY | Facility: HOSPITAL | Age: 54
End: 2024-02-12
Payer: COMMERCIAL

## 2024-02-12 ENCOUNTER — TELEPHONE (OUTPATIENT)
Dept: OBSTETRICS AND GYNECOLOGY | Facility: CLINIC | Age: 54
End: 2024-02-12
Payer: COMMERCIAL

## 2024-02-12 ENCOUNTER — HOSPITAL ENCOUNTER (OUTPATIENT)
Facility: HOSPITAL | Age: 54
Discharge: HOME | End: 2024-02-12
Attending: OBSTETRICS & GYNECOLOGY | Admitting: OBSTETRICS & GYNECOLOGY
Payer: COMMERCIAL

## 2024-02-12 ENCOUNTER — APPOINTMENT (OUTPATIENT)
Dept: RADIOLOGY | Facility: CLINIC | Age: 54
End: 2024-02-12
Attending: STUDENT IN AN ORGANIZED HEALTH CARE EDUCATION/TRAINING PROGRAM
Payer: COMMERCIAL

## 2024-02-12 VITALS
DIASTOLIC BLOOD PRESSURE: 66 MMHG | SYSTOLIC BLOOD PRESSURE: 134 MMHG | OXYGEN SATURATION: 96 % | RESPIRATION RATE: 18 BRPM | TEMPERATURE: 98 F | HEART RATE: 73 BPM

## 2024-02-12 DIAGNOSIS — N93.8 DUB (DYSFUNCTIONAL UTERINE BLEEDING): Primary | ICD-10-CM

## 2024-02-12 DIAGNOSIS — N95.0 PMB (POSTMENOPAUSAL BLEEDING): Primary | ICD-10-CM

## 2024-02-12 DIAGNOSIS — N93.8 DUB (DYSFUNCTIONAL UTERINE BLEEDING): ICD-10-CM

## 2024-02-12 DIAGNOSIS — N95.0 PMB (POSTMENOPAUSAL BLEEDING): ICD-10-CM

## 2024-02-12 LAB
ABO + RH BLD: NORMAL
BASOPHILS # BLD AUTO: 0.08 K/UL (ref 0–0.2)
BASOPHILS NFR BLD: 0.9 % (ref 0–1.9)
BLD GP AB SCN CELLS X3 SERPL QL: NORMAL
DIFFERENTIAL METHOD BLD: ABNORMAL
EOSINOPHIL # BLD AUTO: 0.3 K/UL (ref 0–0.5)
EOSINOPHIL NFR BLD: 3.4 % (ref 0–8)
ERYTHROCYTE [DISTWIDTH] IN BLOOD BY AUTOMATED COUNT: 12.8 % (ref 11.5–14.5)
HCT VFR BLD AUTO: 38 % (ref 37–48.5)
HGB BLD-MCNC: 12.2 G/DL (ref 12–16)
IMM GRANULOCYTES # BLD AUTO: 0.02 K/UL (ref 0–0.04)
IMM GRANULOCYTES NFR BLD AUTO: 0.2 % (ref 0–0.5)
LYMPHOCYTES # BLD AUTO: 3.3 K/UL (ref 1–4.8)
LYMPHOCYTES NFR BLD: 35.6 % (ref 18–48)
MCH RBC QN AUTO: 27.5 PG (ref 27–31)
MCHC RBC AUTO-ENTMCNC: 32.1 G/DL (ref 32–36)
MCV RBC AUTO: 86 FL (ref 82–98)
MONOCYTES # BLD AUTO: 0.6 K/UL (ref 0.3–1)
MONOCYTES NFR BLD: 6.1 % (ref 4–15)
NEUTROPHILS # BLD AUTO: 5 K/UL (ref 1.8–7.7)
NEUTROPHILS NFR BLD: 53.8 % (ref 38–73)
NRBC BLD-RTO: 0 /100 WBC
PLATELET # BLD AUTO: 394 K/UL (ref 150–450)
PMV BLD AUTO: 8.6 FL (ref 9.2–12.9)
RBC # BLD AUTO: 4.44 M/UL (ref 4–5.4)
SPECIMEN OUTDATE: NORMAL
WBC # BLD AUTO: 9.33 K/UL (ref 3.9–12.7)

## 2024-02-12 PROCEDURE — 71000033 HC RECOVERY, INTIAL HOUR: Performed by: OBSTETRICS & GYNECOLOGY

## 2024-02-12 PROCEDURE — 88305 TISSUE EXAM BY PATHOLOGIST: CPT | Performed by: PATHOLOGY

## 2024-02-12 PROCEDURE — 86850 RBC ANTIBODY SCREEN: CPT | Performed by: STUDENT IN AN ORGANIZED HEALTH CARE EDUCATION/TRAINING PROGRAM

## 2024-02-12 PROCEDURE — 36415 COLL VENOUS BLD VENIPUNCTURE: CPT | Performed by: STUDENT IN AN ORGANIZED HEALTH CARE EDUCATION/TRAINING PROGRAM

## 2024-02-12 PROCEDURE — 37000009 HC ANESTHESIA EA ADD 15 MINS: Performed by: OBSTETRICS & GYNECOLOGY

## 2024-02-12 PROCEDURE — 36000705 HC OR TIME LEV I EA ADD 15 MIN: Performed by: OBSTETRICS & GYNECOLOGY

## 2024-02-12 PROCEDURE — 37000008 HC ANESTHESIA 1ST 15 MINUTES: Performed by: OBSTETRICS & GYNECOLOGY

## 2024-02-12 PROCEDURE — 58120 DILATION AND CURETTAGE: CPT | Mod: ,,, | Performed by: OBSTETRICS & GYNECOLOGY

## 2024-02-12 PROCEDURE — 85025 COMPLETE CBC W/AUTO DIFF WBC: CPT | Performed by: STUDENT IN AN ORGANIZED HEALTH CARE EDUCATION/TRAINING PROGRAM

## 2024-02-12 PROCEDURE — 25000003 PHARM REV CODE 250: Performed by: OBSTETRICS & GYNECOLOGY

## 2024-02-12 PROCEDURE — 25000003 PHARM REV CODE 250: Performed by: NURSE ANESTHETIST, CERTIFIED REGISTERED

## 2024-02-12 PROCEDURE — 76856 US EXAM PELVIC COMPLETE: CPT | Mod: TC

## 2024-02-12 PROCEDURE — 76856 US EXAM PELVIC COMPLETE: CPT | Mod: 26,,, | Performed by: RADIOLOGY

## 2024-02-12 PROCEDURE — D9220AH HC ANESTHESIA PROFESSIONAL FEE: Mod: QZ,P2 | Performed by: NURSE ANESTHETIST, CERTIFIED REGISTERED

## 2024-02-12 PROCEDURE — 36000704 HC OR TIME LEV I 1ST 15 MIN: Performed by: OBSTETRICS & GYNECOLOGY

## 2024-02-12 PROCEDURE — 00940 ANES VAGINAL PX NOS: CPT | Mod: QZ,P2 | Performed by: NURSE ANESTHETIST, CERTIFIED REGISTERED

## 2024-02-12 PROCEDURE — 63600175 PHARM REV CODE 636 W HCPCS: Performed by: NURSE ANESTHETIST, CERTIFIED REGISTERED

## 2024-02-12 PROCEDURE — 88305 TISSUE EXAM BY PATHOLOGIST: CPT | Mod: 26,,, | Performed by: PATHOLOGY

## 2024-02-12 RX ORDER — MIDAZOLAM HYDROCHLORIDE 1 MG/ML
INJECTION INTRAMUSCULAR; INTRAVENOUS
Status: DISCONTINUED | OUTPATIENT
Start: 2024-02-12 | End: 2024-02-12

## 2024-02-12 RX ORDER — SODIUM CHLORIDE 0.9 % (FLUSH) 0.9 %
3 SYRINGE (ML) INJECTION
Status: DISCONTINUED | OUTPATIENT
Start: 2024-02-12 | End: 2024-02-12 | Stop reason: HOSPADM

## 2024-02-12 RX ORDER — KETOROLAC TROMETHAMINE 15 MG/ML
15 INJECTION, SOLUTION INTRAMUSCULAR; INTRAVENOUS EVERY 8 HOURS PRN
Status: DISCONTINUED | OUTPATIENT
Start: 2024-02-12 | End: 2024-02-12 | Stop reason: HOSPADM

## 2024-02-12 RX ORDER — OXYCODONE AND ACETAMINOPHEN 5; 325 MG/1; MG/1
1 TABLET ORAL EVERY 4 HOURS PRN
Status: DISCONTINUED | OUTPATIENT
Start: 2024-02-12 | End: 2024-02-12 | Stop reason: HOSPADM

## 2024-02-12 RX ORDER — SODIUM CHLORIDE 9 MG/ML
INJECTION, SOLUTION INTRAVENOUS CONTINUOUS
Status: DISCONTINUED | OUTPATIENT
Start: 2024-02-12 | End: 2024-02-12 | Stop reason: HOSPADM

## 2024-02-12 RX ORDER — FENTANYL CITRATE 50 UG/ML
INJECTION, SOLUTION INTRAMUSCULAR; INTRAVENOUS
Status: DISCONTINUED | OUTPATIENT
Start: 2024-02-12 | End: 2024-02-12

## 2024-02-12 RX ORDER — SODIUM CHLORIDE, SODIUM LACTATE, POTASSIUM CHLORIDE, CALCIUM CHLORIDE 600; 310; 30; 20 MG/100ML; MG/100ML; MG/100ML; MG/100ML
INJECTION, SOLUTION INTRAVENOUS CONTINUOUS PRN
Status: DISCONTINUED | OUTPATIENT
Start: 2024-02-12 | End: 2024-02-12

## 2024-02-12 RX ORDER — FAMOTIDINE 20 MG/1
20 TABLET, FILM COATED ORAL
Status: SHIPPED | OUTPATIENT
Start: 2024-02-12

## 2024-02-12 RX ORDER — PROPOFOL 10 MG/ML
INJECTION, EMULSION INTRAVENOUS
Status: DISCONTINUED | OUTPATIENT
Start: 2024-02-12 | End: 2024-02-12

## 2024-02-12 RX ORDER — DIPHENHYDRAMINE HYDROCHLORIDE 50 MG/ML
25 INJECTION INTRAMUSCULAR; INTRAVENOUS EVERY 6 HOURS PRN
Status: DISCONTINUED | OUTPATIENT
Start: 2024-02-12 | End: 2024-02-12 | Stop reason: HOSPADM

## 2024-02-12 RX ORDER — OXYCODONE AND ACETAMINOPHEN 5; 325 MG/1; MG/1
1 TABLET ORAL EVERY 4 HOURS PRN
Qty: 20 TABLET | Refills: 0 | Status: ON HOLD | OUTPATIENT
Start: 2024-02-12 | End: 2024-05-13 | Stop reason: HOSPADM

## 2024-02-12 RX ORDER — MUPIROCIN 20 MG/G
OINTMENT TOPICAL
Status: DISCONTINUED | OUTPATIENT
Start: 2024-02-12 | End: 2024-02-12 | Stop reason: HOSPADM

## 2024-02-12 RX ORDER — ONDANSETRON HYDROCHLORIDE 2 MG/ML
INJECTION, SOLUTION INTRAVENOUS
Status: DISCONTINUED | OUTPATIENT
Start: 2024-02-12 | End: 2024-02-12

## 2024-02-12 RX ORDER — ONDANSETRON HYDROCHLORIDE 2 MG/ML
4 INJECTION, SOLUTION INTRAVENOUS DAILY PRN
Status: DISCONTINUED | OUTPATIENT
Start: 2024-02-12 | End: 2024-02-12 | Stop reason: HOSPADM

## 2024-02-12 RX ORDER — KETOROLAC TROMETHAMINE 30 MG/ML
INJECTION, SOLUTION INTRAMUSCULAR; INTRAVENOUS
Status: DISCONTINUED | OUTPATIENT
Start: 2024-02-12 | End: 2024-02-12

## 2024-02-12 RX ORDER — SODIUM CHLORIDE 9 MG/ML
INJECTION, SOLUTION INTRAVENOUS CONTINUOUS
Status: CANCELLED | OUTPATIENT
Start: 2024-02-12

## 2024-02-12 RX ORDER — ACETAMINOPHEN 10 MG/ML
INJECTION, SOLUTION INTRAVENOUS
Status: DISCONTINUED | OUTPATIENT
Start: 2024-02-12 | End: 2024-02-12

## 2024-02-12 RX ORDER — DEXAMETHASONE SODIUM PHOSPHATE 4 MG/ML
INJECTION, SOLUTION INTRA-ARTICULAR; INTRALESIONAL; INTRAMUSCULAR; INTRAVENOUS; SOFT TISSUE
Status: DISCONTINUED | OUTPATIENT
Start: 2024-02-12 | End: 2024-02-12

## 2024-02-12 RX ORDER — MUPIROCIN 20 MG/G
OINTMENT TOPICAL
Status: CANCELLED | OUTPATIENT
Start: 2024-02-12

## 2024-02-12 RX ORDER — LIDOCAINE HYDROCHLORIDE 20 MG/ML
INJECTION INTRAVENOUS
Status: DISCONTINUED | OUTPATIENT
Start: 2024-02-12 | End: 2024-02-12

## 2024-02-12 RX ADMIN — MIDAZOLAM HYDROCHLORIDE 2 MG: 1 INJECTION, SOLUTION INTRAMUSCULAR; INTRAVENOUS at 03:02

## 2024-02-12 RX ADMIN — ACETAMINOPHEN 1000 MG: 10 INJECTION, SOLUTION INTRAVENOUS at 03:02

## 2024-02-12 RX ADMIN — OXYCODONE HYDROCHLORIDE AND ACETAMINOPHEN 1 TABLET: 5; 325 TABLET ORAL at 06:02

## 2024-02-12 RX ADMIN — FENTANYL CITRATE 50 MCG: 50 INJECTION, SOLUTION INTRAMUSCULAR; INTRAVENOUS at 03:02

## 2024-02-12 RX ADMIN — ONDANSETRON 8 MG: 2 INJECTION, SOLUTION INTRAMUSCULAR; INTRAVENOUS at 03:02

## 2024-02-12 RX ADMIN — KETOROLAC TROMETHAMINE 30 MG: 30 INJECTION, SOLUTION INTRAMUSCULAR at 03:02

## 2024-02-12 RX ADMIN — DEXAMETHASONE SODIUM PHOSPHATE 8 MG: 4 INJECTION, SOLUTION INTRAMUSCULAR; INTRAVENOUS at 03:02

## 2024-02-12 RX ADMIN — LIDOCAINE HYDROCHLORIDE 60 MG: 20 INJECTION, SOLUTION INTRAVENOUS at 03:02

## 2024-02-12 RX ADMIN — SODIUM CHLORIDE, SODIUM LACTATE, POTASSIUM CHLORIDE, AND CALCIUM CHLORIDE: .6; .31; .03; .02 INJECTION, SOLUTION INTRAVENOUS at 03:02

## 2024-02-12 RX ADMIN — PROPOFOL 150 MG: 10 INJECTION, EMULSION INTRAVENOUS at 03:02

## 2024-02-12 NOTE — LETTER
February 12, 2024         [address] [facility] [address]  Phone: [phone]  Fax: [phone]       Patient: Tracie Scott   YOB: 1970  Date of Visit: 02/12/2024    To Whom It May Concern:    Jessica Scott  was at [facility] on 02/12/2024. The patient may return to work/school on 2- with no restrictions. If you have any questions or concerns, or if I can be of further assistance, please do not hesitate to contact me.    Sincerely,    Kelly Whipple RN

## 2024-02-12 NOTE — TRANSFER OF CARE
Anesthesia Transfer of Care Note    Patient: Tracie Scott    Procedure(s) Performed: Procedure(s) (LRB):  DILATION AND CURETTAGE, UTERUS (N/A)    Patient location: PACU    Anesthesia Type: general    Transport from OR: Transported from OR on 6-10 L/min O2 by face mask with adequate spontaneous ventilation    Post pain: adequate analgesia    Post assessment: no apparent anesthetic complications and tolerated procedure well    Post vital signs: stable    Level of consciousness: sedated    Nausea/Vomiting: no nausea/vomiting    Complications: none    Transfer of care protocol was followed      Last vitals: Visit Vitals  Breastfeeding No

## 2024-02-12 NOTE — OP NOTE
Preop diagnosis  Vaginal bleeding [N93.9]    Postop diagnosis Same    Procedure D&C     Surgeon Ilan Becker M.D.    Asst. None    Estimated blood loss 150 cc    Anesthesia MAC    Date 2/12/2024    Procedure in detail    After the appropriate informed consents were obtained and lab work found to be within normal limits the patient was taken to the operating room.  She was placed under MAC sedation and prepped and draped in the usual sterile fashion.  She was placed in to the universal stirrups.  A graves speculum was inserted inside the vagina for visualization of the cervix which was grasped in the anterior portion.  Next the uterus was sounded to 9 Centimeters .  Next the cervix was dilated open to 14 mm. Next   a sharp curet was passed into the uterus and all surfaces of the uterus were scraped.  A specimen was obtained and sent to pathology.       Once the procedure was completed all instruments were removed from the vagina.  The patient was taken out of the universal stirrups.  She was awakened from anesthesia.  She was sent to recovery room in stable condition.    Discharge summary    Once patient has met criteria she will go to [facility] where her recovery will continue.  When she is fully awake, urinating on own and tolerating liquids she be allowed to be discharged home.  She'll be instructed to take Aleve for pain to call with any problems and to follow up in our office in 2 week.    Discharge Note      SUMMARY     Admit Date: 2/12/2024    Attending Physician: Ilan Becker MD     Discharge Physician: Ilan Becker MD    Discharge Date: 2/12/2024     Reason for Admission: D&C    Final Diagnoses:   Principal Problem: Abnormal uterine bleeding (AUB)   Secondary Diagnoses:   Active Hospital Problems    Diagnosis  POA    *Abnormal uterine bleeding (AUB) [N93.9]  Yes      Resolved Hospital Problems   No resolved problems to display.       Disposition: Home or Self Care    Procedures Performed:  Procedure(s) (LRB):  DILATION AND CURETTAGE, UTERUS (N/A)    Hospital Course:  patient was admitted for heavy vaginal bleeding and underwent dilatation and curettage.  Procedure was uncomplicated.  Once patient is fully awake and urinating her own and tolerating liquids she be allowed to be discharged home    Consults: none    Discharged Condition: good    Patient Instructions:   Current Discharge Medication List        CONTINUE these medications which have NOT CHANGED    Details   esomeprazole (NEXIUM) 40 MG capsule Take 40 mg by mouth before breakfast.      tamoxifen (NOLVADEX) 20 MG Tab Take 20 mg by mouth once daily.              Medications:  Reconciled Home Medications:      Medication List        ASK your doctor about these medications      esomeprazole 40 MG capsule  Commonly known as: NEXIUM  Take 40 mg by mouth before breakfast.     tamoxifen 20 MG Tab  Commonly known as: NOLVADEX  Take 20 mg by mouth once daily.              Discharge Procedure Orders (must include Diet, Follow-up, Activity)  No discharge procedures on file.      Follow-up Information       Ilan Becker MD Follow up in 2 week(s).    Specialties: Obstetrics, Obstetrics and Gynecology  Contact information:  [address] [place] [address] Dr Elias [place] [id number] [phone]

## 2024-02-12 NOTE — ANESTHESIA PROCEDURE NOTES
Intubation    Date/Time: 2/12/2024 3:45 PM    Performed by: Domingo Holt CRNA  Authorized by: Domingo Holt CRNA    Intubation:     Induction:  Intravenous    Intubated:  Postinduction    Mask Ventilation:  N/a    Attempts:  1    Attempted By:  CRNA    Difficult Airway Encountered?: No      Complications:  None    Airway Device:  Supraglottic airway/LMA    Airway Device Size:  4.0    Style/Cuff Inflation:  Cuffed (inflated to minimal occlusive pressure)    Secured at:  The lips    Placement Verified By:  Capnometry    Complicating Factors:  None    Findings Post-Intubation:  BS equal bilateral and atraumatic/condition of teeth unchanged

## 2024-02-12 NOTE — DISCHARGE SUMMARY
[facility]  Obstetrics & Gynecology  Discharge Summary    Patient Name: Tracie Scott  MRN: [id number]  Admission Date: 2/12/2024  Hospital Length of Stay: 0 days  Discharge Date and Time:  02/12/2024 4:10 PM  Attending Physician: Ilan Becker MD   Discharging Provider: Ilan Becker MD  Primary Care Provider: Demario Danielle MD    HPI:  No notes on file    Hospital Course:  No notes on file    Goals of Care Treatment Preferences:  Code Status: Full Code      Procedure(s) (LRB):  DILATION AND CURETTAGE, UTERUS (N/A)         Significant Diagnostic Studies: N/A      Pending Diagnostic Studies:       Procedure Component Value Units Date/Time    Specimen to Pathology, Surgery Gynecology and Obstetrics [[id number]] Collected: 02/12/24 1558    Order Status: Sent Lab Status: In process Updated: 02/12/24 1558    Specimen: Tissue           Final Active Diagnoses:    Diagnosis Date Noted POA    PRINCIPAL PROBLEM:  Abnormal uterine bleeding (AUB) [N93.9] 09/09/2020 Yes      Problems Resolved During this Admission:        Discharged Condition: good    Disposition:     Follow Up:   Follow-up Information       Ilan Becker MD Follow up in 2 week(s).    Specialties: Obstetrics, Obstetrics and Gynecology  Contact information:  [address] [place] [address] Dr Elias TAN [id number] [phone]                           Patient Instructions:   No discharge procedures on file.  Medications:  Reconciled Home Medications:      Medication List        START taking these medications      oxyCODONE-acetaminophen 5-325 mg per tablet  Commonly known as: PERCOCET  Take 1 tablet by mouth every 4 (four) hours as needed for Pain.            CONTINUE taking these medications      esomeprazole 40 MG capsule  Commonly known as: NEXIUM  Take 40 mg by mouth before breakfast.     tamoxifen 20 MG Tab  Commonly known as: NOLVADEX  Take 20 mg by mouth once daily.              Ilan Becker MD  Obstetrics & Gynecology  [facility]  Surgery

## 2024-02-12 NOTE — ANESTHESIA POSTPROCEDURE EVALUATION
Anesthesia Post Evaluation    Patient: Tracie Scott    Procedure(s) Performed: Procedure(s) (LRB):  DILATION AND CURETTAGE, UTERUS (N/A)    Final Anesthesia Type: general      Patient location during evaluation: PACU  Patient participation: Yes- Able to Participate  Level of consciousness: awake and alert and oriented  Post-procedure vital signs: reviewed and stable  Pain management: adequate  Airway patency: patent    PONV status at discharge: No PONV  Anesthetic complications: no      Cardiovascular status: blood pressure returned to baseline and hemodynamically stable  Respiratory status: unassisted, spontaneous ventilation and room air  Hydration status: euvolemic  Follow-up not needed.              Vitals Value Taken Time   /74 02/12/24 1640   Temp 36.6 °C (97.8 °F) 02/12/24 1609   Pulse 73 02/12/24 1640   Resp 15 02/12/24 1640   SpO2 95 % 02/12/24 1640         Event Time   Out of Recovery 16:40:49         Pain/Tyron Score: Tyron Score: 10 (2/12/2024  4:24 PM)

## 2024-02-12 NOTE — ANESTHESIA PREPROCEDURE EVALUATION
02/12/2024  Tracie Scott is a 53 y.o., female.  Past Medical History:   Diagnosis Date    Breast cancer 05/2015     Past Surgical History:   Procedure Laterality Date    APPENDECTOMY      BREAST LUMPECTOMY      CYST REMOVAL      under left arm, back     port removed  05/2019         Pre-op Assessment    I have reviewed the Patient Summary Reports.     I have reviewed the Nursing Notes. I have reviewed the NPO Status.   I have reviewed the Medications.     Review of Systems  Anesthesia Hx:  No problems with previous Anesthesia   History of prior surgery of interest to airway management or planning:            Denies Personal Hx of Anesthesia complications.                    Social:  Non-Smoker, No Alcohol Use       Hematology/Oncology:  Hematology Normal   Oncology Normal                                   EENT/Dental:  EENT/Dental Normal           Pulmonary:  Pulmonary Normal                       Renal/:  Renal/ Normal                 Hepatic/GI:  Hepatic/GI Normal                 OB/GYN/PEDS:  Heavy vaginal bleeding for D/C               Physical Exam  General: Well nourished, Cooperative, Oriented and Alert    Airway:  Mallampati: II   Mouth Opening: Normal  TM Distance: Normal  Tongue: Normal  Neck ROM: Normal ROM    Dental:  Intact        Anesthesia Plan  Type of Anesthesia, risks & benefits discussed:    Anesthesia Type: Gen Supraglottic Airway, Gen ETT  Intra-op Monitoring Plan: Standard ASA Monitors  Post Op Pain Control Plan: IV/PO Opioids PRN and multimodal analgesia  Induction:  IV  Airway Plan: Direct  Informed Consent: Informed consent signed with the Patient and all parties understand the risks and agree with anesthesia plan.  All questions answered. Patient consented to blood products? Yes  ASA Score: 2 Emergent  Day of Surgery Review of History & Physical: H&P Update referred to the  surgeon/provider.I have interviewed and examined the patient. I have reviewed the patient's H&P dated: 2/12/24. There are no significant changes.     Ready For Surgery From Anesthesia Perspective.     .

## 2024-02-12 NOTE — TELEPHONE ENCOUNTER
Patient reports that she has not had any cycles in over 8 years and started bleeding heavy over the weekend. Patient reports cramping and large clots. She reports saturating the large overnight pads almost every hour. Dr Moran notified and would like patient to have ultrasound done today and will likely need EMB scheduled for another day pending u/s results. Patient scheduled for ultrasound today and will schedule EMB when patient completes ultrasound.

## 2024-02-12 NOTE — H&P (VIEW-ONLY)
Pre-Operative History and Physical   Obstetrics and Gynecology    Tracie Scott is a 53 y.o. female  presenting for emergent D&C due to severe heavy vaginal bleeding. She has a history of breast cancer on Tamoxifen. She is currently bleeding through 1-2 overnight pads per hours.     ROS:  GENERAL: Denies weight gain or weight loss. Feeling well overall.   SKIN: Denies rash or lesions.   HEAD: Denies head injury or headache.   NODES: Denies enlarged lymph nodes.   CHEST: Denies chest pain or shortness of breath.   CARDIOVASCULAR: Denies palpitations or left sided chest pain.   ABDOMEN: No abdominal pain, constipation, diarrhea, nausea, vomiting or rectal bleeding.   URINARY: No frequency, dysuria, hematuria, or burning on urination.  REPRODUCTIVE: See HPI.   BREASTS: The patient performs breast self-examination and denies pain, lumps, or nipple discharge.   HEMATOLOGIC: No easy bruisability or excessive bleeding with the exception of menstrual cycles.  MUSCULOSKELETAL: Denies joint pain or swelling.   NEUROLOGIC: Denies syncope or weakness.   PSYCHIATRIC: Denies depression, anxiety or mood swings.    Past Medical History:   Diagnosis Date    Breast cancer 2015     Past Surgical History:   Procedure Laterality Date    APPENDECTOMY      BREAST LUMPECTOMY      CYST REMOVAL      under left arm, back     port removed  2019     Family History   Problem Relation Age of Onset    Heart disease Father     Cancer Maternal Grandfather         colon, breast, liver    Breast cancer Maternal Grandmother 82    Colon cancer Maternal Grandmother     Cancer Maternal Grandmother         liver    Ovarian cancer Maternal Grandmother     Cervical cancer Maternal Aunt      Review of patient's allergies indicates:   Allergen Reactions    Iodine Shortness Of Breath    Alcohol Other (See Comments)     Arms go numb with drinking alcohol    Pcn  [penicillins]      Other reaction(s): Unknown       Current Outpatient Medications:      esomeprazole (NEXIUM) 40 MG capsule, Take 40 mg by mouth before breakfast., Disp: , Rfl:     tamoxifen (NOLVADEX) 20 MG Tab, Take 20 mg by mouth once daily. , Disp: , Rfl:     Current Facility-Administered Medications:     acetaminophen tablet 650 mg, 650 mg, Oral, Once Bart LEE Jack W., MD    diphenhydrAMINE injection 25 mg, 25 mg, Intravenous, Once Bart LEE Jack W., MD    EPINEPHrine (EPIPEN) 0.3 mg/0.3 mL pen injection 0.3 mg, 0.3 mg, Intramuscular, Bart LEE Jack W., MD    methylPREDNISolone sodium succinate injection 40 mg, 40 mg, Intravenous, Once Bart LEE Jack W., MD    ondansetron disintegrating tablet 4 mg, 4 mg, Oral, Once Bart LEE Jack W., MD    sodium chloride 0.9% 500 mL flush bag, , Intravenous, Bart LEE Jack W., MD    sodium chloride 0.9% flush 10 mL, 10 mL, Intravenous, Bart LEE Jack W., MD  No outpatient medications have been marked as taking for the 2/12/24 encounter (Orders Only) with oRmy Moran MD.     Current Facility-Administered Medications for the 2/12/24 encounter (Orders Only) with Romy Moran MD   Medication Dose Route Frequency Provider Last Rate Last Admin    acetaminophen tablet 650 mg  650 mg Oral Once Lance Van MD        diphenhydrAMINE injection 25 mg  25 mg Intravenous Once Lance Van MD        EPINEPHrine (EPIPEN) 0.3 mg/0.3 mL pen injection 0.3 mg  0.3 mg Intramuscular Lance Van MD        methylPREDNISolone sodium succinate injection 40 mg  40 mg Intravenous Once Lance Van MD        ondansetron disintegrating tablet 4 mg  4 mg Oral Once Lance Van MD        sodium chloride 0.9% 500 mL flush bag   Intravenous Lance Van MD        sodium chloride 0.9% flush 10 mL  10 mL Intravenous PRLance Martin, MD         Social History     Tobacco Use    Smoking status: Never    Smokeless tobacco:  Never   Substance Use Topics    Alcohol use: No    Drug use: No       Last ultrasound 24: significantly thickened endometrial lining; official result pending    There were no vitals filed for this visit.  General Appearance: Alert, appropriate appearance for age. No acute distress, Chest/Respiratory Exam: Normal.   Cardiovascular Exam: regular rate and rhythm, S1, S2 normal, no murmur, click, rub or gallop   Gastrointestinal Exam: soft, non-tender; bowel sounds normal; no masses,  no organomegaly  Pelvic Exam Female: Exam deferred.   Psychiatric Exam: Alert and oriented, appropriate affect.    Assessment: 53 y.o. female  presenting for emergent D&C due to severe heavy vaginal bleeding.    Plan:   Type and screen,  CBC    I have discussed the risks, benefits, indications, and alternatives of the procedure in detail.  The patient verbalizes her understanding.  All questions answered.  Consents signed.  The patient agrees to proceed to proceed as planned.

## 2024-02-12 NOTE — H&P
Pre-Operative History and Physical   Obstetrics and Gynecology    Tracie Scott is a 53 y.o. female  presenting for emergent D&C due to severe heavy vaginal bleeding. She has a history of breast cancer on Tamoxifen. She is currently bleeding through 1-2 overnight pads per hours.     ROS:  GENERAL: Denies weight gain or weight loss. Feeling well overall.   SKIN: Denies rash or lesions.   HEAD: Denies head injury or headache.   NODES: Denies enlarged lymph nodes.   CHEST: Denies chest pain or shortness of breath.   CARDIOVASCULAR: Denies palpitations or left sided chest pain.   ABDOMEN: No abdominal pain, constipation, diarrhea, nausea, vomiting or rectal bleeding.   URINARY: No frequency, dysuria, hematuria, or burning on urination.  REPRODUCTIVE: See HPI.   BREASTS: The patient performs breast self-examination and denies pain, lumps, or nipple discharge.   HEMATOLOGIC: No easy bruisability or excessive bleeding with the exception of menstrual cycles.  MUSCULOSKELETAL: Denies joint pain or swelling.   NEUROLOGIC: Denies syncope or weakness.   PSYCHIATRIC: Denies depression, anxiety or mood swings.    Past Medical History:   Diagnosis Date    Breast cancer 2015     Past Surgical History:   Procedure Laterality Date    APPENDECTOMY      BREAST LUMPECTOMY      CYST REMOVAL      under left arm, back     port removed  2019     Family History   Problem Relation Age of Onset    Heart disease Father     Cancer Maternal Grandfather         colon, breast, liver    Breast cancer Maternal Grandmother 82    Colon cancer Maternal Grandmother     Cancer Maternal Grandmother         liver    Ovarian cancer Maternal Grandmother     Cervical cancer Maternal Aunt      Review of patient's allergies indicates:   Allergen Reactions    Iodine Shortness Of Breath    Alcohol Other (See Comments)     Arms go numb with drinking alcohol    Pcn  [penicillins]      Other reaction(s): Unknown       Current Outpatient Medications:      esomeprazole (NEXIUM) 40 MG capsule, Take 40 mg by mouth before breakfast., Disp: , Rfl:     tamoxifen (NOLVADEX) 20 MG Tab, Take 20 mg by mouth once daily. , Disp: , Rfl:     Current Facility-Administered Medications:     acetaminophen tablet 650 mg, 650 mg, Oral, Once Bart LEE Jack W., MD    diphenhydrAMINE injection 25 mg, 25 mg, Intravenous, Once Bart LEE Jack W., MD    EPINEPHrine (EPIPEN) 0.3 mg/0.3 mL pen injection 0.3 mg, 0.3 mg, Intramuscular, Bart LEE Jack W., MD    methylPREDNISolone sodium succinate injection 40 mg, 40 mg, Intravenous, Once Bart LEE Jack W., MD    ondansetron disintegrating tablet 4 mg, 4 mg, Oral, Once Bart LEE Jack W., MD    sodium chloride 0.9% 500 mL flush bag, , Intravenous, Bart LEE Jack W., MD    sodium chloride 0.9% flush 10 mL, 10 mL, Intravenous, Bart LEE Jack W., MD  No outpatient medications have been marked as taking for the 2/12/24 encounter (Orders Only) with Romy Moran MD.     Current Facility-Administered Medications for the 2/12/24 encounter (Orders Only) with Romy Moran MD   Medication Dose Route Frequency Provider Last Rate Last Admin    acetaminophen tablet 650 mg  650 mg Oral Once Lance Van MD        diphenhydrAMINE injection 25 mg  25 mg Intravenous Once Lance Van MD        EPINEPHrine (EPIPEN) 0.3 mg/0.3 mL pen injection 0.3 mg  0.3 mg Intramuscular Lance Van MD        methylPREDNISolone sodium succinate injection 40 mg  40 mg Intravenous Once Lacne Van MD        ondansetron disintegrating tablet 4 mg  4 mg Oral Once Lance Van MD        sodium chloride 0.9% 500 mL flush bag   Intravenous Lance Van MD        sodium chloride 0.9% flush 10 mL  10 mL Intravenous PRLance Martin, MD         Social History     Tobacco Use    Smoking status: Never    Smokeless tobacco:  Never   Substance Use Topics    Alcohol use: No    Drug use: No       Last ultrasound 24: significantly thickened endometrial lining; official result pending    There were no vitals filed for this visit.  General Appearance: Alert, appropriate appearance for age. No acute distress, Chest/Respiratory Exam: Normal.   Cardiovascular Exam: regular rate and rhythm, S1, S2 normal, no murmur, click, rub or gallop   Gastrointestinal Exam: soft, non-tender; bowel sounds normal; no masses,  no organomegaly  Pelvic Exam Female: Exam deferred.   Psychiatric Exam: Alert and oriented, appropriate affect.    Assessment: 53 y.o. female  presenting for emergent D&C due to severe heavy vaginal bleeding.    Plan:   Type and screen,  CBC    I have discussed the risks, benefits, indications, and alternatives of the procedure in detail.  The patient verbalizes her understanding.  All questions answered.  Consents signed.  The patient agrees to proceed to proceed as planned.

## 2024-02-14 ENCOUNTER — TELEPHONE (OUTPATIENT)
Dept: OBSTETRICS AND GYNECOLOGY | Facility: CLINIC | Age: 54
End: 2024-02-14
Payer: COMMERCIAL

## 2024-02-14 NOTE — TELEPHONE ENCOUNTER
Pt has Ibuprofen 800mg at home, she has taken one with relief.  Notified of recommendations.  Instructed to contact office if bleeding a pad per hour, weakness, dizziness, bruising easily or if bleeding does not subside.  Pt states bleeding has decreased..  Pt v/u.

## 2024-02-14 NOTE — TELEPHONE ENCOUNTER
----- Message from Danielle Donovan MA sent at 2/14/2024 12:07 PM CST -----  Contact: self  Tracie Scott  MRN: [id number]  Home Phone      [phone]  Work Phone      Not on file.  Mobile          [phone]    Patient Care Team:  Demario Danielle MD as PCP - General (Family Medicine)  Ilan Becker MD as Obstetrician (Obstetrics)  Romy Moran MD as Consulting Physician (Obstetrics and Gynecology)  Michelle Sanchez LPN as Care Coordinator  OB? No  What phone number can you be reached at? [phone]  Message: Stated had emergency d&c Friday, and is having pain with heavy bleeding today.

## 2024-02-14 NOTE — TELEPHONE ENCOUNTER
Is she able to take ibuprofen? If so, she can continue that for the discomfort. Some cramping may be normal due to the passage of the blood clots.

## 2024-02-14 NOTE — TELEPHONE ENCOUNTER
Pt had an emergency d&c Monday and she has c/o lower abdominal pain/cramping which had stopped but started again yesterday with some clotting and bleeding. Pt states the bleeding is better, she's only saturating half of a pad which she changes about every 3 hours. Pls advise.

## 2024-02-15 ENCOUNTER — TELEPHONE (OUTPATIENT)
Dept: OBSTETRICS AND GYNECOLOGY | Facility: CLINIC | Age: 54
End: 2024-02-15
Payer: COMMERCIAL

## 2024-02-15 NOTE — TELEPHONE ENCOUNTER
----- Message from Evelyn Estes sent at 2/15/2024 10:50 AM CST -----  Contact: Self  Tracie Scott  MRN: [id number]  Home Phone      [phone]  Work Phone      Not on file.  Mobile          [phone]    Patient Care Team:  Demario Danielle MD as PCP - General (Family Medicine)  Ilan Becker MD as Obstetrician (Obstetrics)  Romy Moran MD as Consulting Physician (Obstetrics and Gynecology)  Michelle Sanchez LPN as Care Coordinator  OB? No  What phone number can you be reached at? [phone]  Message: pt needs to schedule f/u appt from emergency surgery

## 2024-02-19 ENCOUNTER — OFFICE VISIT (OUTPATIENT)
Dept: OBSTETRICS AND GYNECOLOGY | Facility: CLINIC | Age: 54
End: 2024-02-19
Payer: COMMERCIAL

## 2024-02-19 VITALS
BODY MASS INDEX: 30.57 KG/M2 | SYSTOLIC BLOOD PRESSURE: 122 MMHG | DIASTOLIC BLOOD PRESSURE: 72 MMHG | WEIGHT: 194.75 LBS | HEART RATE: 55 BPM | HEIGHT: 67 IN

## 2024-02-19 DIAGNOSIS — Z09 POSTOPERATIVE FOLLOW-UP: Primary | ICD-10-CM

## 2024-02-19 LAB
COMMENT: NORMAL
FINAL PATHOLOGIC DIAGNOSIS: NORMAL
GROSS: NORMAL
Lab: NORMAL

## 2024-02-19 PROCEDURE — 99999 PR PBB SHADOW E&M-EST. PATIENT-LVL III: CPT | Mod: PBBFAC,,, | Performed by: OBSTETRICS & GYNECOLOGY

## 2024-02-19 PROCEDURE — 0503F POSTPARTUM CARE VISIT: CPT | Mod: CPTII,S$GLB,, | Performed by: OBSTETRICS & GYNECOLOGY

## 2024-02-19 PROCEDURE — 3074F SYST BP LT 130 MM HG: CPT | Mod: CPTII,S$GLB,, | Performed by: OBSTETRICS & GYNECOLOGY

## 2024-02-19 PROCEDURE — 1160F RVW MEDS BY RX/DR IN RCRD: CPT | Mod: CPTII,S$GLB,, | Performed by: OBSTETRICS & GYNECOLOGY

## 2024-02-19 PROCEDURE — 3078F DIAST BP <80 MM HG: CPT | Mod: CPTII,S$GLB,, | Performed by: OBSTETRICS & GYNECOLOGY

## 2024-02-19 PROCEDURE — 1159F MED LIST DOCD IN RCRD: CPT | Mod: CPTII,S$GLB,, | Performed by: OBSTETRICS & GYNECOLOGY

## 2024-02-19 NOTE — LETTER
02/19/2024                 [facility]  [address] [place] [id number]  Phone: [phone]   02/19/2024    Patient: Tracie Scott   YOB: 1970   Date of Visit: 2/19/2024       To Whom it May Concern:    Tracie Scott was seen in my clinic on 2/19/2024. She may return to work on February 20, 2024 and may return with no restrictions.    If you have any questions or concerns, please don't hesitate to call.    Sincerely,         Ilan Becker MD

## 2024-02-19 NOTE — PROGRESS NOTES
Subjective:       Patient ID: Tracie Scott is a 53 y.o. female.    Chief Complaint:  Post-op Evaluation (Pt here 1 week PO D&C with no complaints)      History of Present Illness  Patient presents for 1 week postoperative follow-up from D&C.  Patient reports that she had bleeding for almost a week and cramping for several days.  Patient reports feeling back to normal now.  She would like to return to work tomorrow.  Pathology report is still pending.  She is otherwise without gyn complaints.    Menstrual History:  OB History          3    Para   3    Term                AB        Living   4         SAB        IAB        Ectopic        Multiple   1    Live Births                    Menarche age:  No LMP recorded. Patient is premenopausal.         Review of Systems  Review of Systems   Constitutional:  Negative for activity change, appetite change, chills, diaphoresis, fatigue, fever and unexpected weight change.   HENT:  Negative for congestion, dental problem, drooling, ear discharge, ear pain, facial swelling, hearing loss, mouth sores, nosebleeds, postnasal drip, rhinorrhea, sinus pressure, sneezing, sore throat, tinnitus, trouble swallowing and voice change.    Eyes:  Negative for photophobia, pain, discharge, redness, itching and visual disturbance.   Respiratory:  Negative for apnea, cough, choking, chest tightness, shortness of breath, wheezing and stridor.    Cardiovascular:  Negative for chest pain, palpitations and leg swelling.   Gastrointestinal:  Negative for abdominal distention, abdominal pain, anal bleeding, blood in stool, constipation, diarrhea, nausea, rectal pain and vomiting.   Endocrine: Negative for cold intolerance, heat intolerance, polydipsia, polyphagia and polyuria.   Genitourinary:  Negative for decreased urine volume, difficulty urinating, dyspareunia, dysuria, enuresis, flank pain, frequency, genital sores, hematuria, menstrual problem, pelvic pain, urgency, vaginal  bleeding, vaginal discharge and vaginal pain.   Musculoskeletal:  Negative for arthralgias, back pain, gait problem, joint swelling, myalgias, neck pain and neck stiffness.   Skin:  Negative for color change, pallor, rash and wound.   Allergic/Immunologic: Negative for environmental allergies, food allergies and immunocompromised state.   Neurological:  Negative for dizziness, tremors, seizures, syncope, facial asymmetry, speech difficulty, weakness, light-headedness, numbness and headaches.   Hematological:  Negative for adenopathy. Does not bruise/bleed easily.   Psychiatric/Behavioral:  Negative for agitation, behavioral problems, confusion, decreased concentration, dysphoric mood, hallucinations, self-injury, sleep disturbance and suicidal ideas. The patient is not nervous/anxious and is not hyperactive.          Objective:      Physical Exam  Vitals and nursing note reviewed.         Assessment:        1. Postoperative follow-up                Plan:         Tracie was seen today for post-op evaluation.    Diagnoses and all orders for this visit:    Postoperative follow-up

## 2024-04-15 ENCOUNTER — OFFICE VISIT (OUTPATIENT)
Dept: OBSTETRICS AND GYNECOLOGY | Facility: CLINIC | Age: 54
End: 2024-04-15
Payer: COMMERCIAL

## 2024-04-15 VITALS
WEIGHT: 197.88 LBS | OXYGEN SATURATION: 98 % | HEART RATE: 94 BPM | BODY MASS INDEX: 31.06 KG/M2 | DIASTOLIC BLOOD PRESSURE: 78 MMHG | HEIGHT: 67 IN | SYSTOLIC BLOOD PRESSURE: 122 MMHG

## 2024-04-15 DIAGNOSIS — N94.9 UTERINE TENDERNESS: ICD-10-CM

## 2024-04-15 DIAGNOSIS — N94.10 DYSPAREUNIA IN FEMALE: ICD-10-CM

## 2024-04-15 DIAGNOSIS — N81.11 MIDLINE CYSTOCELE: ICD-10-CM

## 2024-04-15 DIAGNOSIS — R10.2 PELVIC PAIN IN FEMALE: Primary | ICD-10-CM

## 2024-04-15 PROCEDURE — 3078F DIAST BP <80 MM HG: CPT | Mod: CPTII,S$GLB,, | Performed by: OBSTETRICS & GYNECOLOGY

## 2024-04-15 PROCEDURE — 99999 PR PBB SHADOW E&M-EST. PATIENT-LVL IV: CPT | Mod: PBBFAC,,, | Performed by: OBSTETRICS & GYNECOLOGY

## 2024-04-15 PROCEDURE — 99213 OFFICE O/P EST LOW 20 MIN: CPT | Mod: S$GLB,,, | Performed by: OBSTETRICS & GYNECOLOGY

## 2024-04-15 PROCEDURE — 1160F RVW MEDS BY RX/DR IN RCRD: CPT | Mod: CPTII,S$GLB,, | Performed by: OBSTETRICS & GYNECOLOGY

## 2024-04-15 PROCEDURE — 3008F BODY MASS INDEX DOCD: CPT | Mod: CPTII,S$GLB,, | Performed by: OBSTETRICS & GYNECOLOGY

## 2024-04-15 PROCEDURE — 1159F MED LIST DOCD IN RCRD: CPT | Mod: CPTII,S$GLB,, | Performed by: OBSTETRICS & GYNECOLOGY

## 2024-04-15 PROCEDURE — 3074F SYST BP LT 130 MM HG: CPT | Mod: CPTII,S$GLB,, | Performed by: OBSTETRICS & GYNECOLOGY

## 2024-04-15 NOTE — PROGRESS NOTES
Subjective:       Patient ID: Tracie Scott is a 53 y.o. female.    Chief Complaint:  Discuss hysterectomy      History of Present Illness  Patient presents complaining of some pelvic pain.  Patient has had an ablation for irregular bleeding.  Patient states it her irregular bleeding has stopped.  Now patient is reporting pain which is often bilateral which occurs 3 to 4 times a week.  Patient denies any pain with bowel movements.  Patient reports that intercourse is uncomfortable.  Patient denies any incontinence of urine.    Menstrual History:  OB History          3    Para   3    Term                AB        Living   4         SAB        IAB        Ectopic        Multiple   1    Live Births                    Menarche age:  No LMP recorded. Patient is premenopausal.         Review of Systems  Review of Systems   Constitutional:  Positive for appetite change and fatigue. Negative for activity change, chills, diaphoresis, fever and unexpected weight change.   HENT:  Negative for congestion, dental problem, drooling, ear discharge, ear pain, facial swelling, hearing loss, mouth sores, nosebleeds, postnasal drip, rhinorrhea, sinus pressure, sneezing, sore throat, tinnitus, trouble swallowing and voice change.    Eyes:  Negative for photophobia, pain, discharge, redness, itching and visual disturbance.   Respiratory:  Positive for shortness of breath. Negative for apnea, cough, choking, chest tightness, wheezing and stridor.    Cardiovascular:  Positive for chest pain. Negative for palpitations and leg swelling.   Gastrointestinal:  Positive for abdominal pain. Negative for abdominal distention, anal bleeding, blood in stool, constipation, diarrhea, nausea, rectal pain and vomiting.   Endocrine: Positive for heat intolerance and polydipsia. Negative for cold intolerance, polyphagia and polyuria.   Genitourinary:  Positive for dyspareunia and pelvic pain. Negative for decreased urine volume,  difficulty urinating, dysuria, enuresis, flank pain, frequency, genital sores, hematuria, menstrual problem, urgency, vaginal bleeding, vaginal discharge and vaginal pain.   Musculoskeletal:  Negative for arthralgias, back pain, gait problem, joint swelling, myalgias, neck pain and neck stiffness.   Skin:  Negative for color change, pallor, rash and wound.   Allergic/Immunologic: Negative for environmental allergies, food allergies and immunocompromised state.   Neurological:  Positive for weakness and numbness. Negative for dizziness, tremors, seizures, syncope, facial asymmetry, speech difficulty, light-headedness and headaches.   Hematological:  Negative for adenopathy. Bruises/bleeds easily.   Psychiatric/Behavioral:  Positive for decreased concentration. Negative for agitation, behavioral problems, confusion, dysphoric mood, hallucinations, self-injury, sleep disturbance and suicidal ideas. The patient is nervous/anxious. The patient is not hyperactive.            Objective:      Physical Exam  Vitals and nursing note reviewed. Exam conducted with a chaperone present.   Abdominal:      Hernia: There is no hernia in the left inguinal area or right inguinal area.   Genitourinary:     General: Normal vulva.      Labia:         Right: No rash, tenderness, lesion or injury.         Left: No rash, tenderness, lesion or injury.       Urethra: No prolapse, urethral pain, urethral swelling or urethral lesion.      Vagina: No signs of injury and foreign body. Prolapsed vaginal walls present. No vaginal discharge, erythema, tenderness or bleeding.      Cervix: No cervical motion tenderness, discharge or friability.      Uterus: Not deviated, not enlarged, not fixed and not tender.       Adnexa:         Right: No mass, tenderness or fullness.          Left: No mass, tenderness or fullness.        Rectum: No external hemorrhoid.   Lymphadenopathy:      Lower Body: No right inguinal adenopathy. No left inguinal adenopathy.              Assessment:        1. Pelvic pain in female    2. Dyspareunia in female    3. Uterine tenderness    4. Midline cystocele                Plan:       Patient would like to proceed with laparoscopic hysterectomy and bilateral salpingo-oophorectomy  Tracie was seen today for discuss hysterectomy.    Diagnoses and all orders for this visit:    Pelvic pain in female    Dyspareunia in female    Uterine tenderness    Midline cystocele

## 2024-04-16 ENCOUNTER — TELEPHONE (OUTPATIENT)
Dept: OBSTETRICS AND GYNECOLOGY | Facility: CLINIC | Age: 54
End: 2024-04-16
Payer: COMMERCIAL

## 2024-04-16 NOTE — TELEPHONE ENCOUNTER
[facility] BSO scheduled for 5/13/24 with pre-op, pre-admit and post op appointments scheduled and discussed with patient.  Instructed to arrive through ER entrance of [facility] at 6:30a day of surgery.  Pre-admit nurse will call day before if there is a time change.  Case request number [id number].  Shandra in surgery department notified of date and time.   Instructed on the following:    WEEK BEFORE SURGERY:  STOP ALL NSAIDS (ibuprofen, Aleve, Aspirin, etc) 7 days prior to your surgery.  NOTIFY your doctor if you are on any of the following medications listed below:  Dulaglutide (Trulicity)  Liraglutide (Victoza)  Semaglutide (Ozempic, Wegovy, Rybelsus)  Exenatide (Byetta, Bydureon)  Lixisenatide (Adlyxin)  Tirzepatide (Mounjaro)  Phentermine (Adipex)  These medications should be stopped 7 days prior to scheduled surgery. Patient verbalized understanding.

## 2024-04-25 ENCOUNTER — OFFICE VISIT (OUTPATIENT)
Dept: OBSTETRICS AND GYNECOLOGY | Facility: CLINIC | Age: 54
End: 2024-04-25
Payer: COMMERCIAL

## 2024-04-25 VITALS
WEIGHT: 198.06 LBS | BODY MASS INDEX: 31.09 KG/M2 | HEART RATE: 63 BPM | SYSTOLIC BLOOD PRESSURE: 126 MMHG | DIASTOLIC BLOOD PRESSURE: 74 MMHG | HEIGHT: 67 IN

## 2024-04-25 DIAGNOSIS — R10.2 PELVIC PAIN IN FEMALE: Primary | ICD-10-CM

## 2024-04-25 DIAGNOSIS — Z01.818 PREOP TESTING: ICD-10-CM

## 2024-04-25 DIAGNOSIS — N94.10 DYSPAREUNIA IN FEMALE: ICD-10-CM

## 2024-04-25 DIAGNOSIS — N94.9 UTERINE TENDERNESS: ICD-10-CM

## 2024-04-25 PROCEDURE — 99499 UNLISTED E&M SERVICE: CPT | Mod: S$GLB,,, | Performed by: OBSTETRICS & GYNECOLOGY

## 2024-04-25 PROCEDURE — 99999 PR PBB SHADOW E&M-EST. PATIENT-LVL III: CPT | Mod: PBBFAC,,, | Performed by: OBSTETRICS & GYNECOLOGY

## 2024-04-25 RX ORDER — MEPERIDINE HYDROCHLORIDE 25 MG/ML
12.5 INJECTION INTRAMUSCULAR; INTRAVENOUS; SUBCUTANEOUS ONCE AS NEEDED
Status: CANCELLED | OUTPATIENT
Start: 2024-04-25 | End: 2024-04-26

## 2024-04-25 RX ORDER — DIPHENHYDRAMINE HCL 25 MG
25 CAPSULE ORAL EVERY 4 HOURS PRN
Status: CANCELLED | OUTPATIENT
Start: 2024-04-25

## 2024-04-25 RX ORDER — ACETAMINOPHEN 325 MG/1
650 TABLET ORAL EVERY 4 HOURS PRN
Status: CANCELLED | OUTPATIENT
Start: 2024-04-25

## 2024-04-25 RX ORDER — DIPHENHYDRAMINE HYDROCHLORIDE 50 MG/ML
25 INJECTION INTRAMUSCULAR; INTRAVENOUS EVERY 4 HOURS PRN
Status: CANCELLED | OUTPATIENT
Start: 2024-04-25

## 2024-04-25 RX ORDER — HYDROMORPHONE HYDROCHLORIDE 1 MG/ML
0.2 INJECTION, SOLUTION INTRAMUSCULAR; INTRAVENOUS; SUBCUTANEOUS EVERY 5 MIN PRN
Status: CANCELLED | OUTPATIENT
Start: 2024-04-25

## 2024-04-25 RX ORDER — HYDROCODONE BITARTRATE AND ACETAMINOPHEN 5; 325 MG/1; MG/1
1 TABLET ORAL EVERY 4 HOURS PRN
Status: CANCELLED | OUTPATIENT
Start: 2024-04-25

## 2024-04-25 RX ORDER — FAMOTIDINE 20 MG/1
20 TABLET, FILM COATED ORAL
Status: CANCELLED | OUTPATIENT
Start: 2024-04-25

## 2024-04-25 RX ORDER — HYDROCODONE BITARTRATE AND ACETAMINOPHEN 10; 325 MG/1; MG/1
1 TABLET ORAL EVERY 4 HOURS PRN
Status: CANCELLED | OUTPATIENT
Start: 2024-04-25

## 2024-04-25 RX ORDER — PROCHLORPERAZINE EDISYLATE 5 MG/ML
5 INJECTION INTRAMUSCULAR; INTRAVENOUS EVERY 6 HOURS PRN
Status: CANCELLED | OUTPATIENT
Start: 2024-04-25

## 2024-04-25 RX ORDER — ACETAMINOPHEN 500 MG
1000 TABLET ORAL
Status: CANCELLED | OUTPATIENT
Start: 2024-04-25

## 2024-04-25 RX ORDER — PREGABALIN 50 MG/1
50 CAPSULE ORAL
Status: CANCELLED | OUTPATIENT
Start: 2024-04-25

## 2024-04-25 RX ORDER — SODIUM CHLORIDE, SODIUM LACTATE, POTASSIUM CHLORIDE, CALCIUM CHLORIDE 600; 310; 30; 20 MG/100ML; MG/100ML; MG/100ML; MG/100ML
INJECTION, SOLUTION INTRAVENOUS CONTINUOUS
Status: CANCELLED | OUTPATIENT
Start: 2024-04-25

## 2024-04-25 RX ORDER — ONDANSETRON HYDROCHLORIDE 2 MG/ML
4 INJECTION, SOLUTION INTRAVENOUS DAILY PRN
Status: CANCELLED | OUTPATIENT
Start: 2024-04-25

## 2024-04-25 RX ORDER — PROCHLORPERAZINE EDISYLATE 5 MG/ML
5 INJECTION INTRAMUSCULAR; INTRAVENOUS EVERY 10 MIN PRN
Status: CANCELLED | OUTPATIENT
Start: 2024-04-25

## 2024-04-25 RX ORDER — LIDOCAINE HYDROCHLORIDE 10 MG/ML
0.5 INJECTION, SOLUTION EPIDURAL; INFILTRATION; INTRACAUDAL; PERINEURAL
Status: CANCELLED | OUTPATIENT
Start: 2024-04-25

## 2024-04-25 RX ORDER — CELECOXIB 200 MG/1
400 CAPSULE ORAL
Status: CANCELLED | OUTPATIENT
Start: 2024-04-25

## 2024-04-25 RX ORDER — CLINDAMYCIN PHOSPHATE 900 MG/50ML
900 INJECTION, SOLUTION INTRAVENOUS
Status: CANCELLED | OUTPATIENT
Start: 2024-04-25

## 2024-04-25 RX ORDER — ONDANSETRON 8 MG/1
8 TABLET, ORALLY DISINTEGRATING ORAL EVERY 8 HOURS PRN
Status: CANCELLED | OUTPATIENT
Start: 2024-04-25

## 2024-04-25 RX ORDER — POLYETHYLENE GLYCOL 3350 17 G/17G
17 POWDER, FOR SOLUTION ORAL DAILY
Status: CANCELLED | OUTPATIENT
Start: 2024-04-25

## 2024-04-25 RX ORDER — HYDROMORPHONE HYDROCHLORIDE 1 MG/ML
1 INJECTION, SOLUTION INTRAMUSCULAR; INTRAVENOUS; SUBCUTANEOUS EVERY 4 HOURS PRN
Status: CANCELLED | OUTPATIENT
Start: 2024-04-25

## 2024-04-25 RX ORDER — AMOXICILLIN 250 MG
1 CAPSULE ORAL 2 TIMES DAILY
Status: CANCELLED | OUTPATIENT
Start: 2024-04-25

## 2024-04-25 RX ORDER — SODIUM CHLORIDE 0.9 % (FLUSH) 0.9 %
3 SYRINGE (ML) INJECTION
Status: CANCELLED | OUTPATIENT
Start: 2024-04-25

## 2024-04-25 RX ORDER — MUPIROCIN 20 MG/G
OINTMENT TOPICAL
Status: CANCELLED | OUTPATIENT
Start: 2024-04-25

## 2024-04-25 NOTE — PROGRESS NOTES
Subjective:       Patient ID: Tracie Scott is a 53 y.o. female.    Chief Complaint:  IUD Check (/) and Pre-op Exam (Pt here to pre op for TLH with no complaints)      History of Present Illness  Patient presents for preop for TLH and BSO.    Menstrual History:  OB History          3    Para   3    Term                AB        Living   4         SAB        IAB        Ectopic        Multiple   1    Live Births                    Menarche age:  No LMP recorded. Patient is premenopausal.         Review of Systems  Review of Systems   Constitutional:  Negative for activity change, appetite change, chills, diaphoresis, fatigue, fever and unexpected weight change.   HENT:  Negative for congestion, dental problem, drooling, ear discharge, ear pain, facial swelling, hearing loss, mouth sores, nosebleeds, postnasal drip, rhinorrhea, sinus pressure, sneezing, sore throat, tinnitus, trouble swallowing and voice change.    Eyes:  Negative for photophobia, pain, discharge, redness, itching and visual disturbance.   Respiratory:  Negative for apnea, cough, choking, chest tightness, shortness of breath, wheezing and stridor.    Cardiovascular:  Negative for chest pain, palpitations and leg swelling.   Gastrointestinal:  Negative for abdominal distention, abdominal pain, anal bleeding, blood in stool, constipation, diarrhea, nausea, rectal pain and vomiting.   Endocrine: Negative for cold intolerance, heat intolerance, polydipsia, polyphagia and polyuria.   Genitourinary:  Positive for pelvic pain. Negative for decreased urine volume, difficulty urinating, dyspareunia, dysuria, enuresis, flank pain, frequency, genital sores, hematuria, menstrual problem, urgency, vaginal bleeding, vaginal discharge and vaginal pain.   Musculoskeletal:  Negative for arthralgias, back pain, gait problem, joint swelling, myalgias, neck pain and neck stiffness.   Skin:  Negative for color change, pallor, rash and wound.    Allergic/Immunologic: Negative for environmental allergies, food allergies and immunocompromised state.   Neurological:  Negative for dizziness, tremors, seizures, syncope, facial asymmetry, speech difficulty, weakness, light-headedness, numbness and headaches.   Hematological:  Negative for adenopathy. Does not bruise/bleed easily.   Psychiatric/Behavioral:  Negative for agitation, behavioral problems, confusion, decreased concentration, dysphoric mood, hallucinations, self-injury, sleep disturbance and suicidal ideas. The patient is not nervous/anxious and is not hyperactive.          Objective:      Physical Exam  Vitals and nursing note reviewed.   Constitutional:       Appearance: She is well-developed.   HENT:      Head: Normocephalic.   Neck:      Thyroid: No thyromegaly.   Cardiovascular:      Rate and Rhythm: Normal rate and regular rhythm.   Pulmonary:      Effort: Pulmonary effort is normal.      Breath sounds: Normal breath sounds.   Abdominal:      General: Bowel sounds are normal.      Palpations: Abdomen is soft. There is no mass.      Tenderness: There is no abdominal tenderness.      Hernia: There is no hernia in the left inguinal area.   Genitourinary:     Vagina: Normal. No foreign body. No vaginal discharge or tenderness.      Cervix: No cervical motion tenderness, discharge or friability.      Uterus: Tender. Not enlarged.       Adnexa:         Right: No mass, tenderness or fullness.          Left: No mass, tenderness or fullness.        Rectum: No external hemorrhoid.   Musculoskeletal:         General: Normal range of motion.      Cervical back: Normal range of motion.   Skin:     General: Skin is dry.   Neurological:      Mental Status: She is alert and oriented to person, place, and time.      Deep Tendon Reflexes: Reflexes are normal and symmetric.   Psychiatric:         Behavior: Behavior normal.         Thought Content: Thought content normal.         Judgment: Judgment normal.          Assessment:        1. Pelvic pain in female    2. Dyspareunia in female    3. Uterine tenderness    4. Preop testing                Plan:         Tracie was seen today for iud check and pre-op exam.    Diagnoses and all orders for this visit:    Pelvic pain in female  -     Full code; Standing  -     Vital signs; Standing  -     Kumar to Gravity; Standing  -     Insert peripheral IV; Standing  -     POCT glucose; Standing  -     Notify physician if BS > 180 for hysterectomy patients; Standing  -     POCT urine pregnancy; Standing  -     Notify Physician/Vital Signs Parameters Urine output less than 0.5mL/kg/hr (with indwelling catheter) or 30 mL/hr (without indwelling catheter) or blood glucose greater than 200 mg/dL; Standing  -     Notify physician; Standing  -     Diet NPO Except for: Medication; Standing  -     Place sequential compression device; Standing  -     Transfer patient; Standing  -     Admit to Phase I PACU, transfer to phase II level of care when Tyron score is 9 out of 10; Standing  -     Vital signs; Standing  -     Straight Cath; Standing  -     Intake and output Per protocol; Standing  -     Apply warming blanket; Standing  -     POCT glucose; Standing  -     Notify Physician (specify); Standing  -     Notify Physician; Standing  -     Notify Physician (specify); Standing  -     Notify Anesthesiologist; Standing  -     Oxygen Continuous; Standing  -     Pulse Oximetry Continuous; Standing  -     Vital signs; Standing  -     Pulse Oximetry Q12H PRN; Standing  -     Remove dressing; Standing  -     Kumar catheter - discontinue; Standing  -     Diet Adult Regular (IDDSI Level 7) [facility]; Standing  -     DISCHARGE PATIENT 1) Tolerating PO, No emesis x 2 hours 2) Ambulates with assistance appropriate to age and health status (to baseline ability) 3) Voided or has been instructed on how to respond if difficulty voiding 4) Vital signs stable (within ...; Standing  -     Discontinue IV;  Standing  -     Activity as tolerated  -     Shower ok after 24 hours  -     Lifting restrictions - no heavy lifting for 6 weeks  -     No driving until pain free and no longer taking narcotics for 48 hours  -     Pelvic Rest  -     Discharge dressing - no dressing change  -     Diet Adult Regular  -     Notify your health care provider if you experience any of the following:  temperature >100.4  -     Notify your health care provider if you experience any of the following:  persistent nausea and vomiting or diarrhea  -     Notify your health care provider if you experience any of the following:  severe uncontrolled pain  -     Notify your health care provider if you experience any of the following:  redness, tenderness, or signs of infection (pain, swelling, redness, odor or green/yellow discharge around incision site)  -     Notify your health care provider if you experience any of the following:  difficulty breathing or increased cough  -     Notify your health care provider if you experience any of the following:  severe persistent headache  -     Notify your health care provider if you experience any of the following:  worsening rash  -     Notify your health care provider if you experience any of the following:  persistent dizziness, light-headedness, or visual disturbances  -     Notify your health care provider if you experience any of the following:  increased confusion or weakness  -     Place in Outpatient - Extended Recovery; Standing  -     CBC Auto Differential; Future  -     EKG 12-lead; Future  -     Type & Screen; Future  -     X-Ray Chest 1 View Pre-OP; Future    Dyspareunia in female  -     Full code; Standing  -     Vital signs; Standing  -     Kumar to Gravity; Standing  -     Insert peripheral IV; Standing  -     POCT glucose; Standing  -     Notify physician if BS > 180 for hysterectomy patients; Standing  -     POCT urine pregnancy; Standing  -     Notify Physician/Vital Signs Parameters  Urine output less than 0.5mL/kg/hr (with indwelling catheter) or 30 mL/hr (without indwelling catheter) or blood glucose greater than 200 mg/dL; Standing  -     Notify physician; Standing  -     Diet NPO Except for: Medication; Standing  -     Place sequential compression device; Standing  -     Transfer patient; Standing  -     Admit to Phase I PACU, transfer to phase II level of care when Tyron score is 9 out of 10; Standing  -     Vital signs; Standing  -     Straight Cath; Standing  -     Intake and output Per protocol; Standing  -     Apply warming blanket; Standing  -     POCT glucose; Standing  -     Notify Physician (specify); Standing  -     Notify Physician; Standing  -     Notify Physician (specify); Standing  -     Notify Anesthesiologist; Standing  -     Oxygen Continuous; Standing  -     Pulse Oximetry Continuous; Standing  -     Vital signs; Standing  -     Pulse Oximetry Q12H PRN; Standing  -     Remove dressing; Standing  -     Kumar catheter - discontinue; Standing  -     Diet Adult Regular (IDDSI Level 7) [facility]; Standing  -     DISCHARGE PATIENT 1) Tolerating PO, No emesis x 2 hours 2) Ambulates with assistance appropriate to age and health status (to baseline ability) 3) Voided or has been instructed on how to respond if difficulty voiding 4) Vital signs stable (within ...; Standing  -     Discontinue IV; Standing  -     Activity as tolerated  -     Shower ok after 24 hours  -     Lifting restrictions - no heavy lifting for 6 weeks  -     No driving until pain free and no longer taking narcotics for 48 hours  -     Pelvic Rest  -     Discharge dressing - no dressing change  -     Diet Adult Regular  -     Notify your health care provider if you experience any of the following:  temperature >100.4  -     Notify your health care provider if you experience any of the following:  persistent nausea and vomiting or diarrhea  -     Notify your health care provider if you experience any of  the following:  severe uncontrolled pain  -     Notify your health care provider if you experience any of the following:  redness, tenderness, or signs of infection (pain, swelling, redness, odor or green/yellow discharge around incision site)  -     Notify your health care provider if you experience any of the following:  difficulty breathing or increased cough  -     Notify your health care provider if you experience any of the following:  severe persistent headache  -     Notify your health care provider if you experience any of the following:  worsening rash  -     Notify your health care provider if you experience any of the following:  persistent dizziness, light-headedness, or visual disturbances  -     Notify your health care provider if you experience any of the following:  increased confusion or weakness  -     Place in Outpatient - Extended Recovery; Standing  -     CBC Auto Differential; Future  -     EKG 12-lead; Future  -     Type & Screen; Future  -     X-Ray Chest 1 View Pre-OP; Future    Uterine tenderness  -     Full code; Standing  -     Vital signs; Standing  -     Kumar to Gravity; Standing  -     Insert peripheral IV; Standing  -     POCT glucose; Standing  -     Notify physician if BS > 180 for hysterectomy patients; Standing  -     POCT urine pregnancy; Standing  -     Notify Physician/Vital Signs Parameters Urine output less than 0.5mL/kg/hr (with indwelling catheter) or 30 mL/hr (without indwelling catheter) or blood glucose greater than 200 mg/dL; Standing  -     Notify physician; Standing  -     Diet NPO Except for: Medication; Standing  -     Place sequential compression device; Standing  -     Transfer patient; Standing  -     Admit to Phase I PACU, transfer to phase II level of care when Tyron score is 9 out of 10; Standing  -     Vital signs; Standing  -     Straight Cath; Standing  -     Intake and output Per protocol; Standing  -     Apply warming blanket; Standing  -     POCT  glucose; Standing  -     Notify Physician (specify); Standing  -     Notify Physician; Standing  -     Notify Physician (specify); Standing  -     Notify Anesthesiologist; Standing  -     Oxygen Continuous; Standing  -     Pulse Oximetry Continuous; Standing  -     Vital signs; Standing  -     Pulse Oximetry Q12H PRN; Standing  -     Remove dressing; Standing  -     Kumar catheter - discontinue; Standing  -     Diet Adult Regular (IDDSI Level 7) [facility]; Standing  -     DISCHARGE PATIENT 1) Tolerating PO, No emesis x 2 hours 2) Ambulates with assistance appropriate to age and health status (to baseline ability) 3) Voided or has been instructed on how to respond if difficulty voiding 4) Vital signs stable (within ...; Standing  -     Discontinue IV; Standing  -     Activity as tolerated  -     Shower ok after 24 hours  -     Lifting restrictions - no heavy lifting for 6 weeks  -     No driving until pain free and no longer taking narcotics for 48 hours  -     Pelvic Rest  -     Discharge dressing - no dressing change  -     Diet Adult Regular  -     Notify your health care provider if you experience any of the following:  temperature >100.4  -     Notify your health care provider if you experience any of the following:  persistent nausea and vomiting or diarrhea  -     Notify your health care provider if you experience any of the following:  severe uncontrolled pain  -     Notify your health care provider if you experience any of the following:  redness, tenderness, or signs of infection (pain, swelling, redness, odor or green/yellow discharge around incision site)  -     Notify your health care provider if you experience any of the following:  difficulty breathing or increased cough  -     Notify your health care provider if you experience any of the following:  severe persistent headache  -     Notify your health care provider if you experience any of the following:  worsening rash  -     Notify your  health care provider if you experience any of the following:  persistent dizziness, light-headedness, or visual disturbances  -     Notify your health care provider if you experience any of the following:  increased confusion or weakness  -     Place in Outpatient - Extended Recovery; Standing  -     CBC Auto Differential; Future  -     EKG 12-lead; Future  -     Type & Screen; Future  -     X-Ray Chest 1 View Pre-OP; Future    Preop testing  -     CBC Auto Differential; Future  -     EKG 12-lead; Future  -     Type & Screen; Future  -     X-Ray Chest 1 View Pre-OP; Future    Other orders  -     Progressive Mobility Protocol (mobilize patient to their highest level of functioning at least twice daily); Standing  -     LIDOcaine (PF) 10 mg/ml (1%) injection 5 mg  -     lactated ringers infusion  -     mupirocin 2 % ointment  -     acetaminophen tablet 1,000 mg  -     celecoxib capsule 400 mg  -     pregabalin capsule 50 mg  -     famotidine tablet 20 mg  -     sodium chloride 0.9% flush 3 mL  -     HYDROmorphone injection 0.2 mg  -     HYDROcodone-acetaminophen 5-325 mg per tablet 1 tablet  -     meperidine (PF) injection 12.5 mg  -     ondansetron injection 4 mg  -     prochlorperazine injection Soln 5 mg  -     Progressive Mobility Protocol (mobilize patient to their highest level of functioning at least twice daily); Standing  -     lactated ringers infusion  -     ondansetron disintegrating tablet 8 mg  -     prochlorperazine injection Soln 5 mg  -     diphenhydrAMINE capsule 25 mg  -     diphenhydrAMINE injection 25 mg  -     acetaminophen tablet 650 mg  -     HYDROcodone-acetaminophen 5-325 mg per tablet 1 tablet  -     HYDROcodone-acetaminophen  mg per tablet 1 tablet  -     HYDROmorphone injection 1 mg  -     senna-docusate 8.6-50 mg per tablet 1 tablet  -     polyethylene glycol packet 17 g  -     clindamycin in D5W 900 mg/50 mL IVPB 900 mg  -     gentamicin (GARAMYCIN) 364.4 mg in sodium chloride 0.9%  100 mL IVPB

## 2024-04-29 ENCOUNTER — TELEPHONE (OUTPATIENT)
Dept: OBSTETRICS AND GYNECOLOGY | Facility: CLINIC | Age: 54
End: 2024-04-29
Payer: COMMERCIAL

## 2024-04-29 NOTE — TELEPHONE ENCOUNTER
----- Message from Danielle Donovan MA sent at 4/29/2024  4:29 PM CDT -----  Contact: SELF / clinic voice mail  Tracie Scott  MRN: [id number]  Home Phone      [phone]  Work Phone      Not on file.  Mobile          [phone]    Patient Care Team:  Demario Danielle MD as PCP - General (Family Medicine)  Ilan Becker MD as Obstetrician (Obstetrics)  Romy Moran MD as Consulting Physician (Obstetrics and Gynecology)  Michelle Sanchez LPN as Care Coordinator  OB? No  What phone number can you be reached at? [phone]  Message: Needed to give fax number for paperwork to be faxed.  Please fax to [phone].

## 2024-05-10 ENCOUNTER — HOSPITAL ENCOUNTER (OUTPATIENT)
Dept: RADIOLOGY | Facility: HOSPITAL | Age: 54
Discharge: HOME | End: 2024-05-10
Attending: OBSTETRICS & GYNECOLOGY
Payer: COMMERCIAL

## 2024-05-10 ENCOUNTER — HOSPITAL ENCOUNTER (OUTPATIENT)
Dept: PREADMISSION TESTING | Facility: HOSPITAL | Age: 54
Discharge: HOME | End: 2024-05-10
Attending: OBSTETRICS & GYNECOLOGY
Payer: COMMERCIAL

## 2024-05-10 ENCOUNTER — HOSPITAL ENCOUNTER (OUTPATIENT)
Dept: PULMONOLOGY | Facility: HOSPITAL | Age: 54
Discharge: HOME | End: 2024-05-10
Attending: OBSTETRICS & GYNECOLOGY
Payer: COMMERCIAL

## 2024-05-10 DIAGNOSIS — N94.10 DYSPAREUNIA IN FEMALE: ICD-10-CM

## 2024-05-10 DIAGNOSIS — Z01.818 PREOP TESTING: ICD-10-CM

## 2024-05-10 DIAGNOSIS — N94.9 UTERINE TENDERNESS: ICD-10-CM

## 2024-05-10 DIAGNOSIS — R10.2 PELVIC PAIN IN FEMALE: ICD-10-CM

## 2024-05-10 PROCEDURE — 93005 ELECTROCARDIOGRAM TRACING: CPT

## 2024-05-10 PROCEDURE — 71045 X-RAY EXAM CHEST 1 VIEW: CPT | Mod: TC

## 2024-05-10 PROCEDURE — 71045 X-RAY EXAM CHEST 1 VIEW: CPT | Mod: 26,,, | Performed by: RADIOLOGY

## 2024-05-10 PROCEDURE — 93010 ELECTROCARDIOGRAM REPORT: CPT | Mod: ,,, | Performed by: INTERNAL MEDICINE

## 2024-05-10 RX ORDER — SULFAMETHOXAZOLE AND TRIMETHOPRIM 200; 40 MG/5ML; MG/5ML
8 SUSPENSION ORAL
COMMUNITY

## 2024-05-10 NOTE — DISCHARGE INSTRUCTIONS
[facility]  Pre Admit Instructions    Day and Date of Procedure:      Call your doctor if you become ill, have an infection, or are taking antibiotics before your surgery  Someone will call you between 1 p.m. And 5 p.m. the workday before the procedure to give you an arrival time       - If your procedure is before 7 a.m. enter through Emergency Room door and check in with them       - 7 a.m. to 5 p.m. Enter through main entrance and check in with registration  You must have a responsible  to bring you home  Only one person will be allowed per patient due to Covid-19 restrictions  You must wear a mask at all times. If you do not have a mask, we will provide you with one. No Exception.   Cafeteria hours for guest: 7am to 10am; 11am to 1:30 pm.    Do NOT eat anything past:   Clear liquids until:  No dairy, creamers, gum or mints the morning of your procedure  You must drink one sports drink before _______    Please    Do not wear makeup, jewelry, nail polish or body piercings  Bring containers/solution for contacts, dentures, bridges - these and hearing aids will be removed before your procedure  Do not bring cash, jewelry or valuables the day of your procedure   No smoking at least 24 hours before your procedure  Wear clothing that is comfortable and easy to take off and put on  Do NOT shave for at least 5 days before your surgery    PRE-OP Hibiclens bath Instructions:    Shower with Hibiclens the Night before and morning of the procedure.   Wash your face with your regular cleanser. DO NOT use Hibiclens on your face.  Thoroughly rinse your body with warm water from the neck down  Apply Hibiclens directly to your skin or on a wet washcloth and wash gently. Do not stand under the water while washing with Hibiclens as you will   remove the wash too soon.  Rinse thoroughly with warm water  DO NOT use your regular soap after you have bathed with the Hibiclens  Do not apply lotion or deodorant   Put on  a clean pair of clothes after each bath          Information about your stay (Please Review)    Before Surgery  Your doctor may order and review labs, x-rays, ECG or other tests as a pre-surgery workup and will call you if there is need for follow up.  If you did not get a Covid test before your procedure, one will be done when you arrive. You must have a negative Covid test result before your procedure.  No smoking inside or outside the hospital. You must leave the campus to smoke.  Wear clothing that is easy to take off and put on.  The hospital will provide you with a gown.  You may bring robe, slippers, nightwear, and toiletries (toothbrush, toothpaste, makeup) if needed.  If your doctor orders a Fleets Enema or other prep, follow package and/or doctors orders.  Brush your teeth and rinse your mouth the morning of surgery, but dont swallow the water.  The nurse will ask questions and check your condition.  The doctor may christy your surgical site.  Compression boots will be placed on your calves to reduce the risk of blood clots.  An IV will be started in pre-procedure area.  The doctor may order medicine to help you relax before surgery.  After Surgery  After you recover in post-procedure area you will go to the medicine floor to recover for a few more hours.  The nurse will check your temperature, breathing, blood pressure, heart rate, IV site, and surgery site.  A diet will be ordered-most start with ice chips and then advance slowly to other foods.  If you have IV fluids the IV pump will beep to let the nurse know that she needs to check it.  You may have a urinary catheter and staff may measure your oral intake and urine output.  Pain medication may be ordered by the doctor after surgery.  If you have a pain management device tell your caretakers not to press the button because of OVERDOSE RISK.  When the nurse or doctor tells you it is okay to get out of bed, ask for help until you are stable.  The nurse  may ask you to turn, cough, and deep breathe to prevent lung problems.  You can use a pillow to hold your incision when you deep breathe or cough to reduce pain.  The nurse will give you discharge instructions--incision care, symptoms to report to your doctor, and your follow-up appointment when you are discharged. You cannot drive yourself home.  Only one person is allowed during your stay due to Covid-19 restrictions. Visiting hours are 8 am to 6 pm.    Goals for Discharge from One Day Surgery  Control pain with an oral medication  Walk without feeling dizzy or weak  Tolerate liquids well  Urinate without difficulty    Things you can do to  Reduce the Risk of Infections or Complications  Wash Hands and use Waterless Hand Sanitizers  Wash hands frequently with soap and warm water for at least 15 seconds.   Use hand sanitizers (alcohol based) often at home and in public if hands are not visibly soiled  Take Antibiotic Exactly as Prescribed  Do not stop antibiotics too soon; you risk developing infection resistant to antibiotics  Take your antibiotic even if you are feeling better and even if they upset your stomach  Call the doctor if you cant tolerate the antibiotic or you have an allergic reaction  Stay Healthy  Take medicines as prescribed by your doctor  Keep your diabetes under control - diet and medication  Get enough rest, exercise and eat a healthy diet  Keep the Wound Clean and Dry  Wash hands before and after taking care of the incision (cut)  Wash hands when you remove a dressing, before you touch/apply a new dressing  Shower and clean incision with antibacterial soap and rinse well if the doctor approves  Allow the cut to dry completely before putting on a clean dressing  Do not touch the part of the bandage that will cover the incision  Do not use ointments unless your doctor tells you to-can promote bacterial growth  If ordered, put ointment directly on the dressing-do not touch the end of the  tube  Do not scrub, remove scabs, or leave a damp dressing on the incision  Do not use peroxide or alcohol to clean the incision unless the doctor tells you to   Do not let children, pets or anyone else contaminate the incision  Stop Smoking To Prevent Infection  Stop smoking-Centers for Disease Control recommends 30 days before surgery  Smokers get more infections after surgery-studies have shown 6 times the risk  Smokers have more scarring and heal slower-open wounds get infected easier  Prevent Respiratory complications  Stop smoking  Turn, cough, and deep breathe even if you have some pain when you do so.  Splint your incision with a pillow when you cough/deep breath, to help control pain.  Do not lie in one position for long periods of time.   Prevent Blood Clots  When you wake move your legs, flex your feet, rotate your ankles, wiggle your toes  Get up when the doctor says its ok.  Dangle your feet from the side of the bed  Report symptoms-leg pain, redness/swelling, warm to touch; fever; shortness of breath, chest pain, severe upper back pain.

## 2024-05-13 ENCOUNTER — ANESTHESIA EVENT (OUTPATIENT)
Dept: SURGERY | Facility: HOSPITAL | Age: 54
End: 2024-05-13
Payer: COMMERCIAL

## 2024-05-13 ENCOUNTER — HOSPITAL ENCOUNTER (OUTPATIENT)
Facility: HOSPITAL | Age: 54
Discharge: HOME | End: 2024-05-13
Attending: OBSTETRICS & GYNECOLOGY | Admitting: OBSTETRICS & GYNECOLOGY
Payer: COMMERCIAL

## 2024-05-13 ENCOUNTER — ANESTHESIA (OUTPATIENT)
Dept: SURGERY | Facility: HOSPITAL | Age: 54
End: 2024-05-13
Payer: COMMERCIAL

## 2024-05-13 VITALS
OXYGEN SATURATION: 98 % | DIASTOLIC BLOOD PRESSURE: 71 MMHG | TEMPERATURE: 98 F | HEART RATE: 64 BPM | SYSTOLIC BLOOD PRESSURE: 127 MMHG | RESPIRATION RATE: 16 BRPM

## 2024-05-13 DIAGNOSIS — N94.9 UTERINE TENDERNESS: ICD-10-CM

## 2024-05-13 DIAGNOSIS — N94.10 DYSPAREUNIA IN FEMALE: ICD-10-CM

## 2024-05-13 DIAGNOSIS — R10.2 PELVIC PAIN IN FEMALE: ICD-10-CM

## 2024-05-13 LAB
OHS QRS DURATION: 86 MS
OHS QTC CALCULATION: 404 MS

## 2024-05-13 PROCEDURE — 71000039 HC RECOVERY, EACH ADD'L HOUR: Performed by: OBSTETRICS & GYNECOLOGY

## 2024-05-13 PROCEDURE — 37000009 HC ANESTHESIA EA ADD 15 MINS: Performed by: OBSTETRICS & GYNECOLOGY

## 2024-05-13 PROCEDURE — 25000003 PHARM REV CODE 250

## 2024-05-13 PROCEDURE — 63600175 PHARM REV CODE 636 W HCPCS: Mod: JZ,TB | Performed by: OBSTETRICS & GYNECOLOGY

## 2024-05-13 PROCEDURE — 25000003 PHARM REV CODE 250: Performed by: OBSTETRICS & GYNECOLOGY

## 2024-05-13 PROCEDURE — 58571 TLH W/T/O 250 G OR LESS: CPT | Mod: 80,,, | Performed by: OBSTETRICS & GYNECOLOGY

## 2024-05-13 PROCEDURE — 88307 TISSUE EXAM BY PATHOLOGIST: CPT | Mod: 26,,, | Performed by: PATHOLOGY

## 2024-05-13 PROCEDURE — 63600175 PHARM REV CODE 636 W HCPCS

## 2024-05-13 PROCEDURE — 88307 TISSUE EXAM BY PATHOLOGIST: CPT | Performed by: PATHOLOGY

## 2024-05-13 PROCEDURE — 71000033 HC RECOVERY, INTIAL HOUR: Performed by: OBSTETRICS & GYNECOLOGY

## 2024-05-13 PROCEDURE — 36000710: Performed by: OBSTETRICS & GYNECOLOGY

## 2024-05-13 PROCEDURE — 00840 ANES IPER PX LOWER ABD NOS: CPT | Mod: QZ,P2 | Performed by: NURSE ANESTHETIST, CERTIFIED REGISTERED

## 2024-05-13 PROCEDURE — 27201423 OPTIME MED/SURG SUP & DEVICES STERILE SUPPLY: Performed by: OBSTETRICS & GYNECOLOGY

## 2024-05-13 PROCEDURE — 37000008 HC ANESTHESIA 1ST 15 MINUTES: Performed by: OBSTETRICS & GYNECOLOGY

## 2024-05-13 PROCEDURE — 58571 TLH W/T/O 250 G OR LESS: CPT | Mod: ,,, | Performed by: OBSTETRICS & GYNECOLOGY

## 2024-05-13 PROCEDURE — D9220AH HC ANESTHESIA PROFESSIONAL FEE: Mod: QZ,P2 | Performed by: NURSE ANESTHETIST, CERTIFIED REGISTERED

## 2024-05-13 PROCEDURE — 36000711: Performed by: OBSTETRICS & GYNECOLOGY

## 2024-05-13 RX ORDER — HYDROCODONE BITARTRATE AND ACETAMINOPHEN 10; 325 MG/1; MG/1
1 TABLET ORAL EVERY 4 HOURS PRN
Status: DISCONTINUED | OUTPATIENT
Start: 2024-05-13 | End: 2024-05-15 | Stop reason: HOSPADM

## 2024-05-13 RX ORDER — KETOROLAC TROMETHAMINE 30 MG/ML
INJECTION, SOLUTION INTRAMUSCULAR; INTRAVENOUS
Status: DISCONTINUED | OUTPATIENT
Start: 2024-05-13 | End: 2024-05-13

## 2024-05-13 RX ORDER — DIPHENHYDRAMINE HYDROCHLORIDE 50 MG/ML
25 INJECTION INTRAMUSCULAR; INTRAVENOUS EVERY 4 HOURS PRN
Status: DISCONTINUED | OUTPATIENT
Start: 2024-05-13 | End: 2024-05-15 | Stop reason: HOSPADM

## 2024-05-13 RX ORDER — ONDANSETRON 8 MG/1
8 TABLET, ORALLY DISINTEGRATING ORAL EVERY 8 HOURS PRN
Status: DISCONTINUED | OUTPATIENT
Start: 2024-05-13 | End: 2024-05-15 | Stop reason: HOSPADM

## 2024-05-13 RX ORDER — FAMOTIDINE 20 MG/1
20 TABLET, FILM COATED ORAL
Status: COMPLETED | OUTPATIENT
Start: 2024-05-13 | End: 2024-05-13

## 2024-05-13 RX ORDER — CELECOXIB 200 MG/1
400 CAPSULE ORAL
Status: COMPLETED | OUTPATIENT
Start: 2024-05-13 | End: 2024-05-13

## 2024-05-13 RX ORDER — PREGABALIN 50 MG/1
50 CAPSULE ORAL
Status: COMPLETED | OUTPATIENT
Start: 2024-05-13 | End: 2024-05-13

## 2024-05-13 RX ORDER — MUPIROCIN 20 MG/G
OINTMENT TOPICAL
Status: DISCONTINUED | OUTPATIENT
Start: 2024-05-13 | End: 2024-05-15 | Stop reason: HOSPADM

## 2024-05-13 RX ORDER — ONDANSETRON HYDROCHLORIDE 2 MG/ML
4 INJECTION, SOLUTION INTRAVENOUS DAILY PRN
Status: DISCONTINUED | OUTPATIENT
Start: 2024-05-13 | End: 2024-05-15 | Stop reason: HOSPADM

## 2024-05-13 RX ORDER — PROCHLORPERAZINE EDISYLATE 5 MG/ML
5 INJECTION INTRAMUSCULAR; INTRAVENOUS EVERY 6 HOURS PRN
Status: DISCONTINUED | OUTPATIENT
Start: 2024-05-13 | End: 2024-05-15 | Stop reason: HOSPADM

## 2024-05-13 RX ORDER — ACETAMINOPHEN 500 MG
1000 TABLET ORAL
Status: COMPLETED | OUTPATIENT
Start: 2024-05-13 | End: 2024-05-13

## 2024-05-13 RX ORDER — PROPOFOL 10 MG/ML
VIAL (ML) INTRAVENOUS
Status: DISCONTINUED | OUTPATIENT
Start: 2024-05-13 | End: 2024-05-13

## 2024-05-13 RX ORDER — MEPERIDINE HYDROCHLORIDE 25 MG/ML
12.5 INJECTION INTRAMUSCULAR; INTRAVENOUS; SUBCUTANEOUS ONCE AS NEEDED
Status: DISCONTINUED | OUTPATIENT
Start: 2024-05-13 | End: 2024-05-14 | Stop reason: HOSPADM

## 2024-05-13 RX ORDER — PROCHLORPERAZINE EDISYLATE 5 MG/ML
5 INJECTION INTRAMUSCULAR; INTRAVENOUS EVERY 10 MIN PRN
Status: DISCONTINUED | OUTPATIENT
Start: 2024-05-13 | End: 2024-05-15 | Stop reason: HOSPADM

## 2024-05-13 RX ORDER — DIPHENHYDRAMINE HCL 25 MG
25 CAPSULE ORAL EVERY 4 HOURS PRN
Status: DISCONTINUED | OUTPATIENT
Start: 2024-05-13 | End: 2024-05-15 | Stop reason: HOSPADM

## 2024-05-13 RX ORDER — SODIUM CHLORIDE, SODIUM LACTATE, POTASSIUM CHLORIDE, CALCIUM CHLORIDE 600; 310; 30; 20 MG/100ML; MG/100ML; MG/100ML; MG/100ML
INJECTION, SOLUTION INTRAVENOUS CONTINUOUS
Status: DISCONTINUED | OUTPATIENT
Start: 2024-05-13 | End: 2024-05-15 | Stop reason: HOSPADM

## 2024-05-13 RX ORDER — DEXAMETHASONE SODIUM PHOSPHATE 4 MG/ML
INJECTION, SOLUTION INTRA-ARTICULAR; INTRALESIONAL; INTRAMUSCULAR; INTRAVENOUS; SOFT TISSUE
Status: DISCONTINUED | OUTPATIENT
Start: 2024-05-13 | End: 2024-05-13

## 2024-05-13 RX ORDER — HYDROCODONE BITARTRATE AND ACETAMINOPHEN 5; 325 MG/1; MG/1
1 TABLET ORAL EVERY 4 HOURS PRN
Status: DISCONTINUED | OUTPATIENT
Start: 2024-05-13 | End: 2024-05-15 | Stop reason: HOSPADM

## 2024-05-13 RX ORDER — ACETAMINOPHEN 325 MG/1
650 TABLET ORAL EVERY 4 HOURS PRN
Status: DISCONTINUED | OUTPATIENT
Start: 2024-05-13 | End: 2024-05-15 | Stop reason: HOSPADM

## 2024-05-13 RX ORDER — CLINDAMYCIN PHOSPHATE 900 MG/50ML
900 INJECTION, SOLUTION INTRAVENOUS
Status: COMPLETED | OUTPATIENT
Start: 2024-05-13 | End: 2024-05-13

## 2024-05-13 RX ORDER — POLYETHYLENE GLYCOL 3350 17 G/17G
17 POWDER, FOR SOLUTION ORAL DAILY
Status: DISCONTINUED | OUTPATIENT
Start: 2024-05-13 | End: 2024-05-15 | Stop reason: HOSPADM

## 2024-05-13 RX ORDER — LIDOCAINE HYDROCHLORIDE 10 MG/ML
0.5 INJECTION, SOLUTION EPIDURAL; INFILTRATION; INTRACAUDAL; PERINEURAL
Status: DISCONTINUED | OUTPATIENT
Start: 2024-05-13 | End: 2024-05-15 | Stop reason: HOSPADM

## 2024-05-13 RX ORDER — OXYCODONE AND ACETAMINOPHEN 5; 325 MG/1; MG/1
1 TABLET ORAL EVERY 4 HOURS PRN
Qty: 20 TABLET | Refills: 0 | Status: SHIPPED | OUTPATIENT
Start: 2024-05-13

## 2024-05-13 RX ORDER — FENTANYL CITRATE 50 UG/ML
INJECTION, SOLUTION INTRAMUSCULAR; INTRAVENOUS
Status: DISCONTINUED | OUTPATIENT
Start: 2024-05-13 | End: 2024-05-13

## 2024-05-13 RX ORDER — LIDOCAINE HYDROCHLORIDE 20 MG/ML
INJECTION, SOLUTION EPIDURAL; INFILTRATION; INTRACAUDAL; PERINEURAL
Status: DISCONTINUED | OUTPATIENT
Start: 2024-05-13 | End: 2024-05-13

## 2024-05-13 RX ORDER — SODIUM CHLORIDE 0.9 % (FLUSH) 0.9 %
3 SYRINGE (ML) INJECTION
Status: DISCONTINUED | OUTPATIENT
Start: 2024-05-13 | End: 2024-05-15 | Stop reason: HOSPADM

## 2024-05-13 RX ORDER — HYDROMORPHONE HYDROCHLORIDE 1 MG/ML
0.2 INJECTION, SOLUTION INTRAMUSCULAR; INTRAVENOUS; SUBCUTANEOUS EVERY 5 MIN PRN
Status: DISCONTINUED | OUTPATIENT
Start: 2024-05-13 | End: 2024-05-15 | Stop reason: HOSPADM

## 2024-05-13 RX ORDER — ROCURONIUM BROMIDE 10 MG/ML
INJECTION, SOLUTION INTRAVENOUS
Status: DISCONTINUED | OUTPATIENT
Start: 2024-05-13 | End: 2024-05-13

## 2024-05-13 RX ORDER — AMOXICILLIN 250 MG
1 CAPSULE ORAL 2 TIMES DAILY
Status: DISCONTINUED | OUTPATIENT
Start: 2024-05-13 | End: 2024-05-15 | Stop reason: HOSPADM

## 2024-05-13 RX ORDER — HYDROMORPHONE HYDROCHLORIDE 1 MG/ML
1 INJECTION, SOLUTION INTRAMUSCULAR; INTRAVENOUS; SUBCUTANEOUS EVERY 4 HOURS PRN
Status: DISCONTINUED | OUTPATIENT
Start: 2024-05-13 | End: 2024-05-15 | Stop reason: HOSPADM

## 2024-05-13 RX ORDER — KETAMINE HCL IN 0.9 % NACL 50 MG/5 ML
SYRINGE (ML) INTRAVENOUS
Status: DISCONTINUED | OUTPATIENT
Start: 2024-05-13 | End: 2024-05-13

## 2024-05-13 RX ORDER — ONDANSETRON HYDROCHLORIDE 2 MG/ML
INJECTION, SOLUTION INTRAVENOUS
Status: DISCONTINUED | OUTPATIENT
Start: 2024-05-13 | End: 2024-05-13

## 2024-05-13 RX ORDER — MIDAZOLAM HYDROCHLORIDE 1 MG/ML
INJECTION INTRAMUSCULAR; INTRAVENOUS
Status: DISCONTINUED | OUTPATIENT
Start: 2024-05-13 | End: 2024-05-13

## 2024-05-13 RX ADMIN — SUGAMMADEX 200 MG: 100 INJECTION, SOLUTION INTRAVENOUS at 08:05

## 2024-05-13 RX ADMIN — DEXAMETHASONE SODIUM PHOSPHATE 8 MG: 4 INJECTION, SOLUTION INTRA-ARTICULAR; INTRALESIONAL; INTRAMUSCULAR; INTRAVENOUS; SOFT TISSUE at 07:05

## 2024-05-13 RX ADMIN — LIDOCAINE HYDROCHLORIDE 100 MG: 20 INJECTION, SOLUTION EPIDURAL; INFILTRATION; INTRACAUDAL; PERINEURAL at 07:05

## 2024-05-13 RX ADMIN — GLYCOPYRROLATE 0.2 MG: 0.2 INJECTION, SOLUTION INTRAMUSCULAR; INTRAVENOUS at 07:05

## 2024-05-13 RX ADMIN — GENTAMICIN SULFATE 364.4 MG: 40 INJECTION, SOLUTION INTRAMUSCULAR; INTRAVENOUS at 07:05

## 2024-05-13 RX ADMIN — ACETAMINOPHEN 1000 MG: 500 TABLET ORAL at 07:05

## 2024-05-13 RX ADMIN — FENTANYL CITRATE 100 MCG: 50 INJECTION, SOLUTION INTRAMUSCULAR; INTRAVENOUS at 07:05

## 2024-05-13 RX ADMIN — MIDAZOLAM HYDROCHLORIDE 2 MG: 1 INJECTION, SOLUTION INTRAMUSCULAR; INTRAVENOUS at 07:05

## 2024-05-13 RX ADMIN — FAMOTIDINE 20 MG: 20 TABLET ORAL at 07:05

## 2024-05-13 RX ADMIN — SODIUM CHLORIDE, SODIUM LACTATE, POTASSIUM CHLORIDE, AND CALCIUM CHLORIDE: .6; .31; .03; .02 INJECTION, SOLUTION INTRAVENOUS at 07:05

## 2024-05-13 RX ADMIN — CLINDAMYCIN PHOSPHATE 900 MG: 900 INJECTION, SOLUTION INTRAVENOUS at 07:05

## 2024-05-13 RX ADMIN — SODIUM CHLORIDE, SODIUM LACTATE, POTASSIUM CHLORIDE, AND CALCIUM CHLORIDE: .6; .31; .03; .02 INJECTION, SOLUTION INTRAVENOUS at 08:05

## 2024-05-13 RX ADMIN — FENTANYL CITRATE 150 MCG: 50 INJECTION, SOLUTION INTRAMUSCULAR; INTRAVENOUS at 07:05

## 2024-05-13 RX ADMIN — CELECOXIB 400 MG: 200 CAPSULE ORAL at 07:05

## 2024-05-13 RX ADMIN — ROCURONIUM BROMIDE 50 MG: 10 INJECTION, SOLUTION INTRAVENOUS at 07:05

## 2024-05-13 RX ADMIN — PREGABALIN 50 MG: 50 CAPSULE ORAL at 07:05

## 2024-05-13 RX ADMIN — KETOROLAC TROMETHAMINE 30 MG: 30 INJECTION INTRAMUSCULAR; INTRAVENOUS at 08:05

## 2024-05-13 RX ADMIN — ONDANSETRON 4 MG: 2 INJECTION INTRAMUSCULAR; INTRAVENOUS at 07:05

## 2024-05-13 RX ADMIN — Medication 50 MG: at 07:05

## 2024-05-13 RX ADMIN — PROPOFOL 100 MG: 10 INJECTION, EMULSION INTRAVENOUS at 07:05

## 2024-05-13 NOTE — OP NOTE
preop diagnosis   Pelvic pain, uterine tenderness, dyspareunia    Postop diagnosis  Same    Procedure   TLH and BSO    Surgeon  Ilan Becker    Asst.  Kellin Reece    Specimen uterus with attached cervix bilateral tubes and ovaries    Estimated blood loss  100 cc    Anesthesia Gen.    Findings  Normal-appearing uterus and tubes and ovaries bilaterally.    Procedure in detail      After the appropriate informed consents were obtained and laboratory found to be within normal limits the patient was taken to the operating room where she was placed in a general anesthesia, she was prepped and draped in the usual sterile fashion, she was placed in the universal stirrups.  A small 10 mm midline incision was made in the umbilicus.  Through this was passed a varies needle.  Initial free flow of new fluid was noted initial low reading pressure of CO2 gas was noted.  CO2 gas was allowed to fill the belly until a pressure of 15.  The varies needle was removed.  A 10 mm trocar with the attached laprascope were placed the same incision.  Entrance into the abdomen was assured.  Right and left lateral ports were placed under direct visualization.  First beginning on the patient's left.The fallopian tube was grasped with a 5 mm grasper.   The harmonic scalpel was used to take down the ovarian support and blood supply.  It was continued all the way to the round ligament which was transected with Harmonic scalpel.  Bladder flap was created anteriorly.  Peritoneum was excised posterior.  The uterine arteries were skeletonized.  The gyrus was used to coagulate across the uterine arteries.  They were transected with the Harmonic scalpel.  Same procedure was repeated on the patient's right.  Following this a sponge stick was placed in the vagina for exposure anteriorly.  The bladder was cleared and Harmonic scalpel was used to make an incision in the anterior vagina.  Uterus was elevated and sponge stick was placed in the  posterior cuff.  The incision was again made with the harmonic scalpel posteriorly.  Following this attention was first turned the patient's right side where the broad ligament was dissected away.  The uterus and cervix were mostly  on that side attention was into the patient's right where again broad ligament was allowed to be pulled away using harmonic scalpel and the cervix and uterus were completely dissected away. Once it was freed from the left side also it was delivered through the vagina.  Hemoperitoneum was reestablished.  The cuff was closed using interrupted sutures of 2-0 Vicryl.  Following this the pelvis was irrigated with good hemostasis noted.  Both ureters were noted to peristalse.  Interceed was placed in the cervical stump.  The CO2 gas was allowed to escape from the abdomen.  Patient was taken out of the universal stirrups the incisions were repaired with a subcutaneous stitch of 3-0 undyed Vicryl and dressed.  The patient was awakened from anesthesia and sent to recovery room in stable condition.     Harmonic scalpel and gyrus = voyant    Discharge Note      SUMMARY     Admit Date: 5/13/2024    Attending Physician: Ilan Becker MD     Discharge Physician: Ilan Becker MD    Discharge Date: 5/13/2024     Reason for Admission:  Laparoscopic hysterectomy    Final Diagnoses:   Principal Problem: Pelvic pain in female   Secondary Diagnoses:   Active Hospital Problems    Diagnosis  POA    *Pelvic pain in female [R10.2]  Unknown    Uterine tenderness [N94.9]  Unknown    Dyspareunia in female [N94.10]  Unknown      Resolved Hospital Problems   No resolved problems to display.       Disposition: Home or Self Care    Procedures Performed: Procedure(s) (LRB):  HYSTERECTOMY, TOTAL, LAPAROSCOPIC (N/A)  SALPINGO-OOPHORECTOMY, LAPAROSCOPIC (Bilateral)    Hospital Course:  Patient was admitted and underwent laparoscopic hysterectomy.  Procedure was uncomplicated.  Once patient is fully  awake and urinating her own and tolerating liquids she be allowed to be discharged home.    Consults: none    Discharged Condition: good    Patient Instructions:   Current Discharge Medication List        CONTINUE these medications which have CHANGED    Details   oxyCODONE-acetaminophen (PERCOCET) 5-325 mg per tablet Take 1 tablet by mouth every 4 (four) hours as needed for Pain.  Qty: 20 tablet, Refills: 0           CONTINUE these medications which have NOT CHANGED    Details   esomeprazole (NEXIUM) 40 MG capsule Take 40 mg by mouth before breakfast.      sulfamethoxazole-trimethoprim 200-40 mg/5 ml (BACTRIM,SEPTRA) 200-40 mg/5 mL Susp Take 8 mg/kg/day by mouth every 12 (twelve) hours.      tamoxifen (NOLVADEX) 20 MG Tab Take 20 mg by mouth once daily.              Medications:  Reconciled Home Medications:      Medication List        CONTINUE taking these medications      oxyCODONE-acetaminophen 5-325 mg per tablet  Commonly known as: PERCOCET  Take 1 tablet by mouth every 4 (four) hours as needed for Pain.     sulfamethoxazole-trimethoprim 200-40 mg/5 ml 200-40 mg/5 mL Susp  Commonly known as: BACTRIM,SEPTRA  Take 8 mg/kg/day by mouth every 12 (twelve) hours.            ASK your doctor about these medications      esomeprazole 40 MG capsule  Commonly known as: NEXIUM  Take 40 mg by mouth before breakfast.     tamoxifen 20 MG Tab  Commonly known as: NOLVADEX  Take 20 mg by mouth once daily.              Discharge Procedure Orders (must include Diet, Follow-up, Activity)  No discharge procedures on file.      Follow-up Information       Ilan Becker MD Follow up in 2 week(s).    Specialties: Obstetrics, Obstetrics and Gynecology  Why: post op follow up  Contact information:  [address] [facility] [id number] [phone]

## 2024-05-13 NOTE — ANESTHESIA PREPROCEDURE EVALUATION
05/13/2024  Tracie Scott is a 54 y.o., female.  Past Medical History:   Diagnosis Date    Breast cancer 05/2015     Past Surgical History:   Procedure Laterality Date    APPENDECTOMY      BREAST LUMPECTOMY      CYST REMOVAL      under left arm, back     DILATION AND CURETTAGE OF UTERUS N/A 2/12/2024    Procedure: DILATION AND CURETTAGE, UTERUS;  Surgeon: Ilan Becker MD;  Location: [facility] OR;  Service: OB/GYN;  Laterality: N/A;    port removed  05/2019         Pre-op Assessment    I have reviewed the Patient Summary Reports.     I have reviewed the Nursing Notes. I have reviewed the NPO Status.   I have reviewed the Medications.     Review of Systems  Anesthesia Hx:  No problems with previous Anesthesia   History of prior surgery of interest to airway management or planning:            Denies Personal Hx of Anesthesia complications.                    Social:  Non-Smoker, No Alcohol Use       Hematology/Oncology:  Hematology Normal   Oncology Normal                                   EENT/Dental:  EENT/Dental Normal           Cardiovascular:  Cardiovascular Normal Exercise tolerance: good                                           Pulmonary:  Pulmonary Normal                       Renal/:  Renal/ Normal                 Hepatic/GI:  Hepatic/GI Normal                 Musculoskeletal:  Musculoskeletal Normal                Neurological:    Neuromuscular Disease,                                   Endocrine:  Endocrine Normal            Dermatological:  Skin Normal    Psych:  Psychiatric Normal                  Physical Exam  General: Well nourished, Cooperative, Oriented and Alert    Airway:  Mallampati: II   Mouth Opening: Normal  TM Distance: Normal  Tongue: Normal  Neck ROM: Normal ROM    Dental:  Intact      Anesthesia Plan  Type of Anesthesia, risks & benefits discussed:    Anesthesia  Type: Gen ETT  Intra-op Monitoring Plan: Standard ASA Monitors  Post Op Pain Control Plan: IV/PO Opioids PRN and multimodal analgesia  Induction:  IV  Airway Plan: Direct  Informed Consent: Informed consent signed with the Patient and all parties understand the risks and agree with anesthesia plan.  All questions answered. Patient consented to blood products? Yes  ASA Score: 2  Day of Surgery Review of History & Physical: H&P Update referred to the surgeon/provider.I have interviewed and examined the patient. I have reviewed the patient's H&P dated: 5/13/24. There are no significant changes.     Ready For Surgery From Anesthesia Perspective.     .

## 2024-05-13 NOTE — INTERVAL H&P NOTE
The patient has been examined and the H&P has been reviewed:        I concur with the findings and changes have been noted since the H&P was written: Patient presents complaining of some pelvic pain.  Patient has had an ablation for irregular bleeding.  Patient states it her irregular bleeding has stopped.  Now patient is reporting pain which is often bilateral which occurs 3 to 4 times a week.  Patient denies any pain with bowel movements.  Patient reports that intercourse is uncomfortable.  Patient denies any incontinence of urine        Patient cleared for Anesthesia: General        Anesthesia/Surgery risks, benefits and alternative options discussed and understood by patient/family.      There are no hospital problems to display for this patient.

## 2024-05-13 NOTE — ANESTHESIA PROCEDURE NOTES
Intubation    Date/Time: 5/13/2024 7:42 AM    Performed by: Rocky Hatch  Authorized by: Mele Gresham CRNA    Intubation:     Induction:  Intravenous    Intubated:  Postinduction    Mask Ventilation:  Easy with oral airway    Attempts:  1    Attempted By:  Student and CRNA    Method of Intubation:  Video laryngoscopy    Blade:  Bruno 3    Laryngeal View Grade: Grade I - full view of cords      Difficult Airway Encountered?: No      Complications:  None    Airway Device:  Oral endotracheal tube    Airway Device Size:  7.5    Style/Cuff Inflation:  Cuffed (inflated to minimal occlusive pressure)    Inflation Amount (mL):  10    Tube secured:  21    Secured at:  The lips    Placement Verified By:  Capnometry and Revisualization with laryngoscopy    Complicating Factors:  None    Findings Post-Intubation:  BS equal bilateral and atraumatic/condition of teeth unchanged       Itzel Calabrese is a 54year old female presenting with     Chief Complaint   Patient presents with    Abdominal Pain     Ovarian cyst and Tumor on the ureter       Chaperone declined    Patient denies allergy to latex. Medication list reviewed: yes    Patient would like communication of their results via:      Cell Phone:   Telephone Information:   Mobile [phone]     Okay to leave a message containing results?  Yes    PCP:  Robbin Diehl MD    Health Maintenance Due   Topic Date Due    Colorectal Cancer Screen-  Never done    COVID-19 Vaccine (5 - 2023-24 season) 09/01/2023

## 2024-05-13 NOTE — ANESTHESIA POSTPROCEDURE EVALUATION
Anesthesia Post Evaluation    Patient: Tracie Scott    Procedure(s) Performed: Procedure(s) (LRB):  HYSTERECTOMY, TOTAL, LAPAROSCOPIC (N/A)  SALPINGO-OOPHORECTOMY, LAPAROSCOPIC (Bilateral)    Final Anesthesia Type: general      Patient location during evaluation: PACU  Patient participation: Yes- Able to Participate  Level of consciousness: awake and alert, oriented and awake  Post-procedure vital signs: reviewed and stable  Pain management: adequate  Airway patency: patent    PONV status at discharge: No PONV  Anesthetic complications: no      Cardiovascular status: blood pressure returned to baseline, hemodynamically stable and stable  Respiratory status: unassisted, spontaneous ventilation and room air  Hydration status: euvolemic  Follow-up not needed.              Vitals Value Taken Time   /82 05/13/24 0953   Temp 36.4 °C (97.6 °F) 05/13/24 0852   Pulse 67 05/13/24 0953   Resp 16 05/13/24 0953   SpO2 96 % 05/13/24 0953         No case tracking events are documented in the log.      Pain/Tyron Score: Pain Rating Prior to Med Admin: 0 (5/13/2024  7:05 AM)  Tyron Score: 10 (5/13/2024  9:14 AM)

## 2024-05-13 NOTE — TRANSFER OF CARE
Anesthesia Transfer of Care Note    Patient: Tracie Scott    Procedure(s) Performed: Procedure(s) (LRB):  HYSTERECTOMY, TOTAL, LAPAROSCOPIC (N/A)  SALPINGO-OOPHORECTOMY, LAPAROSCOPIC (Bilateral)    Patient location: PACU    Anesthesia Type: general    Transport from OR: Transported from OR on room air with adequate spontaneous ventilation    Post pain: adequate analgesia    Post assessment: no apparent anesthetic complications and tolerated procedure well    Post vital signs: stable    Level of consciousness: sedated    Nausea/Vomiting: no nausea/vomiting    Complications: none    Transfer of care protocol was followed      Last vitals: Visit Vitals  /81 (BP Location: Left arm, Patient Position: Lying)   Pulse 78   Temp 36.4 °C (97.6 °F) (Skin)   Resp 16   SpO2 (!) 92%   Breastfeeding No

## 2024-05-22 LAB
COMMENT: NORMAL
FINAL PATHOLOGIC DIAGNOSIS: NORMAL
GROSS: NORMAL
Lab: NORMAL

## 2024-05-28 ENCOUNTER — OFFICE VISIT (OUTPATIENT)
Dept: OBSTETRICS AND GYNECOLOGY | Facility: CLINIC | Age: 54
End: 2024-05-28
Payer: COMMERCIAL

## 2024-05-28 VITALS
SYSTOLIC BLOOD PRESSURE: 152 MMHG | HEART RATE: 68 BPM | HEIGHT: 67 IN | DIASTOLIC BLOOD PRESSURE: 90 MMHG | WEIGHT: 194.25 LBS | BODY MASS INDEX: 30.49 KG/M2

## 2024-05-28 DIAGNOSIS — Z09 POSTOPERATIVE FOLLOW-UP: Primary | ICD-10-CM

## 2024-05-28 PROCEDURE — 3077F SYST BP >= 140 MM HG: CPT | Mod: CPTII,S$GLB,, | Performed by: OBSTETRICS & GYNECOLOGY

## 2024-05-28 PROCEDURE — 99999 PR PBB SHADOW E&M-EST. PATIENT-LVL III: CPT | Mod: PBBFAC,,, | Performed by: OBSTETRICS & GYNECOLOGY

## 2024-05-28 PROCEDURE — 99024 POSTOP FOLLOW-UP VISIT: CPT | Mod: S$GLB,,, | Performed by: OBSTETRICS & GYNECOLOGY

## 2024-05-28 PROCEDURE — 3080F DIAST BP >= 90 MM HG: CPT | Mod: CPTII,S$GLB,, | Performed by: OBSTETRICS & GYNECOLOGY

## 2024-05-28 PROCEDURE — 1159F MED LIST DOCD IN RCRD: CPT | Mod: CPTII,S$GLB,, | Performed by: OBSTETRICS & GYNECOLOGY

## 2024-05-28 PROCEDURE — 1160F RVW MEDS BY RX/DR IN RCRD: CPT | Mod: CPTII,S$GLB,, | Performed by: OBSTETRICS & GYNECOLOGY

## 2024-05-28 NOTE — PROGRESS NOTES
Subjective:       Patient ID: Tracie Scott is a 54 y.o. female.    Chief Complaint:  Post-op Evaluation (Pt here 2 week PO TLH with no complaints)      History of Present Illness  Patient presents for 2 week postoperative follow-up from laparoscopic hysterectomy.  Patient states she does not have any bleeding and her pain is dramatically better.  She does have a little bit of lower back pain.  She does admit to some constipation and a quasi the to may be of the same cause.  She is otherwise without gyn complaints.  Pathology report was benign.    Menstrual History:  OB History          3    Para   3    Term                AB        Living   4         SAB        IAB        Ectopic        Multiple   1    Live Births                    Menarche age:  No LMP recorded (lmp unknown). Patient has had a hysterectomy.         Review of Systems  Review of Systems   Constitutional:  Negative for activity change, appetite change, chills, diaphoresis, fatigue, fever and unexpected weight change.   HENT:  Negative for congestion, dental problem, drooling, ear discharge, ear pain, facial swelling, hearing loss, mouth sores, nosebleeds, postnasal drip, rhinorrhea, sinus pressure, sneezing, sore throat, tinnitus, trouble swallowing and voice change.    Eyes:  Negative for photophobia, pain, discharge, redness, itching and visual disturbance.   Respiratory:  Negative for apnea, cough, choking, chest tightness, shortness of breath, wheezing and stridor.    Cardiovascular:  Negative for chest pain, palpitations and leg swelling.   Gastrointestinal:  Positive for constipation. Negative for abdominal distention, abdominal pain, anal bleeding, blood in stool, diarrhea, nausea, rectal pain and vomiting.   Endocrine: Positive for polydipsia. Negative for cold intolerance, heat intolerance, polyphagia and polyuria.   Genitourinary:  Negative for decreased urine volume, difficulty urinating, dyspareunia, dysuria, enuresis,  flank pain, frequency, genital sores, hematuria, menstrual problem, pelvic pain, urgency, vaginal bleeding, vaginal discharge and vaginal pain.   Musculoskeletal:  Positive for back pain. Negative for arthralgias, gait problem, joint swelling, myalgias, neck pain and neck stiffness.   Skin:  Negative for color change, pallor, rash and wound.   Allergic/Immunologic: Negative for environmental allergies, food allergies and immunocompromised state.   Neurological:  Negative for dizziness, tremors, seizures, syncope, facial asymmetry, speech difficulty, weakness, light-headedness, numbness and headaches.   Hematological:  Negative for adenopathy. Does not bruise/bleed easily.   Psychiatric/Behavioral:  Negative for agitation, behavioral problems, confusion, decreased concentration, dysphoric mood, hallucinations, self-injury, sleep disturbance and suicidal ideas. The patient is not nervous/anxious and is not hyperactive.            Objective:      Physical Exam  Vitals and nursing note reviewed.        Incisions clean dry and intact.  Abdomen soft nontender   Assessment:        1. Postoperative follow-up                Plan:         Tracie was seen today for post-op evaluation.    Diagnoses and all orders for this visit:    Postoperative follow-up

## 2024-06-13 ENCOUNTER — OFFICE VISIT (OUTPATIENT)
Dept: OBSTETRICS AND GYNECOLOGY | Facility: CLINIC | Age: 54
End: 2024-06-13
Payer: COMMERCIAL

## 2024-06-13 VITALS
HEIGHT: 67 IN | WEIGHT: 195.13 LBS | DIASTOLIC BLOOD PRESSURE: 80 MMHG | HEART RATE: 91 BPM | SYSTOLIC BLOOD PRESSURE: 118 MMHG | BODY MASS INDEX: 30.63 KG/M2

## 2024-06-13 DIAGNOSIS — Z09 POSTOPERATIVE FOLLOW-UP: Primary | ICD-10-CM

## 2024-06-13 PROCEDURE — 1159F MED LIST DOCD IN RCRD: CPT | Mod: CPTII,S$GLB,, | Performed by: OBSTETRICS & GYNECOLOGY

## 2024-06-13 PROCEDURE — 1160F RVW MEDS BY RX/DR IN RCRD: CPT | Mod: CPTII,S$GLB,, | Performed by: OBSTETRICS & GYNECOLOGY

## 2024-06-13 PROCEDURE — 99999 PR PBB SHADOW E&M-EST. PATIENT-LVL III: CPT | Mod: PBBFAC,,, | Performed by: OBSTETRICS & GYNECOLOGY

## 2024-06-13 PROCEDURE — 3079F DIAST BP 80-89 MM HG: CPT | Mod: CPTII,S$GLB,, | Performed by: OBSTETRICS & GYNECOLOGY

## 2024-06-13 PROCEDURE — 3074F SYST BP LT 130 MM HG: CPT | Mod: CPTII,S$GLB,, | Performed by: OBSTETRICS & GYNECOLOGY

## 2024-06-13 PROCEDURE — 99024 POSTOP FOLLOW-UP VISIT: CPT | Mod: S$GLB,,, | Performed by: OBSTETRICS & GYNECOLOGY

## 2024-06-13 NOTE — LETTER
June 13, 2024      [facility]  [address] [place] [id number]  Phone: [phone]       Patient: Tracie Scott   YOB: 1970  Date of Visit: 06/13/2024    To Whom It May Concern:    Jessica Scott  was at [facility] on 06/13/2024. The patient may return to work/school on June 17, 2024 with no restrictions. If you have any questions or concerns, or if I can be of further assistance, please do not hesitate to contact me.    Sincerely,    Ilan Becker MD

## 2024-06-13 NOTE — PROGRESS NOTES
Subjective:       Patient ID: Tracie Scott is a 54 y.o. female.    Chief Complaint:  Post-op Evaluation      History of Present Illness  Patient presents for 4 week follow-up from a laparoscopic hysterectomy.  Patient reports she is feeling well would like to return to work.  She is otherwise without gyn complaints.    Menstrual History:  OB History          3    Para   3    Term                AB        Living   4         SAB        IAB        Ectopic        Multiple   1    Live Births                    Menarche age:  No LMP recorded (lmp unknown). Patient has had a hysterectomy.         Review of Systems  Review of Systems   Constitutional:  Negative for activity change, appetite change, chills, diaphoresis, fatigue, fever and unexpected weight change.   HENT:  Negative for congestion, dental problem, drooling, ear discharge, ear pain, facial swelling, hearing loss, mouth sores, nosebleeds, postnasal drip, rhinorrhea, sinus pressure, sneezing, sore throat, tinnitus, trouble swallowing and voice change.    Eyes:  Negative for photophobia, pain, discharge, redness, itching and visual disturbance.   Respiratory:  Negative for apnea, cough, choking, chest tightness, shortness of breath, wheezing and stridor.    Cardiovascular:  Negative for chest pain, palpitations and leg swelling.   Gastrointestinal:  Positive for abdominal pain and constipation. Negative for abdominal distention, anal bleeding, blood in stool, diarrhea, nausea, rectal pain and vomiting.   Endocrine: Negative for cold intolerance, heat intolerance, polydipsia, polyphagia and polyuria.   Genitourinary:  Positive for vaginal bleeding. Negative for decreased urine volume, difficulty urinating, dyspareunia, dysuria, enuresis, flank pain, frequency, genital sores, hematuria, menstrual problem, pelvic pain, urgency, vaginal discharge and vaginal pain.   Musculoskeletal:  Negative for arthralgias, back pain, gait problem, joint  swelling, myalgias, neck pain and neck stiffness.   Skin:  Negative for color change, pallor, rash and wound.   Allergic/Immunologic: Negative for environmental allergies, food allergies and immunocompromised state.   Neurological:  Negative for dizziness, tremors, seizures, syncope, facial asymmetry, speech difficulty, weakness, light-headedness, numbness and headaches.   Hematological:  Negative for adenopathy. Does not bruise/bleed easily.   Psychiatric/Behavioral:  Negative for agitation, behavioral problems, confusion, decreased concentration, dysphoric mood, hallucinations, self-injury, sleep disturbance and suicidal ideas. The patient is not nervous/anxious and is not hyperactive.          Objective:      Physical Exam  Vitals and nursing note reviewed.   Abdominal:      Comments: Incisions clean dry and intact         Assessment:        1. Postoperative follow-up                Plan:         Tracie was seen today for post-op evaluation.    Diagnoses and all orders for this visit:    Postoperative follow-up

## 2024-09-26 ENCOUNTER — TELEPHONE (OUTPATIENT)
Dept: OBSTETRICS AND GYNECOLOGY | Facility: CLINIC | Age: 54
End: 2024-09-26

## 2024-09-26 NOTE — TELEPHONE ENCOUNTER
----- Message from Danielle Donovan MA sent at 9/26/2024 11:33 AM CDT -----  Contact: self  Tracie Scott  MRN: [id number]  Home Phone      [phone]  Work Phone      Not on file.  Mobile          [phone]    Patient Care Team:  Demario Danielle MD as PCP - General (Family Medicine)  Ilan Becker MD as Obstetrician (Obstetrics)  Romy Moran MD as Consulting Physician (Obstetrics and Gynecology)  Michelle Sanchez LPN as Care Coordinator  OB? No  What phone number can you be reached at? [phone]  Message: Would like to speak to nurse regarding abd pain x3 weeks, stated had hysterectomy in August.

## 2024-09-27 ENCOUNTER — OFFICE VISIT (OUTPATIENT)
Dept: INTERNAL MEDICINE | Facility: CLINIC | Age: 54
End: 2024-09-27
Payer: COMMERCIAL

## 2024-09-27 ENCOUNTER — HOSPITAL ENCOUNTER (EMERGENCY)
Facility: HOSPITAL | Age: 54
Discharge: HOME | End: 2024-09-27
Attending: STUDENT IN AN ORGANIZED HEALTH CARE EDUCATION/TRAINING PROGRAM
Payer: COMMERCIAL

## 2024-09-27 VITALS
HEART RATE: 62 BPM | RESPIRATION RATE: 16 BRPM | WEIGHT: 189.81 LBS | OXYGEN SATURATION: 99 % | SYSTOLIC BLOOD PRESSURE: 145 MMHG | DIASTOLIC BLOOD PRESSURE: 72 MMHG | TEMPERATURE: 98 F | HEIGHT: 67 IN | BODY MASS INDEX: 29.79 KG/M2

## 2024-09-27 VITALS
BODY MASS INDEX: 29.9 KG/M2 | HEART RATE: 62 BPM | WEIGHT: 190.5 LBS | RESPIRATION RATE: 16 BRPM | OXYGEN SATURATION: 98 % | SYSTOLIC BLOOD PRESSURE: 130 MMHG | HEIGHT: 67 IN | DIASTOLIC BLOOD PRESSURE: 84 MMHG

## 2024-09-27 DIAGNOSIS — N12 PYELONEPHRITIS: Primary | ICD-10-CM

## 2024-09-27 DIAGNOSIS — R10.9 LEFT FLANK PAIN: ICD-10-CM

## 2024-09-27 DIAGNOSIS — N23 RENAL COLIC: ICD-10-CM

## 2024-09-27 DIAGNOSIS — N05.9 NEPHRITIS: Primary | ICD-10-CM

## 2024-09-27 LAB
ALBUMIN SERPL BCP-MCNC: 4.4 G/DL (ref 3.5–5.2)
ALP SERPL-CCNC: 114 U/L (ref 55–135)
ALT SERPL W/O P-5'-P-CCNC: 19 U/L (ref 10–44)
ANION GAP SERPL CALC-SCNC: 11 MMOL/L (ref 8–16)
AST SERPL-CCNC: 18 U/L (ref 10–40)
BACTERIA #/AREA URNS HPF: ABNORMAL /HPF
BASOPHILS # BLD AUTO: 0.05 K/UL (ref 0–0.2)
BASOPHILS NFR BLD: 0.6 % (ref 0–1.9)
BILIRUB SERPL-MCNC: 0.8 MG/DL (ref 0.1–1)
BILIRUB UR QL STRIP: NEGATIVE
BUN SERPL-MCNC: 10 MG/DL (ref 6–20)
CALCIUM SERPL-MCNC: 10.2 MG/DL (ref 8.7–10.5)
CHLORIDE SERPL-SCNC: 105 MMOL/L (ref 95–110)
CLARITY UR: CLEAR
CO2 SERPL-SCNC: 25 MMOL/L (ref 23–29)
COLOR UR: YELLOW
CREAT SERPL-MCNC: 0.8 MG/DL (ref 0.5–1.4)
DIFFERENTIAL METHOD BLD: ABNORMAL
EOSINOPHIL # BLD AUTO: 2.2 K/UL (ref 0–0.5)
EOSINOPHIL NFR BLD: 24.3 % (ref 0–8)
ERYTHROCYTE [DISTWIDTH] IN BLOOD BY AUTOMATED COUNT: 12.8 % (ref 11.5–14.5)
EST. GFR  (NO RACE VARIABLE): >60 ML/MIN/1.73 M^2
GLUCOSE SERPL-MCNC: 92 MG/DL (ref 70–110)
GLUCOSE UR QL STRIP: NEGATIVE
HCT VFR BLD AUTO: 38.6 % (ref 37–48.5)
HGB BLD-MCNC: 12.8 G/DL (ref 12–16)
HGB UR QL STRIP: ABNORMAL
HYALINE CASTS #/AREA URNS LPF: 1 /LPF
IMM GRANULOCYTES # BLD AUTO: 0.02 K/UL (ref 0–0.04)
IMM GRANULOCYTES NFR BLD AUTO: 0.2 % (ref 0–0.5)
KETONES UR QL STRIP: NEGATIVE
LACTATE SERPL-SCNC: 0.8 MMOL/L (ref 0.5–2.2)
LEUKOCYTE ESTERASE UR QL STRIP: ABNORMAL
LIPASE SERPL-CCNC: 48 U/L (ref 4–60)
LYMPHOCYTES # BLD AUTO: 3 K/UL (ref 1–4.8)
LYMPHOCYTES NFR BLD: 32.7 % (ref 18–48)
MCH RBC QN AUTO: 27.3 PG (ref 27–31)
MCHC RBC AUTO-ENTMCNC: 33.2 G/DL (ref 32–36)
MCV RBC AUTO: 82 FL (ref 82–98)
MICROSCOPIC COMMENT: ABNORMAL
MONOCYTES # BLD AUTO: 0.4 K/UL (ref 0.3–1)
MONOCYTES NFR BLD: 4.4 % (ref 4–15)
NEUTROPHILS # BLD AUTO: 3.4 K/UL (ref 1.8–7.7)
NEUTROPHILS NFR BLD: 37.8 % (ref 38–73)
NITRITE UR QL STRIP: POSITIVE
NON-SQ EPI CELLS #/AREA URNS HPF: 3 /HPF
NRBC BLD-RTO: 0 /100 WBC
PH UR STRIP: 6 [PH] (ref 5–8)
PLATELET # BLD AUTO: 354 K/UL (ref 150–450)
PMV BLD AUTO: 9 FL (ref 9.2–12.9)
POTASSIUM SERPL-SCNC: 3.9 MMOL/L (ref 3.5–5.1)
PROT SERPL-MCNC: 7.6 G/DL (ref 6–8.4)
PROT UR QL STRIP: NEGATIVE
RBC # BLD AUTO: 4.69 M/UL (ref 4–5.4)
RBC #/AREA URNS HPF: 3 /HPF (ref 0–4)
SODIUM SERPL-SCNC: 141 MMOL/L (ref 136–145)
SP GR UR STRIP: 1.01 (ref 1–1.03)
SQUAMOUS #/AREA URNS HPF: 2 /HPF
URN SPEC COLLECT METH UR: ABNORMAL
UROBILINOGEN UR STRIP-ACNC: NEGATIVE EU/DL
WBC # BLD AUTO: 9.05 K/UL (ref 3.9–12.7)
WBC #/AREA URNS HPF: >100 /HPF (ref 0–5)
WBC CLUMPS URNS QL MICRO: ABNORMAL

## 2024-09-27 PROCEDURE — 3075F SYST BP GE 130 - 139MM HG: CPT | Mod: CPTII,S$GLB,, | Performed by: FAMILY MEDICINE

## 2024-09-27 PROCEDURE — 87186 SC STD MICRODIL/AGAR DIL: CPT | Performed by: STUDENT IN AN ORGANIZED HEALTH CARE EDUCATION/TRAINING PROGRAM

## 2024-09-27 PROCEDURE — 96365 THER/PROPH/DIAG IV INF INIT: CPT

## 2024-09-27 PROCEDURE — 87077 CULTURE AEROBIC IDENTIFY: CPT | Performed by: STUDENT IN AN ORGANIZED HEALTH CARE EDUCATION/TRAINING PROGRAM

## 2024-09-27 PROCEDURE — 83690 ASSAY OF LIPASE: CPT | Performed by: STUDENT IN AN ORGANIZED HEALTH CARE EDUCATION/TRAINING PROGRAM

## 2024-09-27 PROCEDURE — 99285 EMERGENCY DEPT VISIT HI MDM: CPT | Mod: 25

## 2024-09-27 PROCEDURE — 3079F DIAST BP 80-89 MM HG: CPT | Mod: CPTII,S$GLB,, | Performed by: FAMILY MEDICINE

## 2024-09-27 PROCEDURE — 87086 URINE CULTURE/COLONY COUNT: CPT | Performed by: STUDENT IN AN ORGANIZED HEALTH CARE EDUCATION/TRAINING PROGRAM

## 2024-09-27 PROCEDURE — 83605 ASSAY OF LACTIC ACID: CPT | Performed by: STUDENT IN AN ORGANIZED HEALTH CARE EDUCATION/TRAINING PROGRAM

## 2024-09-27 PROCEDURE — 3008F BODY MASS INDEX DOCD: CPT | Mod: CPTII,S$GLB,, | Performed by: FAMILY MEDICINE

## 2024-09-27 PROCEDURE — 85025 COMPLETE CBC W/AUTO DIFF WBC: CPT | Performed by: STUDENT IN AN ORGANIZED HEALTH CARE EDUCATION/TRAINING PROGRAM

## 2024-09-27 PROCEDURE — 87147 CULTURE TYPE IMMUNOLOGIC: CPT | Performed by: STUDENT IN AN ORGANIZED HEALTH CARE EDUCATION/TRAINING PROGRAM

## 2024-09-27 PROCEDURE — 63600175 PHARM REV CODE 636 W HCPCS: Performed by: STUDENT IN AN ORGANIZED HEALTH CARE EDUCATION/TRAINING PROGRAM

## 2024-09-27 PROCEDURE — 96375 TX/PRO/DX INJ NEW DRUG ADDON: CPT

## 2024-09-27 PROCEDURE — 80053 COMPREHEN METABOLIC PANEL: CPT | Performed by: STUDENT IN AN ORGANIZED HEALTH CARE EDUCATION/TRAINING PROGRAM

## 2024-09-27 PROCEDURE — 81000 URINALYSIS NONAUTO W/SCOPE: CPT | Performed by: STUDENT IN AN ORGANIZED HEALTH CARE EDUCATION/TRAINING PROGRAM

## 2024-09-27 PROCEDURE — 99999 PR PBB SHADOW E&M-EST. PATIENT-LVL III: CPT | Mod: PBBFAC,,, | Performed by: FAMILY MEDICINE

## 2024-09-27 PROCEDURE — 87088 URINE BACTERIA CULTURE: CPT | Performed by: STUDENT IN AN ORGANIZED HEALTH CARE EDUCATION/TRAINING PROGRAM

## 2024-09-27 PROCEDURE — 99213 OFFICE O/P EST LOW 20 MIN: CPT | Mod: S$GLB,,, | Performed by: FAMILY MEDICINE

## 2024-09-27 PROCEDURE — 1159F MED LIST DOCD IN RCRD: CPT | Mod: CPTII,S$GLB,, | Performed by: FAMILY MEDICINE

## 2024-09-27 PROCEDURE — 25000003 PHARM REV CODE 250: Performed by: STUDENT IN AN ORGANIZED HEALTH CARE EDUCATION/TRAINING PROGRAM

## 2024-09-27 RX ORDER — OXYCODONE AND ACETAMINOPHEN 5; 325 MG/1; MG/1
1 TABLET ORAL EVERY 6 HOURS PRN
Qty: 3 TABLET | Refills: 0 | Status: SHIPPED | OUTPATIENT
Start: 2024-09-27

## 2024-09-27 RX ORDER — KETOROLAC TROMETHAMINE 30 MG/ML
15 INJECTION, SOLUTION INTRAMUSCULAR; INTRAVENOUS
Status: COMPLETED | OUTPATIENT
Start: 2024-09-27 | End: 2024-09-27

## 2024-09-27 RX ORDER — CIPROFLOXACIN 2 MG/ML
400 INJECTION, SOLUTION INTRAVENOUS
Status: COMPLETED | OUTPATIENT
Start: 2024-09-27 | End: 2024-09-27

## 2024-09-27 RX ORDER — CIPROFLOXACIN 500 MG/1
500 TABLET ORAL 2 TIMES DAILY
Qty: 20 TABLET | Refills: 0 | Status: SHIPPED | OUTPATIENT
Start: 2024-09-27 | End: 2024-10-07

## 2024-09-27 RX ADMIN — KETOROLAC TROMETHAMINE 15 MG: 30 INJECTION, SOLUTION INTRAMUSCULAR; INTRAVENOUS at 12:09

## 2024-09-27 RX ADMIN — CIPROFLOXACIN 400 MG: 2 INJECTION, SOLUTION INTRAVENOUS at 12:09

## 2024-09-27 RX ADMIN — SODIUM CHLORIDE 1000 ML: 9 INJECTION, SOLUTION INTRAVENOUS at 11:09

## 2024-09-27 NOTE — ED PROVIDER NOTES
Encounter Date: 9/27/2024       History     Chief Complaint   Patient presents with    Abdominal Pain     54-year-old female with history of breast cancer, appendectomy, hysterectomy, presenting with left-sided abdominal pain/flank pain that began around two weeks ago and it has been steadily worsening.  Also reports diarrhea.  No fever, nausea, vomiting.  Patient was sent here by a PCP due to concern for possible kidney infection.        Review of patient's allergies indicates:   Allergen Reactions    Iodine Shortness Of Breath    Alcohol Other (See Comments)     Arms go numb with drinking alcohol    Pcn  [penicillins]      Other reaction(s): Unknown     Past Medical History:   Diagnosis Date    Breast cancer 05/2015     Past Surgical History:   Procedure Laterality Date    APPENDECTOMY      BREAST LUMPECTOMY      CYST REMOVAL      under left arm, back     DILATION AND CURETTAGE OF UTERUS N/A 2/12/2024    Procedure: DILATION AND CURETTAGE, UTERUS;  Surgeon: Ilan Becker MD;  Location: [facility];  Service: OB/GYN;  Laterality: N/A;    LAPAROSCOPIC SALPINGO-OOPHORECTOMY Bilateral 5/13/2024    Procedure: SALPINGO-OOPHORECTOMY, LAPAROSCOPIC;  Surgeon: Ilan Becker MD;  Location: [facility];  Service: OB/GYN;  Laterality: Bilateral;    LAPAROSCOPIC TOTAL HYSTERECTOMY N/A 5/13/2024    Procedure: HYSTERECTOMY, TOTAL, LAPAROSCOPIC;  Surgeon: Ilan Becker MD;  Location: [facility];  Service: OB/GYN;  Laterality: N/A;    port removed  05/2019     Family History   Problem Relation Name Age of Onset    Heart disease Father      Cancer Maternal Grandfather          colon, breast, liver    Breast cancer Maternal Grandmother  82    Colon cancer Maternal Grandmother      Cancer Maternal Grandmother          liver    Ovarian cancer Maternal Grandmother      Cervical cancer Maternal Aunt       Social History     Tobacco Use    Smoking status: Never    Smokeless tobacco: Never   Substance Use Topics    Alcohol use: No     Drug use: No     Review of Systems   Constitutional:  Negative for fever.   HENT:  Negative for sore throat.    Respiratory:  Negative for shortness of breath.    Cardiovascular:  Negative for chest pain.   Gastrointestinal:  Positive for abdominal pain. Negative for diarrhea, nausea and vomiting.   Genitourinary:  Positive for flank pain. Negative for dysuria.   Musculoskeletal:  Negative for back pain.   Skin:  Negative for rash.   Neurological:  Negative for weakness.   Hematological:  Does not bruise/bleed easily.       Physical Exam     Initial Vitals [09/27/24 1122]   BP Pulse Resp Temp SpO2   (!) 177/91 64 18 97.8 °F (36.6 °C) 100 %      MAP       --         Physical Exam    Nursing note and vitals reviewed.  Constitutional: She appears well-developed.   HENT:   Head: Normocephalic.   Eyes: Pupils are equal, round, and reactive to light.   Neck:   Normal range of motion.  Cardiovascular:            No murmur heard.  Pulmonary/Chest: No respiratory distress.   Abdominal: Abdomen is soft.   Left CVA tenderness.  No right CVA tenderness.  No abdominal tenderness to palpation, no rebound or guarding.   Musculoskeletal:         General: No edema.      Cervical back: Normal range of motion.     Neurological: She is alert.   Skin: Skin is warm.   Psychiatric: She has a normal mood and affect.         ED Course   Procedures  Labs Reviewed   CBC W/ AUTO DIFFERENTIAL - Abnormal       Result Value    WBC 9.05      RBC 4.69      Hemoglobin 12.8      Hematocrit 38.6      MCV 82      MCH 27.3      MCHC 33.2      RDW 12.8      Platelets 354      MPV 9.0 (*)     Immature Granulocytes 0.2      Gran # (ANC) 3.4      Immature Grans (Abs) 0.02      Lymph # 3.0      Mono # 0.4      Eos # 2.2 (*)     Baso # 0.05      nRBC 0      Gran % 37.8 (*)     Lymph % 32.7      Mono % 4.4      Eosinophil % 24.3 (*)     Basophil % 0.6      Differential Method Automated     URINALYSIS, REFLEX TO URINE CULTURE - Abnormal    Specimen UA Urine,  Clean Catch      Color, UA Yellow      Appearance, UA Clear      pH, UA 6.0      Specific Gravity, UA 1.015      Protein, UA Negative      Glucose, UA Negative      Ketones, UA Negative      Bilirubin (UA) Negative      Occult Blood UA Trace (*)     Nitrite, UA Positive (*)     Urobilinogen, UA Negative      Leukocytes, UA 2+ (*)     Narrative:     Specimen Source->Urine   URINALYSIS MICROSCOPIC - Abnormal    RBC, UA 3      WBC, UA >100 (*)     WBC Clumps, UA Occasional (*)     Bacteria Many (*)     Squam Epithel, UA 2      Non-Squam Epith 3 (*)     Hyaline Casts, UA 1      Microscopic Comment SEE COMMENT      Narrative:     Specimen Source->Urine   CULTURE, URINE   COMPREHENSIVE METABOLIC PANEL    Sodium 141      Potassium 3.9      Chloride 105      CO2 25      Glucose 92      BUN 10      Creatinine 0.8      Calcium 10.2      Total Protein 7.6      Albumin 4.4      Total Bilirubin 0.8      Alkaline Phosphatase 114      AST 18      ALT 19      eGFR >60      Anion Gap 11     LIPASE    Lipase 48     LACTIC ACID, PLASMA    Lactate (Lactic Acid) 0.8            Imaging Results              CT Renal Stone Study ABD Pelvis WO (Final result)  Result time 09/27/24 11:57:55      Final result by Shandra Ruby MD (09/27/24 11:57:55)                   Impression:      Punctate calcification in the left lower pelvis measuring 3.4 mm, difficult to determine if this is in the pelvic soft tissues versus the adjacent decompressed urinary bladder.  No hydronephrosis or hydroureter is seen.    Mesenteric panniculitis.      Electronically signed by: Shandra Ruby MD  Date:    09/27/2024  Time:    11:57               Narrative:    EXAMINATION:  CT RENAL STONE STUDY ABD PELVIS WO    CLINICAL HISTORY:  Flank pain, kidney stone suspected;    TECHNIQUE:  Low dose axial images, sagittal and coronal reformations were obtained from the lung bases to the pubic symphysis.  Contrast was not  administered.    COMPARISON:  08/21/2019    FINDINGS:  The lung bases are clear.  The base of the heart appears normal.  Calcified coronary artery disease.  Calcified atheromatous disease of the aorta.    The gallbladder is decompressed.  No radiopaque gallstones.  No intrahepatic or extrahepatic biliary ductal dilatation is identified.  The unenhanced liver, spleen, pancreas and adrenal glands appear normal.  Both kidneys are normal in size, shape and contour.  No nephrolithiasis, ureterolithiasis, hydronephrosis or hydroureter.  The urinary bladder is not well distended and cannot be adequately evaluated.  Calcification in the left lower pelvis measuring 3.4 mm.  Is difficult to determine if this could possibly represent a calcification in the base of the urinary bladder or within the adjacent soft tissues.  The uterus has been removed.  Adnexal regions are unremarkable.    The bowel appears normal.  No free air, free fluid or obstruction.  No pathologically enlarged abdominal or pelvic lymph nodes are seen.  Mesenteric panniculitis.    Age-appropriate degenerative changes affect the skeleton.                                       Medications   sodium chloride 0.9% bolus 1,000 mL 1,000 mL (1,000 mLs Intravenous New Bag 9/27/24 1156)   ciprofloxacin (CIPRO)400mg/200ml D5W IVPB 400 mg (400 mg Intravenous New Bag 9/27/24 1208)   ketorolac injection 15 mg (15 mg Intravenous Given 9/27/24 1207)     Medical Decision Making  DDX: Possible kidney stone. R/o UTI/pyelonephritis, infected stone. Less likely appendicitis/diverticulitis given benign abdomen, but will be screened. Otherwise likely muscular strain.  DX:  CBC, CMP, lipase, lactate, UA. CTAP w/o contrast.   TX: Analgesia, antiemetic PRN. IVF. Treatment/consult as indicated by studies.  DISPO:  Pending workup        Amount and/or Complexity of Data Reviewed  Labs: ordered. Decision-making details documented in ED Course.  Radiology: ordered. Decision-making details  documented in ED Course.    Risk  Prescription drug management.               ED Course as of 09/27/24 1255   Fri Sep 27, 2024   1213 CT Renal Stone Study ABD Pelvis WO  Possible kidney stone, however not certain.  Patient has no sign of obstruction however, given patient is afebrile and well-appearing, with normal white count, will discharge home. [NB]   1229 Discussed CT scan with patient, she was aware of findings.  Will return if symptoms worsen despite treatment. [NB]   1253 Eos %(!): 24.3 [NB]   1253 Patient has elevation her eosinophil count.  Patient has no allergic symptoms currently.  Patient will follow up with the primary care physician regarding this finding, for repeat blood work. [NB]      ED Course User Index  [NB] Sedrick Ag MD                           Clinical Impression:  Final diagnoses:  [R10.9] Left flank pain  [N12] Pyelonephritis (Primary)          ED Disposition Condition    Discharge Stable          ED Prescriptions       Medication Sig Dispense Start Date End Date Auth. Provider    ciprofloxacin HCl (CIPRO) 500 MG tablet Take 1 tablet (500 mg total) by mouth 2 (two) times daily. for 10 days 20 tablet 9/27/2024 10/7/2024 Sedrick Ag MD    oxyCODONE-acetaminophen (PERCOCET) 5-325 mg per tablet Take 1 tablet by mouth every 6 (six) hours as needed. 3 tablet 9/27/2024 -- Sedrick Ag MD          Follow-up Information       Follow up With Specialties Details Why Contact Info    Demario Danielle MD Family Medicine Schedule an appointment as soon as possible for a visit in 2 days  [address] [place] [address] DR Elias TAN [phone]  [phone]      [facility] - Emergency Dept Emergency Medicine  If symptoms worsen [address] [place] [address] [id number] [phone]             Sedrick Ag MD  09/27/24 0661       Sedrick Ag MD  09/27/24 8364       Sedrick gA MD  09/27/24 6342

## 2024-09-27 NOTE — PROGRESS NOTES
Subjective:       Patient ID: Tracie Scott is a 54 y.o. female.    Chief Complaint: Back Pain (Patient is here for severe lower back pain) and Abdominal Pain (Patient is here for lower abdominal pains; thinks she is constipated.)    55 y/o  with abdominal pain with mixed fecal content of diarrhea and constipation; has been having  L flank/ back pain 10/10 starting > 2 weeks and now is terrible      Back Pain  Associated symptoms include abdominal pain.   Abdominal Pain  Associated symptoms include frequency.     Review of Systems   Gastrointestinal:  Positive for abdominal pain.   Genitourinary:  Positive for frequency and urgency.        Nocturia x 2 and urgency   Musculoskeletal:  Positive for back pain.       Objective:      Physical Exam  Constitutional:       Appearance: She is well-developed.   HENT:      Head: Normocephalic.   Eyes:      Pupils: Pupils are equal, round, and reactive to light.   Neck:      Thyroid: No thyromegaly.   Cardiovascular:      Rate and Rhythm: Normal rate and regular rhythm.   Pulmonary:      Effort: No respiratory distress.      Breath sounds: No wheezing or rales.   Chest:      Chest wall: No tenderness.   Abdominal:      General: There is no distension.      Tenderness: There is abdominal tenderness. There is no guarding or rebound.      Comments: LUQ  TTP 4+++ with guarding   Musculoskeletal:         General: No tenderness. Normal range of motion.      Cervical back: Normal range of motion and neck supple.   Lymphadenopathy:      Cervical: No cervical adenopathy.   Skin:     General: Skin is warm and dry.      Coloration: Skin is not pale.      Findings: No rash.   Neurological:      Mental Status: She is alert and oriented to person, place, and time.      Cranial Nerves: No cranial nerve deficit.      Motor: No abnormal muscle tone.      Coordination: Coordination normal.      Deep Tendon Reflexes: Reflexes are normal and symmetric. Reflexes normal.   Psychiatric:          Thought Content: Thought content normal.         Judgment: Judgment normal.         Assessment:       Encounter Diagnoses   Name Primary?    Nephritis Yes    Renal colic          Plan:   1. Nephritis    2. Renal colic     Needs CT abd/ U/S

## 2024-09-30 DIAGNOSIS — A49.8 E COLI INFECTION: Primary | ICD-10-CM

## 2024-09-30 LAB
BACTERIA UR CULT: ABNORMAL
BACTERIA UR CULT: ABNORMAL

## 2024-09-30 RX ORDER — SULFAMETHOXAZOLE AND TRIMETHOPRIM 800; 160 MG/1; MG/1
1 TABLET ORAL 2 TIMES DAILY
Qty: 14 TABLET | Refills: 0 | Status: SHIPPED | OUTPATIENT
Start: 2024-09-30

## 2024-09-30 NOTE — PROGRESS NOTES
The following lab results were abnormal. The following recommendations were made:I called and left a message of stopping the Cipor and to  Bactrim for her EQ Coli UTI and  we need a repeat U/A in one week

## 2024-10-08 ENCOUNTER — CLINICAL SUPPORT (OUTPATIENT)
Dept: FAMILY MEDICINE | Facility: CLINIC | Age: 54
End: 2024-10-08
Payer: COMMERCIAL

## 2024-10-08 DIAGNOSIS — A49.8 E COLI INFECTION: Primary | ICD-10-CM

## 2024-10-08 LAB
BACTERIA SPEC CULT: NORMAL /HPF
BILIRUB SERPL-MCNC: NORMAL MG/DL
BLOOD URINE, POC: NORMAL
CASTS: NORMAL
COLOR, POC UA: YELLOW
CRYSTALS: NORMAL
GLUCOSE UR QL STRIP: NORMAL
KETONES UR QL STRIP: NORMAL
LEUKOCYTE ESTERASE URINE, POC: NORMAL
NITRITE, POC UA: NORMAL
PH, POC UA: 7
PROTEIN, POC: NORMAL
RBC CELLS COUNTED: NORMAL
SPECIFIC GRAVITY, POC UA: 1.02
UROBILINOGEN, POC UA: 0.2
WHITE BLOOD CELLS: NORMAL

## 2024-10-08 PROCEDURE — 87086 URINE CULTURE/COLONY COUNT: CPT | Performed by: FAMILY MEDICINE

## 2024-10-09 LAB — BACTERIA UR CULT: NORMAL

## 2024-10-10 NOTE — PROGRESS NOTES
Please inform Herminia Hodges that she did not growth an urine infection- negative urine culture...doc arlene

## 2024-11-07 ENCOUNTER — LAB VISIT (OUTPATIENT)
Dept: LAB | Facility: HOSPITAL | Age: 54
End: 2024-11-07
Attending: FAMILY MEDICINE
Payer: COMMERCIAL

## 2024-11-07 ENCOUNTER — TELEPHONE (OUTPATIENT)
Dept: INTERNAL MEDICINE | Facility: CLINIC | Age: 54
End: 2024-11-07
Payer: COMMERCIAL

## 2024-11-07 DIAGNOSIS — R30.0 DYSURIA: ICD-10-CM

## 2024-11-07 DIAGNOSIS — R30.0 DYSURIA: Primary | ICD-10-CM

## 2024-11-07 LAB
BACTERIA #/AREA URNS HPF: ABNORMAL /HPF
BILIRUB UR QL STRIP: NEGATIVE
CLARITY UR: CLEAR
COLOR UR: YELLOW
GLUCOSE UR QL STRIP: NEGATIVE
HGB UR QL STRIP: NEGATIVE
HYALINE CASTS #/AREA URNS LPF: 1 /LPF
KETONES UR QL STRIP: NEGATIVE
LEUKOCYTE ESTERASE UR QL STRIP: ABNORMAL
MICROSCOPIC COMMENT: ABNORMAL
NITRITE UR QL STRIP: NEGATIVE
PH UR STRIP: 6 [PH] (ref 5–8)
PROT UR QL STRIP: NEGATIVE
RBC #/AREA URNS HPF: 2 /HPF (ref 0–4)
SP GR UR STRIP: >=1.03 (ref 1–1.03)
SQUAMOUS #/AREA URNS HPF: 3 /HPF
URN SPEC COLLECT METH UR: ABNORMAL
UROBILINOGEN UR STRIP-ACNC: NEGATIVE EU/DL
WBC #/AREA URNS HPF: 10 /HPF (ref 0–5)

## 2024-11-07 PROCEDURE — 81000 URINALYSIS NONAUTO W/SCOPE: CPT | Performed by: FAMILY MEDICINE

## 2024-11-07 NOTE — TELEPHONE ENCOUNTER
Pt is still c/o symptoms of UTI. Will repeat UA/reflex to culture to see if there is anything there and if not will send a message to Dr. Danielle to see how to proceed with continual symptoms. Verbalized understanding and will attempt to go to hospital today to complete the UA.

## 2024-11-07 NOTE — TELEPHONE ENCOUNTER
----- Message from Uriel sent at 2024 12:48 PM CST -----  Contact: pt  Tracie Scott  MRN: [id number]  : 1970  PCP: Demario Danielle  Home Phone      [phone]  Work Phone      Not on file.  Mobile          [phone]      MESSAGE:     Pt called wanting to speak with nurse about kidney infection not getting any better.        Please advise   [phone]

## 2024-11-08 NOTE — PROGRESS NOTES
The following lab U/A was much improved with a few WBCs--so push non carbonated 6-7-8 glasses of fluids/day  Ms Hodges

## 2024-11-11 ENCOUNTER — PATIENT MESSAGE (OUTPATIENT)
Dept: FAMILY MEDICINE | Facility: CLINIC | Age: 54
End: 2024-11-11
Payer: COMMERCIAL

## 2024-11-20 ENCOUNTER — TELEPHONE (OUTPATIENT)
Dept: OBSTETRICS AND GYNECOLOGY | Facility: CLINIC | Age: 54
End: 2024-11-20
Payer: COMMERCIAL

## 2024-11-20 NOTE — TELEPHONE ENCOUNTER
----- Message from Med Assistant Santos sent at 11/20/2024 11:20 AM CST -----  Contact: self  Tracie Scott  MRN: [id number]  Home Phone      [phone]  Work Phone      Not on file.  Mobile          [phone]    Patient Care Team:  Demario Danielle MD as PCP - General (Family Medicine)  Ilan Becker MD as Obstetrician (Obstetrics)  Romy Moran MD as Consulting Physician (Obstetrics and Gynecology)  Michelle Sanchez LPN as Care Coordinator  OB? No  What phone number can you be reached at? [phone]  Message:  Would like to speak to nurse regarding abd pain.

## 2024-11-21 ENCOUNTER — OFFICE VISIT (OUTPATIENT)
Dept: OBSTETRICS AND GYNECOLOGY | Facility: CLINIC | Age: 54
End: 2024-11-21
Payer: COMMERCIAL

## 2024-11-21 VITALS
SYSTOLIC BLOOD PRESSURE: 130 MMHG | HEART RATE: 90 BPM | HEIGHT: 67 IN | BODY MASS INDEX: 29.81 KG/M2 | WEIGHT: 189.94 LBS | DIASTOLIC BLOOD PRESSURE: 84 MMHG

## 2024-11-21 DIAGNOSIS — R10.32 LEFT LOWER QUADRANT PAIN: Primary | ICD-10-CM

## 2024-11-21 LAB
BILIRUB SERPL-MCNC: ABNORMAL MG/DL
BLOOD URINE, POC: ABNORMAL
CLARITY, POC UA: CLEAR
COLOR, POC UA: YELLOW
GLUCOSE UR QL STRIP: ABNORMAL
KETONES UR QL STRIP: ABNORMAL
LEUKOCYTE ESTERASE URINE, POC: ABNORMAL
NITRITE, POC UA: ABNORMAL
PH, POC UA: 6
PROTEIN, POC: ABNORMAL
SPECIFIC GRAVITY, POC UA: 1.01
UROBILINOGEN, POC UA: 0.2

## 2024-11-21 PROCEDURE — 99213 OFFICE O/P EST LOW 20 MIN: CPT | Mod: S$GLB,,, | Performed by: OBSTETRICS & GYNECOLOGY

## 2024-11-21 PROCEDURE — 1160F RVW MEDS BY RX/DR IN RCRD: CPT | Mod: CPTII,S$GLB,, | Performed by: OBSTETRICS & GYNECOLOGY

## 2024-11-21 PROCEDURE — 3075F SYST BP GE 130 - 139MM HG: CPT | Mod: CPTII,S$GLB,, | Performed by: OBSTETRICS & GYNECOLOGY

## 2024-11-21 PROCEDURE — 81002 URINALYSIS NONAUTO W/O SCOPE: CPT | Mod: S$GLB,,, | Performed by: OBSTETRICS & GYNECOLOGY

## 2024-11-21 PROCEDURE — 3079F DIAST BP 80-89 MM HG: CPT | Mod: CPTII,S$GLB,, | Performed by: OBSTETRICS & GYNECOLOGY

## 2024-11-21 PROCEDURE — 99999 PR PBB SHADOW E&M-EST. PATIENT-LVL III: CPT | Mod: PBBFAC,,, | Performed by: OBSTETRICS & GYNECOLOGY

## 2024-11-21 PROCEDURE — 3008F BODY MASS INDEX DOCD: CPT | Mod: CPTII,S$GLB,, | Performed by: OBSTETRICS & GYNECOLOGY

## 2024-11-21 PROCEDURE — 87086 URINE CULTURE/COLONY COUNT: CPT | Performed by: OBSTETRICS & GYNECOLOGY

## 2024-11-21 PROCEDURE — 1159F MED LIST DOCD IN RCRD: CPT | Mod: CPTII,S$GLB,, | Performed by: OBSTETRICS & GYNECOLOGY

## 2024-11-21 RX ORDER — NITROFURANTOIN 25; 75 MG/1; MG/1
100 CAPSULE ORAL 2 TIMES DAILY
Qty: 14 CAPSULE | Refills: 0 | Status: SHIPPED | OUTPATIENT
Start: 2024-11-21

## 2024-11-21 NOTE — PROGRESS NOTES
Subjective:       Patient ID: Tracie Scott is a 54 y.o. female.    Chief Complaint:  Back Pain (Pt here c/o lower back pain and pelvic pain on her L side, she states she had a kidney infection, she took antibiotics without relief)      History of Present Illness  Patient presents complaining left lower quadrant pain and back pain.  Patient states it little over a month ago she was in the hospital with a kidney infection and was treated with Bactrim.  She states her symptoms resolved completely.  Now she states her symptoms have been returning.  She denies any fever.    Menstrual History:  OB History          3    Para   3    Term                AB        Living   4         SAB        IAB        Ectopic        Multiple   1    Live Births                    Menarche age:  No LMP recorded (lmp unknown). Patient has had a hysterectomy.         Review of Systems  Review of Systems   Constitutional:  Negative for activity change, appetite change, chills, diaphoresis, fatigue, fever and unexpected weight change.   HENT:  Negative for congestion, dental problem, drooling, ear discharge, ear pain, facial swelling, hearing loss, mouth sores, nosebleeds, postnasal drip, rhinorrhea, sinus pressure, sneezing, sore throat, tinnitus, trouble swallowing and voice change.    Eyes:  Negative for photophobia, pain, discharge, redness, itching and visual disturbance.   Respiratory:  Positive for chest tightness. Negative for apnea, cough, choking, shortness of breath, wheezing and stridor.    Cardiovascular:  Positive for chest pain. Negative for palpitations and leg swelling.   Gastrointestinal:  Positive for abdominal pain. Negative for abdominal distention, anal bleeding, blood in stool, constipation, diarrhea, nausea, rectal pain and vomiting.   Endocrine: Negative for cold intolerance, heat intolerance, polydipsia, polyphagia and polyuria.   Genitourinary:  Positive for frequency. Negative for decreased urine  volume, difficulty urinating, dyspareunia, dysuria, enuresis, flank pain, genital sores, hematuria, menstrual problem, pelvic pain, urgency, vaginal bleeding, vaginal discharge and vaginal pain.   Musculoskeletal:  Positive for back pain. Negative for arthralgias, gait problem, joint swelling, myalgias, neck pain and neck stiffness.   Skin:  Negative for color change, pallor, rash and wound.   Allergic/Immunologic: Negative for environmental allergies, food allergies and immunocompromised state.   Neurological:  Positive for numbness. Negative for dizziness, tremors, seizures, syncope, facial asymmetry, speech difficulty, weakness, light-headedness and headaches.   Hematological:  Negative for adenopathy. Does not bruise/bleed easily.   Psychiatric/Behavioral:  Negative for agitation, behavioral problems, confusion, decreased concentration, dysphoric mood, hallucinations, self-injury, sleep disturbance and suicidal ideas. The patient is not nervous/anxious and is not hyperactive.          Objective:      Physical Exam  Vitals and nursing note reviewed. Exam conducted with a chaperone present.   Abdominal:       Genitourinary:     General: Normal vulva.         Assessment:        1. Left lower quadrant pain                Plan:         Tracie was seen today for back pain.    Diagnoses and all orders for this visit:    Left lower quadrant pain

## 2024-11-24 LAB
BACTERIA UR CULT: ABNORMAL

## 2024-11-26 ENCOUNTER — TELEPHONE (OUTPATIENT)
Dept: INTERNAL MEDICINE | Facility: CLINIC | Age: 54
End: 2024-11-26
Payer: COMMERCIAL

## 2024-11-26 NOTE — TELEPHONE ENCOUNTER
----- Message from Demario Danielle MD sent at 11/25/2024  5:49 PM CST -----  Please inform Ms Hodges that her urine did not grow a specific infection...shilpa jacobs

## 2024-11-26 NOTE — TELEPHONE ENCOUNTER
Called the pt and let her know that her urine culture didn't grow an infection.  Pt voiced understanding.

## 2025-02-28 ENCOUNTER — HOSPITAL ENCOUNTER (EMERGENCY)
Facility: HOSPITAL | Age: 55
Discharge: HOME | End: 2025-02-28
Attending: SURGERY
Payer: COMMERCIAL

## 2025-02-28 VITALS
SYSTOLIC BLOOD PRESSURE: 134 MMHG | TEMPERATURE: 98 F | RESPIRATION RATE: 17 BRPM | WEIGHT: 188.5 LBS | HEIGHT: 67 IN | BODY MASS INDEX: 29.58 KG/M2 | OXYGEN SATURATION: 100 % | HEART RATE: 62 BPM | DIASTOLIC BLOOD PRESSURE: 78 MMHG

## 2025-02-28 DIAGNOSIS — F41.9 ANXIETY: ICD-10-CM

## 2025-02-28 DIAGNOSIS — R07.89 ATYPICAL CHEST PAIN: Primary | ICD-10-CM

## 2025-02-28 LAB
ALBUMIN SERPL BCP-MCNC: 4.5 G/DL (ref 3.5–5.2)
ALP SERPL-CCNC: 100 U/L (ref 40–150)
ALT SERPL W/O P-5'-P-CCNC: 17 U/L (ref 10–44)
AMPHET+METHAMPHET UR QL: NEGATIVE
ANION GAP SERPL CALC-SCNC: 13 MMOL/L (ref 8–16)
AST SERPL-CCNC: 15 U/L (ref 10–40)
BARBITURATES UR QL SCN>200 NG/ML: NEGATIVE
BASOPHILS # BLD AUTO: 0.05 K/UL (ref 0–0.2)
BASOPHILS NFR BLD: 0.7 % (ref 0–1.9)
BENZODIAZ UR QL SCN>200 NG/ML: NEGATIVE
BILIRUB SERPL-MCNC: 1.2 MG/DL (ref 0.1–1)
BILIRUB UR QL STRIP: NEGATIVE
BNP SERPL-MCNC: 14 PG/ML (ref 0–99)
BUN SERPL-MCNC: 11 MG/DL (ref 6–20)
BZE UR QL SCN: NEGATIVE
CALCIUM SERPL-MCNC: 10.1 MG/DL (ref 8.7–10.5)
CANNABINOIDS UR QL SCN: NEGATIVE
CHLORIDE SERPL-SCNC: 103 MMOL/L (ref 95–110)
CLARITY UR: CLEAR
CO2 SERPL-SCNC: 25 MMOL/L (ref 23–29)
COLOR UR: YELLOW
CREAT SERPL-MCNC: 0.8 MG/DL (ref 0.5–1.4)
CREAT UR-MCNC: 49 MG/DL (ref 15–325)
D DIMER PPP IA.FEU-MCNC: 0.38 MG/L FEU
DIFFERENTIAL METHOD BLD: ABNORMAL
EOSINOPHIL # BLD AUTO: 0.3 K/UL (ref 0–0.5)
EOSINOPHIL NFR BLD: 4.8 % (ref 0–8)
ERYTHROCYTE [DISTWIDTH] IN BLOOD BY AUTOMATED COUNT: 12.3 % (ref 11.5–14.5)
EST. GFR  (NO RACE VARIABLE): >60 ML/MIN/1.73 M^2
GLUCOSE SERPL-MCNC: 103 MG/DL (ref 70–110)
GLUCOSE UR QL STRIP: NEGATIVE
HCT VFR BLD AUTO: 41.3 % (ref 37–48.5)
HGB BLD-MCNC: 13.6 G/DL (ref 12–16)
HGB UR QL STRIP: NEGATIVE
IMM GRANULOCYTES # BLD AUTO: 0.01 K/UL (ref 0–0.04)
IMM GRANULOCYTES NFR BLD AUTO: 0.1 % (ref 0–0.5)
KETONES UR QL STRIP: NEGATIVE
LEUKOCYTE ESTERASE UR QL STRIP: ABNORMAL
LYMPHOCYTES # BLD AUTO: 2.6 K/UL (ref 1–4.8)
LYMPHOCYTES NFR BLD: 37 % (ref 18–48)
MCH RBC QN AUTO: 27.3 PG (ref 27–31)
MCHC RBC AUTO-ENTMCNC: 32.9 G/DL (ref 32–36)
MCV RBC AUTO: 83 FL (ref 82–98)
METHADONE UR QL SCN>300 NG/ML: NEGATIVE
MICROSCOPIC COMMENT: NORMAL
MONOCYTES # BLD AUTO: 0.4 K/UL (ref 0.3–1)
MONOCYTES NFR BLD: 6.4 % (ref 4–15)
NEUTROPHILS # BLD AUTO: 3.5 K/UL (ref 1.8–7.7)
NEUTROPHILS NFR BLD: 51 % (ref 38–73)
NITRITE UR QL STRIP: NEGATIVE
NRBC BLD-RTO: 0 /100 WBC
OPIATES UR QL SCN: NEGATIVE
PCP UR QL SCN>25 NG/ML: NEGATIVE
PH UR STRIP: 8 [PH] (ref 5–8)
PLATELET # BLD AUTO: 346 K/UL (ref 150–450)
PMV BLD AUTO: 8.7 FL (ref 9.2–12.9)
POTASSIUM SERPL-SCNC: 4.2 MMOL/L (ref 3.5–5.1)
PROT SERPL-MCNC: 8 G/DL (ref 6–8.4)
PROT UR QL STRIP: NEGATIVE
RBC # BLD AUTO: 4.99 M/UL (ref 4–5.4)
SODIUM SERPL-SCNC: 141 MMOL/L (ref 136–145)
SP GR UR STRIP: 1.01 (ref 1–1.03)
TOXICOLOGY INFORMATION: NORMAL
TROPONIN I SERPL DL<=0.01 NG/ML-MCNC: <0.006 NG/ML (ref 0–0.03)
TROPONIN I SERPL DL<=0.01 NG/ML-MCNC: <0.006 NG/ML (ref 0–0.03)
URN SPEC COLLECT METH UR: ABNORMAL
UROBILINOGEN UR STRIP-ACNC: NEGATIVE EU/DL
WBC # BLD AUTO: 6.9 K/UL (ref 3.9–12.7)
WBC #/AREA URNS HPF: 0 /HPF (ref 0–5)

## 2025-02-28 PROCEDURE — 99285 EMERGENCY DEPT VISIT HI MDM: CPT | Mod: 25

## 2025-02-28 PROCEDURE — 80307 DRUG TEST PRSMV CHEM ANLYZR: CPT | Performed by: SURGERY

## 2025-02-28 PROCEDURE — 93005 ELECTROCARDIOGRAM TRACING: CPT

## 2025-02-28 PROCEDURE — 25000003 PHARM REV CODE 250: Performed by: SURGERY

## 2025-02-28 PROCEDURE — 85379 FIBRIN DEGRADATION QUANT: CPT | Performed by: SURGERY

## 2025-02-28 PROCEDURE — 84484 ASSAY OF TROPONIN QUANT: CPT | Performed by: SURGERY

## 2025-02-28 PROCEDURE — 83880 ASSAY OF NATRIURETIC PEPTIDE: CPT | Performed by: SURGERY

## 2025-02-28 PROCEDURE — 85025 COMPLETE CBC W/AUTO DIFF WBC: CPT | Performed by: SURGERY

## 2025-02-28 PROCEDURE — 80053 COMPREHEN METABOLIC PANEL: CPT | Performed by: SURGERY

## 2025-02-28 PROCEDURE — 81000 URINALYSIS NONAUTO W/SCOPE: CPT | Mod: 59 | Performed by: SURGERY

## 2025-02-28 PROCEDURE — 93010 ELECTROCARDIOGRAM REPORT: CPT | Mod: ,,, | Performed by: INTERNAL MEDICINE

## 2025-02-28 RX ORDER — ASPIRIN 325 MG
325 TABLET ORAL
Status: COMPLETED | OUTPATIENT
Start: 2025-02-28 | End: 2025-02-28

## 2025-02-28 RX ORDER — HYDROXYZINE PAMOATE 50 MG/1
50 CAPSULE ORAL EVERY 6 HOURS PRN
Qty: 20 CAPSULE | Refills: 0 | Status: SHIPPED | OUTPATIENT
Start: 2025-02-28

## 2025-02-28 RX ADMIN — ASPIRIN 325 MG ORAL TABLET 325 MG: 325 PILL ORAL at 09:02

## 2025-02-28 NOTE — ED PROVIDER NOTES
Encounter Date: 2/28/2025       History     Chief Complaint   Patient presents with    Chest Pain     Pt arrives to the ed with c/o chest pain last night and blood pressure and umesh heart rate ( 143/68). Pt reports headache and body aches.       History of Present Illness  Tracie Scott is a 54 y.o. female that presents with chest pain today  Patient states that she felt chest pain last night earlier, no CAD history noted  Patient has had longstanding issues with anxiety & atypical chest pain DX  Patient has never seen a cardiologist, normal sinus rhythm on EKG today  Patient feels that she had a panic attack prior to arrival, normal vitals now  Patient states that she is under lot of stress recently, stable blood pressure    The history is provided by the patient.     Review of patient's allergies indicates:   Allergen Reactions    Iodine Shortness Of Breath    Alcohol Other (See Comments)     Arms go numb with drinking alcohol    Pcn  [penicillins]      Other reaction(s): Unknown     Past Medical History:   Diagnosis Date    Breast cancer 05/2015     Past Surgical History:   Procedure Laterality Date    APPENDECTOMY      BREAST LUMPECTOMY      CYST REMOVAL      under left arm, back     DILATION AND CURETTAGE OF UTERUS N/A 2/12/2024    Procedure: DILATION AND CURETTAGE, UTERUS;  Surgeon: Ilan Becker MD;  Location: [facility] OR;  Service: OB/GYN;  Laterality: N/A;    LAPAROSCOPIC SALPINGO-OOPHORECTOMY Bilateral 5/13/2024    Procedure: SALPINGO-OOPHORECTOMY, LAPAROSCOPIC;  Surgeon: Ilan Becker MD;  Location: [facility] OR;  Service: OB/GYN;  Laterality: Bilateral;    LAPAROSCOPIC TOTAL HYSTERECTOMY N/A 5/13/2024    Procedure: HYSTERECTOMY, TOTAL, LAPAROSCOPIC;  Surgeon: Ilan Becker MD;  Location: [facility] OR;  Service: OB/GYN;  Laterality: N/A;    port removed  05/2019     Family History   Problem Relation Name Age of Onset    Heart disease Father      Cancer Maternal Grandfather          colon,  breast, liver    Breast cancer Maternal Grandmother  82    Colon cancer Maternal Grandmother      Cancer Maternal Grandmother          liver    Ovarian cancer Maternal Grandmother      Cervical cancer Maternal Aunt       Social History[1]  Review of Systems   Constitutional: Negative.    HENT: Negative.     Eyes: Negative.    Respiratory: Negative.     Cardiovascular:  Positive for chest pain.   Gastrointestinal: Negative.    Genitourinary:  Negative for dysuria, urgency and vaginal discharge.   Skin: Negative.    Neurological: Negative.    Psychiatric/Behavioral: Negative.     All other systems reviewed and are negative.      Physical Exam     Initial Vitals [02/28/25 0858]   BP Pulse Resp Temp SpO2   (!) 143/85 65 20 97.9 °F (36.6 °C) 99 %      MAP       --         Physical Exam    Nursing note and vitals reviewed.  Constitutional: She appears well-developed.   HENT:   Head: Normocephalic and atraumatic.   Right Ear: External ear normal.   Left Ear: External ear normal.   Nose: Nose normal. Mouth/Throat: Oropharynx is clear and moist.   Eyes: Conjunctivae and EOM are normal. Pupils are equal, round, and reactive to light.   Neck: Neck supple. No JVD present.   Normal range of motion.  Cardiovascular:  Normal rate and regular rhythm.           Pulmonary/Chest: No respiratory distress. She has no wheezes. She has no rhonchi. She has no rales. She exhibits no tenderness.   Abdominal: Abdomen is soft. Bowel sounds are normal. She exhibits no distension. There is no abdominal tenderness. There is no rebound.   Musculoskeletal:         General: Normal range of motion.      Cervical back: Normal range of motion and neck supple.     Neurological: She is alert and oriented to person, place, and time. She has normal strength and normal reflexes.   Skin: Skin is warm and dry.         ED Course   Procedures  Labs Reviewed   URINALYSIS, REFLEX TO URINE CULTURE - Abnormal       Result Value    Specimen UA Urine, Clean Catch       Color, UA Yellow      Appearance, UA Clear      pH, UA 8.0      Specific Gravity, UA 1.015      Protein, UA Negative      Glucose, UA Negative      Ketones, UA Negative      Bilirubin (UA) Negative      Occult Blood UA Negative      Nitrite, UA Negative      Urobilinogen, UA Negative      Leukocytes, UA Trace (*)     Narrative:     Specimen Source->Urine   COMPREHENSIVE METABOLIC PANEL - Abnormal    Sodium 141      Potassium 4.2      Chloride 103      CO2 25      Glucose 103      BUN 11      Creatinine 0.8      Calcium 10.1      Total Protein 8.0      Albumin 4.5      Total Bilirubin 1.2 (*)     Alkaline Phosphatase 100      AST 15      ALT 17      eGFR >60      Anion Gap 13     CBC W/ AUTO DIFFERENTIAL - Abnormal    WBC 6.90      RBC 4.99      Hemoglobin 13.6      Hematocrit 41.3      MCV 83      MCH 27.3      MCHC 32.9      RDW 12.3      Platelets 346      MPV 8.7 (*)     Immature Granulocytes 0.1      Gran # (ANC) 3.5      Immature Grans (Abs) 0.01      Lymph # 2.6      Mono # 0.4      Eos # 0.3      Baso # 0.05      nRBC 0      Gran % 51.0      Lymph % 37.0      Mono % 6.4      Eosinophil % 4.8      Basophil % 0.7      Differential Method Automated     DRUG SCREEN PANEL, URINE EMERGENCY    Benzodiazepines Negative      Methadone metabolites Negative      Cocaine (Metab.) Negative      Opiate Scrn, Ur Negative      Barbiturate Screen, Ur Negative      Amphetamine Screen, Ur Negative      THC Negative      Phencyclidine Negative      Creatinine, Urine 49.0      Toxicology Information SEE COMMENT      Narrative:     Specimen Source->Urine   TROPONIN I    Troponin I <0.006     B-TYPE NATRIURETIC PEPTIDE    BNP 14     D DIMER, QUANTITATIVE    D-Dimer 0.38     TROPONIN I    Troponin I <0.006     URINALYSIS MICROSCOPIC    WBC, UA 0      Microscopic Comment SEE COMMENT      Narrative:     Specimen Source->Urine     EKG Readings: (Independently Interpreted)   No STEMI  Normal sinus rhythm  No ectopy  Normal  conduction  Normal ST segments  Normal T-wave  Normal axis  Heart rate in the 60s       Imaging Results              X-Ray Chest 1 View (Final result)  Result time 02/28/25 09:14:28      Final result by Shandra Ruby MD (02/28/25 09:14:28)                   Impression:      No acute abnormality.      Electronically signed by: Shandra Ruby MD  Date:    02/28/2025  Time:    09:14               Narrative:    EXAMINATION:  XR CHEST 1 VIEW    CLINICAL HISTORY:  CP;    TECHNIQUE:  Single frontal view of the chest was performed.    COMPARISON:  05/10/2024    FINDINGS:  The lungs are clear with normal appearance of pulmonary vasculature. No pleural effusion. No evident pneumothorax.    The cardiac silhouette is normal in size. The hilar and mediastinal contours are unremarkable.    Bones are intact.                                       Medications   aspirin tablet 325 mg (325 mg Oral Given 2/28/25 0910)     Medical Decision Making  Differential includes angina, STEMI, non-STEMI, GERD, muscle pain, pleurisy    Problems Addressed:  Anxiety: complicated acute illness or injury  Atypical chest pain: complicated acute illness or injury    Amount and/or Complexity of Data Reviewed  Labs: ordered. Decision-making details documented in ED Course.  Radiology: ordered and independent interpretation performed.  ECG/medicine tests: ordered and independent interpretation performed.    ED Management & Risks of Complication, Morbidity, & Mortality:  Normal sinus rhythm on EKG, clear chest x-ray today  (-) troponins x2 with a (-) D-dimer in the emergency room tonight  Patient feeling much better in the ER, less anxious on reassess  [facility] cardiology consultation with Dr. Dougie Robles in the ER   Atypical CP diagnosis with outpatient follow-up recommended    Clinical Impression:  Final diagnoses:  [F41.9] Anxiety  [R07.89] Atypical chest pain (Primary)          ED Disposition Condition    Discharge Stable          ED Prescriptions        Medication Sig Dispense Start Date End Date Auth. Provider    hydrOXYzine pamoate (VISTARIL) 50 MG Cap Take 1 capsule (50 mg total) by mouth every 6 (six) hours as needed (Anxiety). 20 capsule 2/28/2025 -- Kobi Nguyen MD          Follow-up Information       Follow up With Specialties Details Why Contact Info    Dougie Robles MD Cardiology Go in 2 days  [address] [place] [id number] [phone]                 [1]   Social History  Tobacco Use    Smoking status: Never    Smokeless tobacco: Never   Substance Use Topics    Alcohol use: No    Drug use: No        Kobi Nguyen MD  02/28/25 [id number]

## 2025-02-28 NOTE — DISCHARGE INSTRUCTIONS
Patient will be prescribe an aspirin & day as well as Vistaril as needed for anxiety.  Patient was seen in the emergency room today on consultation by Dr. Dougie Robles with [facility] cardiology.  Call his office today at [phone] for the follow-up appointment.  Please return with ANY chest pain after discharge today.    Kobi Nguyen M.D. 1:18 PM 2/28/2025

## 2025-02-28 NOTE — CONSULTS
[facility] - Emergency Dept  Cardiology  Consult Note    Patient Name: Tracie Scott  MRN: [id number]  Admission Date: 2/28/2025  Hospital Length of Stay: 0 days  Code Status: Prior   Attending Provider: Kobi Nguyen MD   Consulting Provider: Macie Lopez NP  Primary Care Physician: Demario Danielle MD  Principal Problem:<principal problem not specified>    Patient information was obtained from patient and ER records.     Inpatient consult to [facility]  Consult performed by: Macie Lopez NP  Consult ordered by: Kobi Nguyen MD        Subjective:     Chief Complaint:  CP     HPI: 53 yo female with no prior cardiovascular evaluation, only risk factor is ASCVD in her father.   Presented to ER with c/o chest pain    Troponin negative x 1   EKG NSR, no ST segment shift     Past Medical History:   Diagnosis Date    Breast cancer 05/2015       Past Surgical History:   Procedure Laterality Date    APPENDECTOMY      BREAST LUMPECTOMY      CYST REMOVAL      under left arm, back     DILATION AND CURETTAGE OF UTERUS N/A 2/12/2024    Procedure: DILATION AND CURETTAGE, UTERUS;  Surgeon: Ilan Becker MD;  Location: [facility];  Service: OB/GYN;  Laterality: N/A;    LAPAROSCOPIC SALPINGO-OOPHORECTOMY Bilateral 5/13/2024    Procedure: SALPINGO-OOPHORECTOMY, LAPAROSCOPIC;  Surgeon: Ilan Becker MD;  Location: [facility] OR;  Service: OB/GYN;  Laterality: Bilateral;    LAPAROSCOPIC TOTAL HYSTERECTOMY N/A 5/13/2024    Procedure: HYSTERECTOMY, TOTAL, LAPAROSCOPIC;  Surgeon: Ilan Becker MD;  Location: [facility] OR;  Service: OB/GYN;  Laterality: N/A;    port removed  05/2019       Review of patient's allergies indicates:   Allergen Reactions    Iodine Shortness Of Breath    Alcohol Other (See Comments)     Arms go numb with drinking alcohol    Pcn  [penicillins]      Other reaction(s): Unknown       No current facility-administered medications on file prior to encounter.     Current Outpatient  Medications on File Prior to Encounter   Medication Sig    nitrofurantoin, macrocrystal-monohydrate, (MACROBID) 100 MG capsule Take 1 capsule (100 mg total) by mouth 2 (two) times daily.    oxyCODONE-acetaminophen (PERCOCET) 5-325 mg per tablet Take 1 tablet by mouth every 6 (six) hours as needed. (Patient not taking: Reported on 11/21/2024)     Family History       Problem Relation (Age of Onset)    Breast cancer Maternal Grandmother (82)    Cancer Maternal Grandfather, Maternal Grandmother    Cervical cancer Maternal Aunt    Colon cancer Maternal Grandmother    Heart disease Father    Ovarian cancer Maternal Grandmother          Tobacco Use    Smoking status: Never    Smokeless tobacco: Never   Substance and Sexual Activity    Alcohol use: No    Drug use: No    Sexual activity: Yes     Partners: Male     Birth control/protection: None, See Surgical Hx     Comment: .     Review of Systems   Cardiovascular:  Positive for chest pain.   All other systems reviewed and are negative.    Objective:     Vital Signs (Most Recent):  Temp: 97.9 °F (36.6 °C) (02/28/25 0858)  Pulse: 60 (02/28/25 1015)  Resp: 18 (02/28/25 1015)  BP: 134/63 (02/28/25 1015)  SpO2: 99 % (02/28/25 1015) Vital Signs (24h Range):  Temp:  [97.9 °F (36.6 °C)] 97.9 °F (36.6 °C)  Pulse:  [52-65] 60  Resp:  [14-20] 18  SpO2:  [98 %-100 %] 99 %  BP: (133-143)/(63-85) 134/63     Weight: 85.5 kg (188 lb 7.9 oz)  Body mass index is 29.52 kg/m².    SpO2: 99 %       No intake or output data in the 24 hours ending 02/28/25 1052    Lines/Drains/Airways       Peripheral Intravenous Line  Duration                  Peripheral IV - Single Lumen 02/28/25 0930 20 G Left Antecubital <1 day                    Physical Exam  Vitals and nursing note reviewed.   Constitutional:       Appearance: Normal appearance.   Cardiovascular:      Rate and Rhythm: Normal rate and regular rhythm.      Pulses: Normal pulses.      Heart sounds: Normal heart sounds.   Pulmonary:      " Effort: Pulmonary effort is normal.      Breath sounds: Normal breath sounds.   Abdominal:      General: Abdomen is flat.   Skin:     General: Skin is warm and dry.   Neurological:      General: No focal deficit present.      Mental Status: She is alert and oriented to person, place, and time.   Psychiatric:         Mood and Affect: Mood normal.         Significant Labs: BMP:   Recent Labs   Lab 02/28/25  0916         K 4.2      CO2 25   BUN 11   CREATININE 0.8   CALCIUM 10.1   , CMP   Recent Labs   Lab 02/28/25  0916      K 4.2      CO2 25      BUN 11   CREATININE 0.8   CALCIUM 10.1   PROT 8.0   ALBUMIN 4.5   BILITOT 1.2*   ALKPHOS 100   AST 15   ALT 17   ANIONGAP 13   , CBC   Recent Labs   Lab 02/28/25  0916   WBC 6.90   HGB 13.6   HCT 41.3      , and Troponin No results for input(s): "TROPONINIHS" in the last 48 hours.    Significant Imaging: Echocardiogram: Transthoracic echo (TTE) complete (Cupid Only): No results found for this or any previous visit.  Assessment and Plan:     Chest pain since yesterday,worse this morning,  most likely due to anxiety with work stress and panic    No evidence of ACS, dissection or PE  Stable and comfortable now  Few RF for CAD      Plan: repeat Troponin and if stable, will schedule for ETT as outpatient  PPI,asa,anxiolytic      There are no hospital problems to display for this patient.      VTE Risk Mitigation (From admission, onward)      None            Thank you for your consult. I will follow-up with patient. Please contact us if you have any additional questions.    Macie Lopez NP scribe for VERO Robles MD  Cardiology    [facility] - Emergency Dept        "

## 2025-03-05 LAB
OHS QRS DURATION: 84 MS
OHS QTC CALCULATION: 470 MS

## 2025-04-22 ENCOUNTER — TELEPHONE (OUTPATIENT)
Dept: OBSTETRICS AND GYNECOLOGY | Facility: CLINIC | Age: 55
End: 2025-04-22
Payer: COMMERCIAL

## 2025-04-22 NOTE — TELEPHONE ENCOUNTER
----- Message from Med Assistant Santos sent at 4/22/2025  3:26 PM CDT -----  Contact: self  Tracie Vaca: [id number] Phone      Not on file.Work Phone      Not on file.Mobile          [phone] Care Team:Demario Danielle MD as PCP - General (Family Medicine)Ilan Becker MD as Obstetrician (Obstetrics)Romy Moran MD as Consulting Physician (Obstetrics and Gynecology)Michelle Sanchez LPN as Care CoordinatorOB? NoWhat phone number can you be reached at? [phone]: Needs to make appt for prolapse.

## 2025-06-16 ENCOUNTER — TELEPHONE (OUTPATIENT)
Dept: OBSTETRICS AND GYNECOLOGY | Facility: CLINIC | Age: 55
End: 2025-06-16
Payer: COMMERCIAL

## 2025-06-16 NOTE — TELEPHONE ENCOUNTER
----- Message from Med Assistant Santos sent at 6/16/2025 10:23 AM CDT -----  Contact: self  Tracie Scott  MRN: [id number]  Home Phone      Not on file.  Work Phone      Not on file.  Mobile          [phone]    Patient Care Team:  Demario Danielle MD as PCP - General (Family Medicine)  Ilan Becker MD as Obstetrician (Obstetrics)  Romy Moran MD as Consulting Physician (Obstetrics and Gynecology)  Michelle Sanchez LPN as Care Coordinator  OB? No  What phone number can you be reached at? [phone]  Message: Needs to make appt for abd pain, stated hurts to walk or sit.

## 2025-06-17 ENCOUNTER — OFFICE VISIT (OUTPATIENT)
Dept: OBSTETRICS AND GYNECOLOGY | Facility: CLINIC | Age: 55
End: 2025-06-17
Payer: COMMERCIAL

## 2025-06-17 VITALS
DIASTOLIC BLOOD PRESSURE: 76 MMHG | HEART RATE: 81 BPM | WEIGHT: 189.63 LBS | BODY MASS INDEX: 29.76 KG/M2 | SYSTOLIC BLOOD PRESSURE: 122 MMHG | HEIGHT: 67 IN

## 2025-06-17 DIAGNOSIS — R10.2 PELVIC PAIN IN FEMALE: Primary | ICD-10-CM

## 2025-06-17 DIAGNOSIS — R39.15 URINARY URGENCY: ICD-10-CM

## 2025-06-17 DIAGNOSIS — N81.9 VAGINAL VAULT PROLAPSE: ICD-10-CM

## 2025-06-17 PROBLEM — N94.9 UTERINE TENDERNESS: Status: RESOLVED | Noted: 2024-05-13 | Resolved: 2025-06-17

## 2025-06-17 LAB
BILIRUB SERPL-MCNC: NEGATIVE MG/DL
BLOOD URINE, POC: NORMAL
CLARITY, POC UA: CLEAR
COLOR, POC UA: YELLOW
GLUCOSE UR QL STRIP: NEGATIVE
KETONES UR QL STRIP: NEGATIVE
LEUKOCYTE ESTERASE URINE, POC: NORMAL
NITRITE, POC UA: NEGATIVE
PH, POC UA: 6
PROTEIN, POC: NEGATIVE
SPECIFIC GRAVITY, POC UA: 1.02
UROBILINOGEN, POC UA: 0.2

## 2025-06-17 PROCEDURE — 3008F BODY MASS INDEX DOCD: CPT | Mod: CPTII,S$GLB,, | Performed by: OBSTETRICS & GYNECOLOGY

## 2025-06-17 PROCEDURE — 99999 PR PBB SHADOW E&M-EST. PATIENT-LVL III: CPT | Mod: PBBFAC,,, | Performed by: OBSTETRICS & GYNECOLOGY

## 2025-06-17 PROCEDURE — 3074F SYST BP LT 130 MM HG: CPT | Mod: CPTII,S$GLB,, | Performed by: OBSTETRICS & GYNECOLOGY

## 2025-06-17 PROCEDURE — 1159F MED LIST DOCD IN RCRD: CPT | Mod: CPTII,S$GLB,, | Performed by: OBSTETRICS & GYNECOLOGY

## 2025-06-17 PROCEDURE — 3078F DIAST BP <80 MM HG: CPT | Mod: CPTII,S$GLB,, | Performed by: OBSTETRICS & GYNECOLOGY

## 2025-06-17 PROCEDURE — 99213 OFFICE O/P EST LOW 20 MIN: CPT | Mod: S$GLB,,, | Performed by: OBSTETRICS & GYNECOLOGY

## 2025-06-17 PROCEDURE — 1160F RVW MEDS BY RX/DR IN RCRD: CPT | Mod: CPTII,S$GLB,, | Performed by: OBSTETRICS & GYNECOLOGY

## 2025-06-17 PROCEDURE — 81002 URINALYSIS NONAUTO W/O SCOPE: CPT | Mod: S$GLB,,, | Performed by: OBSTETRICS & GYNECOLOGY

## 2025-06-17 NOTE — PROGRESS NOTES
Subjective:       Patient ID: Tracie Scott is a 55 y.o. female.    Chief Complaint:  Abdominal Pain (Pt here c/o every time she sits she has bad pressure and sometimes she is not able to empty her bladder)      History of Present Illness  Patient presents complaining of pelvic pain which started yesterday.  Patient states she feels pain vaginally.  Patient denies any urinary frequency urgency or dysuria.  Patient states she can see something protruding out vaginally    Menstrual History:  OB History          3    Para   3    Term                AB        Living   4         SAB        IAB        Ectopic        Multiple   1    Live Births                    Menarche age:  No LMP recorded (lmp unknown). Patient has had a hysterectomy.         Review of Systems  Review of Systems   Constitutional:  Positive for appetite change and fatigue. Negative for activity change, chills, diaphoresis, fever and unexpected weight change.   HENT:  Negative for congestion, dental problem, drooling, ear discharge, ear pain, facial swelling, hearing loss, mouth sores, nosebleeds, postnasal drip, rhinorrhea, sinus pressure, sneezing, sore throat, tinnitus, trouble swallowing and voice change.    Eyes:  Negative for photophobia, pain, discharge, redness, itching and visual disturbance.   Respiratory:  Negative for apnea, cough, choking, chest tightness, shortness of breath, wheezing and stridor.    Cardiovascular:  Positive for chest pain. Negative for palpitations and leg swelling.   Gastrointestinal:  Positive for abdominal pain and constipation. Negative for abdominal distention, anal bleeding, blood in stool, diarrhea, nausea, rectal pain and vomiting.   Endocrine: Positive for polydipsia. Negative for cold intolerance, heat intolerance, polyphagia and polyuria.   Genitourinary:  Positive for difficulty urinating and dyspareunia. Negative for decreased urine volume, dysuria, enuresis, flank pain, frequency, genital  sores, hematuria, menstrual problem, pelvic pain, urgency, vaginal bleeding, vaginal discharge and vaginal pain.   Musculoskeletal:  Positive for back pain. Negative for arthralgias, gait problem, joint swelling, myalgias, neck pain and neck stiffness.   Skin:  Negative for color change, pallor, rash and wound.   Allergic/Immunologic: Negative for environmental allergies, food allergies and immunocompromised state.   Neurological:  Positive for numbness. Negative for dizziness, tremors, seizures, syncope, facial asymmetry, speech difficulty, weakness, light-headedness and headaches.   Hematological:  Negative for adenopathy. Does not bruise/bleed easily.   Psychiatric/Behavioral:  Negative for agitation, behavioral problems, confusion, decreased concentration, dysphoric mood, hallucinations, self-injury, sleep disturbance and suicidal ideas. The patient is not nervous/anxious and is not hyperactive.          Objective:      Physical Exam  Vitals and nursing note reviewed. Exam conducted with a chaperone present.   Genitourinary:     General: Normal vulva.      Vagina: Prolapsed vaginal walls present.      Uterus: Absent.       Adnexa: Right adnexa normal and left adnexa normal.         Assessment:        1. Pelvic pain in female    2. Vaginal vault prolapse                Plan:         Tracie was seen today for abdominal pain.    Diagnoses and all orders for this visit:    Pelvic pain in female  -     Ambulatory referral/consult to Urogynecology; Future    Vaginal vault prolapse  -     Ambulatory referral/consult to Urogynecology; Future

## 2025-07-15 ENCOUNTER — TELEPHONE (OUTPATIENT)
Dept: OBSTETRICS AND GYNECOLOGY | Facility: CLINIC | Age: 55
End: 2025-07-15
Payer: COMMERCIAL

## 2025-07-15 NOTE — TELEPHONE ENCOUNTER
Pt called stating that she has not received a phone call from Dr. Parks's office to get her scheduled for an appt, I informed the pt that I will reach out to them for her, pt v/u.

## 2025-07-15 NOTE — TELEPHONE ENCOUNTER
Pt agreed to being scheduled with Dr Charly Mckeon 11/4/25 at 9 am, appt added to wait list. Call ended.

## 2025-07-15 NOTE — TELEPHONE ENCOUNTER
----- Message from Evelyn sent at 7/15/2025 10:22 AM CDT -----  Contact: Self  Tracie Scott  MRN: [id number]  Home Phone      Not on file.  Work Phone      Not on file.  Mobile          [phone]    Patient Care Team:  Demario Danielle MD as PCP - General (Family Medicine)  Ilan Becker MD as Obstetrician (Obstetrics)  Romy Moran MD as Consulting Physician (Obstetrics and Gynecology)  Michelle Sanchez LPN as Care Coordinator  OB? No  What phone number can you be reached at? [phone]  Message: pt states she was waiting on a phone call from a specialist but never got a call

## 2025-08-27 ENCOUNTER — OFFICE VISIT (OUTPATIENT)
Dept: NEUROLOGY | Facility: CLINIC | Age: 55
End: 2025-08-27
Payer: COMMERCIAL

## 2025-08-27 ENCOUNTER — HOSPITAL ENCOUNTER (OUTPATIENT)
Dept: RADIOLOGY | Facility: HOSPITAL | Age: 55
Discharge: HOME | End: 2025-08-27
Attending: NURSE PRACTITIONER
Payer: COMMERCIAL

## 2025-08-27 VITALS
BODY MASS INDEX: 30.12 KG/M2 | HEIGHT: 67 IN | RESPIRATION RATE: 16 BRPM | SYSTOLIC BLOOD PRESSURE: 118 MMHG | HEART RATE: 60 BPM | OXYGEN SATURATION: 96 % | DIASTOLIC BLOOD PRESSURE: 76 MMHG | WEIGHT: 191.94 LBS

## 2025-08-27 DIAGNOSIS — R20.8 ALLODYNIA: ICD-10-CM

## 2025-08-27 DIAGNOSIS — R07.89 CHEST WALL PAIN: ICD-10-CM

## 2025-08-27 DIAGNOSIS — R07.82 INTERCOSTAL PAIN: ICD-10-CM

## 2025-08-27 DIAGNOSIS — M48.02 CERVICAL STENOSIS OF SPINE: ICD-10-CM

## 2025-08-27 DIAGNOSIS — M54.2 CHRONIC NECK PAIN: ICD-10-CM

## 2025-08-27 DIAGNOSIS — M65.30 TRIGGER FINGER OF RIGHT HAND, UNSPECIFIED FINGER: ICD-10-CM

## 2025-08-27 DIAGNOSIS — F41.0 PANIC DISORDER: ICD-10-CM

## 2025-08-27 DIAGNOSIS — G89.29 CHRONIC NECK PAIN: ICD-10-CM

## 2025-08-27 DIAGNOSIS — R20.0 RIGHT ARM NUMBNESS: ICD-10-CM

## 2025-08-27 DIAGNOSIS — G89.29 CHRONIC RIGHT SHOULDER PAIN: Primary | ICD-10-CM

## 2025-08-27 DIAGNOSIS — M62.838 MUSCLE SPASM: ICD-10-CM

## 2025-08-27 DIAGNOSIS — R29.898 RIGHT ARM WEAKNESS: ICD-10-CM

## 2025-08-27 DIAGNOSIS — M25.511 CHRONIC RIGHT SHOULDER PAIN: Primary | ICD-10-CM

## 2025-08-27 PROBLEM — R19.7 BLOODY DIARRHEA: Status: RESOLVED | Noted: 2023-01-24 | Resolved: 2025-08-27

## 2025-08-27 PROBLEM — R10.2 PELVIC PAIN IN FEMALE: Status: RESOLVED | Noted: 2024-05-13 | Resolved: 2025-08-27

## 2025-08-27 PROBLEM — N30.00 ACUTE CYSTITIS WITHOUT HEMATURIA: Status: RESOLVED | Noted: 2020-09-09 | Resolved: 2025-08-27

## 2025-08-27 PROBLEM — N05.9 NEPHRITIS: Status: RESOLVED | Noted: 2024-09-27 | Resolved: 2025-08-27

## 2025-08-27 PROBLEM — N23 RENAL COLIC: Status: RESOLVED | Noted: 2024-09-27 | Resolved: 2025-08-27

## 2025-08-27 PROBLEM — L03.113 CELLULITIS OF ARM, RIGHT: Status: RESOLVED | Noted: 2021-12-28 | Resolved: 2025-08-27

## 2025-08-27 PROCEDURE — 3074F SYST BP LT 130 MM HG: CPT | Mod: CPTII,S$GLB,, | Performed by: NURSE PRACTITIONER

## 2025-08-27 PROCEDURE — 99204 OFFICE O/P NEW MOD 45 MIN: CPT | Mod: S$GLB,,, | Performed by: NURSE PRACTITIONER

## 2025-08-27 PROCEDURE — 1159F MED LIST DOCD IN RCRD: CPT | Mod: CPTII,S$GLB,, | Performed by: NURSE PRACTITIONER

## 2025-08-27 PROCEDURE — 3008F BODY MASS INDEX DOCD: CPT | Mod: CPTII,S$GLB,, | Performed by: NURSE PRACTITIONER

## 2025-08-27 PROCEDURE — 73130 X-RAY EXAM OF HAND: CPT | Mod: TC,RT

## 2025-08-27 PROCEDURE — 99999 PR PBB SHADOW E&M-EST. PATIENT-LVL IV: CPT | Mod: PBBFAC,,, | Performed by: NURSE PRACTITIONER

## 2025-08-27 PROCEDURE — 73130 X-RAY EXAM OF HAND: CPT | Mod: 26,RT,, | Performed by: RADIOLOGY

## 2025-08-27 PROCEDURE — 3078F DIAST BP <80 MM HG: CPT | Mod: CPTII,S$GLB,, | Performed by: NURSE PRACTITIONER

## 2025-08-27 RX ORDER — TIZANIDINE 4 MG/1
4 TABLET ORAL NIGHTLY PRN
Qty: 30 TABLET | Refills: 5 | Status: SHIPPED | OUTPATIENT
Start: 2025-08-27 | End: 2025-09-06

## 2025-08-27 RX ORDER — ALPRAZOLAM 0.25 MG/1
0.25 TABLET ORAL DAILY PRN
Qty: 10 TABLET | Refills: 0 | Status: SHIPPED | OUTPATIENT
Start: 2025-08-27 | End: 2025-09-26

## 2025-08-27 RX ORDER — FLUOXETINE 10 MG/1
10 CAPSULE ORAL DAILY
Qty: 30 CAPSULE | Refills: 5 | Status: SHIPPED | OUTPATIENT
Start: 2025-08-27 | End: 2026-08-27

## (undated) DEVICE — SCISSOR 5MMX35CM DIRECT DRIVE

## (undated) DEVICE — VOYANT MARYLAND FUSION DEVICE

## (undated) DEVICE — IRRIGATOR ENDOSCOPY DISP.

## (undated) DEVICE — TUBING LAPARSCOPIC INSUFFLATIN

## (undated) DEVICE — GOWN POLY REINF BRTH SLV XL

## (undated) DEVICE — SCRUB HIBICLENS 4% CHG 4OZ

## (undated) DEVICE — ADHESIVE DERMABOND ADVANCED

## (undated) DEVICE — SUT CTD VICRYL 3-0 PS-1

## (undated) DEVICE — DRESSING TELFA N ADH 3X8

## (undated) DEVICE — ENDOSTITCH INSTRUMENT

## (undated) DEVICE — ELECTRODE LAP FLAT L-HOOK 36CM

## (undated) DEVICE — VACURETTE 12MM CURVED

## (undated) DEVICE — GLOVE PROTEXIS LTX MICRO  7.5

## (undated) DEVICE — TRAY DRY SKIN SCRUB PREP

## (undated) DEVICE — TROCAR KII FIOS ZTHREAD 11X100

## (undated) DEVICE — Device

## (undated) DEVICE — SET DISPOSABLE COLLECTION

## (undated) DEVICE — SUT VLOC 180 ABSORB ESTITCH

## (undated) DEVICE — NDL INSUF ULTRA VERESS 120MM

## (undated) DEVICE — TRAY URETHRAL CATH 14FR KC3410

## (undated) DEVICE — EVACUATOR KIT SMOKE PLUME AWAY

## (undated) DEVICE — TROCAR LAPSCP KII SZ 11 10CM

## (undated) DEVICE — SOL CLEARIFY VISUALIZATION LAP

## (undated) DEVICE — APPLICATOR CHLORAPREP ORN 26ML